# Patient Record
Sex: MALE | Race: WHITE | Employment: FULL TIME | URBAN - METROPOLITAN AREA
[De-identification: names, ages, dates, MRNs, and addresses within clinical notes are randomized per-mention and may not be internally consistent; named-entity substitution may affect disease eponyms.]

---

## 2018-01-20 PROBLEM — E11.8 TYPE 2 DIABETES MELLITUS WITH COMPLICATION, WITH LONG-TERM CURRENT USE OF INSULIN (HCC): Status: ACTIVE | Noted: 2018-01-20

## 2018-01-20 PROBLEM — I10 ESSENTIAL HYPERTENSION: Status: ACTIVE | Noted: 2018-01-20

## 2018-01-20 PROBLEM — E78.5 HYPERLIPEMIA: Status: ACTIVE | Noted: 2018-01-20

## 2018-01-20 PROBLEM — Z79.4 TYPE 2 DIABETES MELLITUS WITH COMPLICATION, WITH LONG-TERM CURRENT USE OF INSULIN (HCC): Status: ACTIVE | Noted: 2018-01-20

## 2018-01-20 PROBLEM — R07.9 CHEST PAIN: Status: ACTIVE | Noted: 2018-01-20

## 2018-01-21 PROBLEM — R07.9 CHEST PAIN: Status: RESOLVED | Noted: 2018-01-20 | Resolved: 2018-01-21

## 2018-01-21 PROBLEM — I44.7 LBBB (LEFT BUNDLE BRANCH BLOCK): Status: ACTIVE | Noted: 2018-01-21

## 2018-01-21 PROBLEM — I42.9 CARDIOMYOPATHY (HCC): Status: ACTIVE | Noted: 2018-01-21

## 2023-04-24 RX ORDER — DESVENLAFAXINE SUCCINATE 50 MG/1
50 TABLET, EXTENDED RELEASE ORAL DAILY
COMMUNITY

## 2023-04-24 RX ORDER — CLONAZEPAM 1 MG/1
TABLET ORAL
COMMUNITY
Start: 2017-12-15

## 2023-04-24 RX ORDER — ROSUVASTATIN CALCIUM 10 MG/1
TABLET, COATED ORAL
COMMUNITY
Start: 2017-10-09

## 2023-04-24 RX ORDER — LORAZEPAM 1 MG/1
1 TABLET ORAL EVERY 6 HOURS PRN
COMMUNITY

## 2023-04-24 RX ORDER — TRAZODONE HYDROCHLORIDE 50 MG/1
100 TABLET ORAL
COMMUNITY
Start: 2017-09-13

## 2023-04-24 RX ORDER — PIOGLITAZONEHYDROCHLORIDE 45 MG/1
45 TABLET ORAL
COMMUNITY
Start: 2018-01-10

## 2023-04-27 ENCOUNTER — ANESTHESIA EVENT (OUTPATIENT)
Dept: SURGERY | Age: 68
End: 2023-04-27
Payer: MEDICARE

## 2023-04-30 ENCOUNTER — PREP FOR PROCEDURE (OUTPATIENT)
Dept: OPHTHALMOLOGY | Age: 68
End: 2023-04-30

## 2023-04-30 RX ORDER — KETOROLAC TROMETHAMINE 5 MG/ML
1 SOLUTION OPHTHALMIC SEE ADMIN INSTRUCTIONS
Status: CANCELLED | OUTPATIENT
Start: 2023-04-30

## 2023-04-30 RX ORDER — SODIUM CHLORIDE 0.9 % (FLUSH) 0.9 %
5-40 SYRINGE (ML) INJECTION EVERY 12 HOURS SCHEDULED
Status: CANCELLED | OUTPATIENT
Start: 2023-04-30

## 2023-04-30 RX ORDER — SODIUM CHLORIDE 0.9 % (FLUSH) 0.9 %
5-40 SYRINGE (ML) INJECTION PRN
Status: CANCELLED | OUTPATIENT
Start: 2023-04-30

## 2023-04-30 RX ORDER — CYCLOPENTOLATE HYDROCHLORIDE 10 MG/ML
1 SOLUTION/ DROPS OPHTHALMIC PRN
Status: CANCELLED | OUTPATIENT
Start: 2023-04-30

## 2023-04-30 RX ORDER — TETRACAINE HYDROCHLORIDE 5 MG/ML
1 SOLUTION OPHTHALMIC SEE ADMIN INSTRUCTIONS
Status: CANCELLED | OUTPATIENT
Start: 2023-04-30

## 2023-04-30 RX ORDER — CIPROFLOXACIN HYDROCHLORIDE 3.5 MG/ML
1 SOLUTION/ DROPS TOPICAL SEE ADMIN INSTRUCTIONS
Status: CANCELLED | OUTPATIENT
Start: 2023-04-30

## 2023-04-30 RX ORDER — PHENYLEPHRINE HCL 2.5 %
1 DROPS OPHTHALMIC (EYE) SEE ADMIN INSTRUCTIONS
Status: CANCELLED | OUTPATIENT
Start: 2023-04-30

## 2023-04-30 RX ORDER — TROPICAMIDE 10 MG/ML
1 SOLUTION/ DROPS OPHTHALMIC SEE ADMIN INSTRUCTIONS
Status: CANCELLED | OUTPATIENT
Start: 2023-04-30

## 2023-04-30 RX ORDER — SODIUM CHLORIDE 9 MG/ML
INJECTION, SOLUTION INTRAVENOUS PRN
Status: CANCELLED | OUTPATIENT
Start: 2023-04-30

## 2023-05-01 ENCOUNTER — ANESTHESIA (OUTPATIENT)
Dept: SURGERY | Age: 68
End: 2023-05-01
Payer: MEDICARE

## 2023-05-01 ENCOUNTER — HOSPITAL ENCOUNTER (OUTPATIENT)
Age: 68
Setting detail: OUTPATIENT SURGERY
Discharge: HOME | End: 2023-05-01
Attending: STUDENT IN AN ORGANIZED HEALTH CARE EDUCATION/TRAINING PROGRAM | Admitting: STUDENT IN AN ORGANIZED HEALTH CARE EDUCATION/TRAINING PROGRAM
Payer: MEDICARE

## 2023-05-01 VITALS
WEIGHT: 250 LBS | HEART RATE: 87 BPM | HEIGHT: 73 IN | SYSTOLIC BLOOD PRESSURE: 162 MMHG | OXYGEN SATURATION: 94 % | DIASTOLIC BLOOD PRESSURE: 83 MMHG | RESPIRATION RATE: 18 BRPM | TEMPERATURE: 97.7 F | BODY MASS INDEX: 33.13 KG/M2

## 2023-05-01 LAB
GLUCOSE BLD-MCNC: 296 MG/DL (ref 70–99)
GLUCOSE BLD-MCNC: 307 MG/DL (ref 70–99)
PERFORMED ON: ABNORMAL
PERFORMED ON: ABNORMAL

## 2023-05-01 PROCEDURE — 6360000002 HC RX W HCPCS: Performed by: NURSE ANESTHETIST, CERTIFIED REGISTERED

## 2023-05-01 PROCEDURE — 2709999900 HC NON-CHARGEABLE SUPPLY: Performed by: STUDENT IN AN ORGANIZED HEALTH CARE EDUCATION/TRAINING PROGRAM

## 2023-05-01 PROCEDURE — 3600000004 HC SURGERY LEVEL 4 BASE: Performed by: STUDENT IN AN ORGANIZED HEALTH CARE EDUCATION/TRAINING PROGRAM

## 2023-05-01 PROCEDURE — 2500000003 HC RX 250 WO HCPCS: Performed by: STUDENT IN AN ORGANIZED HEALTH CARE EDUCATION/TRAINING PROGRAM

## 2023-05-01 PROCEDURE — 3600000014 HC SURGERY LEVEL 4 ADDTL 15MIN: Performed by: STUDENT IN AN ORGANIZED HEALTH CARE EDUCATION/TRAINING PROGRAM

## 2023-05-01 PROCEDURE — 7100000010 HC PHASE II RECOVERY - FIRST 15 MIN: Performed by: STUDENT IN AN ORGANIZED HEALTH CARE EDUCATION/TRAINING PROGRAM

## 2023-05-01 PROCEDURE — 6370000000 HC RX 637 (ALT 250 FOR IP): Performed by: STUDENT IN AN ORGANIZED HEALTH CARE EDUCATION/TRAINING PROGRAM

## 2023-05-01 PROCEDURE — 3700000001 HC ADD 15 MINUTES (ANESTHESIA): Performed by: STUDENT IN AN ORGANIZED HEALTH CARE EDUCATION/TRAINING PROGRAM

## 2023-05-01 PROCEDURE — 2580000003 HC RX 258: Performed by: NURSE ANESTHETIST, CERTIFIED REGISTERED

## 2023-05-01 PROCEDURE — 2580000003 HC RX 258: Performed by: ANESTHESIOLOGY

## 2023-05-01 PROCEDURE — 3700000000 HC ANESTHESIA ATTENDED CARE: Performed by: STUDENT IN AN ORGANIZED HEALTH CARE EDUCATION/TRAINING PROGRAM

## 2023-05-01 PROCEDURE — V2788 PRESBYOPIA-CORRECT FUNCTION: HCPCS | Performed by: STUDENT IN AN ORGANIZED HEALTH CARE EDUCATION/TRAINING PROGRAM

## 2023-05-01 DEVICE — IMPLANTABLE DEVICE: Type: IMPLANTABLE DEVICE | Site: EYE | Status: FUNCTIONAL

## 2023-05-01 RX ORDER — SODIUM CHLORIDE 0.9 % (FLUSH) 0.9 %
5-40 SYRINGE (ML) INJECTION PRN
Status: DISCONTINUED | OUTPATIENT
Start: 2023-05-01 | End: 2023-05-01 | Stop reason: HOSPADM

## 2023-05-01 RX ORDER — KETOROLAC TROMETHAMINE 5 MG/ML
1 SOLUTION OPHTHALMIC SEE ADMIN INSTRUCTIONS
Status: DISCONTINUED | OUTPATIENT
Start: 2023-05-01 | End: 2023-05-01 | Stop reason: HOSPADM

## 2023-05-01 RX ORDER — BALANCED SALT SOLUTION 6.4; .75; .48; .3; 3.9; 1.7 MG/ML; MG/ML; MG/ML; MG/ML; MG/ML; MG/ML
SOLUTION OPHTHALMIC
Status: COMPLETED | OUTPATIENT
Start: 2023-05-01 | End: 2023-05-01

## 2023-05-01 RX ORDER — TETRACAINE HYDROCHLORIDE 5 MG/ML
1 SOLUTION OPHTHALMIC SEE ADMIN INSTRUCTIONS
Status: DISCONTINUED | OUTPATIENT
Start: 2023-05-01 | End: 2023-05-01 | Stop reason: HOSPADM

## 2023-05-01 RX ORDER — CIPROFLOXACIN HYDROCHLORIDE 3.5 MG/ML
1 SOLUTION/ DROPS TOPICAL SEE ADMIN INSTRUCTIONS
Status: COMPLETED | OUTPATIENT
Start: 2023-05-01 | End: 2023-05-01

## 2023-05-01 RX ORDER — PHENYLEPHRINE HCL 2.5 %
1 DROPS OPHTHALMIC (EYE) SEE ADMIN INSTRUCTIONS
Status: DISCONTINUED | OUTPATIENT
Start: 2023-05-01 | End: 2023-05-01 | Stop reason: HOSPADM

## 2023-05-01 RX ORDER — SODIUM CHLORIDE 0.9 % (FLUSH) 0.9 %
5-40 SYRINGE (ML) INJECTION EVERY 12 HOURS SCHEDULED
Status: DISCONTINUED | OUTPATIENT
Start: 2023-05-01 | End: 2023-05-01 | Stop reason: HOSPADM

## 2023-05-01 RX ORDER — TETRACAINE HYDROCHLORIDE 5 MG/ML
SOLUTION OPHTHALMIC
Status: COMPLETED | OUTPATIENT
Start: 2023-05-01 | End: 2023-05-01

## 2023-05-01 RX ORDER — DEXTROSE MONOHYDRATE 100 MG/ML
INJECTION, SOLUTION INTRAVENOUS CONTINUOUS PRN
Status: DISCONTINUED | OUTPATIENT
Start: 2023-05-01 | End: 2023-05-01 | Stop reason: HOSPADM

## 2023-05-01 RX ORDER — SODIUM CHLORIDE 9 MG/ML
INJECTION, SOLUTION INTRAVENOUS PRN
Status: DISCONTINUED | OUTPATIENT
Start: 2023-05-01 | End: 2023-05-01 | Stop reason: HOSPADM

## 2023-05-01 RX ORDER — SODIUM CHLORIDE 9 MG/ML
INJECTION, SOLUTION INTRAVENOUS CONTINUOUS PRN
Status: DISCONTINUED | OUTPATIENT
Start: 2023-05-01 | End: 2023-05-01 | Stop reason: SDUPTHER

## 2023-05-01 RX ORDER — FENTANYL CITRATE 50 UG/ML
INJECTION, SOLUTION INTRAMUSCULAR; INTRAVENOUS PRN
Status: DISCONTINUED | OUTPATIENT
Start: 2023-05-01 | End: 2023-05-01 | Stop reason: SDUPTHER

## 2023-05-01 RX ORDER — OFLOXACIN 3 MG/ML
SOLUTION/ DROPS OPHTHALMIC
Status: COMPLETED | OUTPATIENT
Start: 2023-05-01 | End: 2023-05-01

## 2023-05-01 RX ORDER — INSULIN LISPRO 100 [IU]/ML
7 INJECTION, SOLUTION INTRAVENOUS; SUBCUTANEOUS
Status: DISCONTINUED | OUTPATIENT
Start: 2023-05-01 | End: 2023-05-01

## 2023-05-01 RX ORDER — PANTOPRAZOLE SODIUM 40 MG/1
40 TABLET, DELAYED RELEASE ORAL DAILY
COMMUNITY

## 2023-05-01 RX ORDER — BRIMONIDINE TARTRATE 2 MG/ML
SOLUTION/ DROPS OPHTHALMIC
Status: COMPLETED | OUTPATIENT
Start: 2023-05-01 | End: 2023-05-01

## 2023-05-01 RX ORDER — MIDAZOLAM HYDROCHLORIDE 1 MG/ML
INJECTION INTRAMUSCULAR; INTRAVENOUS PRN
Status: DISCONTINUED | OUTPATIENT
Start: 2023-05-01 | End: 2023-05-01 | Stop reason: SDUPTHER

## 2023-05-01 RX ORDER — TROPICAMIDE 10 MG/ML
1 SOLUTION/ DROPS OPHTHALMIC SEE ADMIN INSTRUCTIONS
Status: DISCONTINUED | OUTPATIENT
Start: 2023-05-01 | End: 2023-05-01 | Stop reason: HOSPADM

## 2023-05-01 RX ORDER — CYCLOPENTOLATE HYDROCHLORIDE 10 MG/ML
1 SOLUTION/ DROPS OPHTHALMIC PRN
Status: DISCONTINUED | OUTPATIENT
Start: 2023-05-01 | End: 2023-05-01 | Stop reason: HOSPADM

## 2023-05-01 RX ADMIN — INSULIN HUMAN 7 UNITS: 100 INJECTION, SOLUTION PARENTERAL at 11:00

## 2023-05-01 RX ADMIN — FENTANYL CITRATE 50 MCG: 50 INJECTION INTRAMUSCULAR; INTRAVENOUS at 11:18

## 2023-05-01 RX ADMIN — PHENYLEPHRINE HYDROCHLORIDE 1 DROP: 25 SOLUTION/ DROPS OPHTHALMIC at 10:20

## 2023-05-01 RX ADMIN — TROPICAMIDE 1 DROP: 10 SOLUTION/ DROPS OPHTHALMIC at 10:25

## 2023-05-01 RX ADMIN — TETRACAINE HYDROCHLORIDE 1 DROP: 5 SOLUTION OPHTHALMIC at 10:20

## 2023-05-01 RX ADMIN — KETOROLAC TROMETHAMINE 1 DROP: 0.5 SOLUTION OPHTHALMIC at 10:20

## 2023-05-01 RX ADMIN — FENTANYL CITRATE 50 MCG: 50 INJECTION INTRAMUSCULAR; INTRAVENOUS at 11:04

## 2023-05-01 RX ADMIN — CIPROFLOXACIN 1 DROP: 3 SOLUTION OPHTHALMIC at 10:20

## 2023-05-01 RX ADMIN — CYCLOPENTOLATE HYDROCHLORIDE 1 DROP: 10 SOLUTION/ DROPS OPHTHALMIC at 10:28

## 2023-05-01 RX ADMIN — PHENYLEPHRINE HYDROCHLORIDE 1 DROP: 25 SOLUTION/ DROPS OPHTHALMIC at 10:28

## 2023-05-01 RX ADMIN — TROPICAMIDE 1 DROP: 10 SOLUTION/ DROPS OPHTHALMIC at 10:20

## 2023-05-01 RX ADMIN — PHENYLEPHRINE HYDROCHLORIDE 1 DROP: 25 SOLUTION/ DROPS OPHTHALMIC at 10:25

## 2023-05-01 RX ADMIN — SODIUM CHLORIDE: 9 INJECTION, SOLUTION INTRAVENOUS at 10:29

## 2023-05-01 RX ADMIN — CYCLOPENTOLATE HYDROCHLORIDE 1 DROP: 10 SOLUTION/ DROPS OPHTHALMIC at 10:20

## 2023-05-01 RX ADMIN — MIDAZOLAM 1 MG: 1 INJECTION INTRAMUSCULAR; INTRAVENOUS at 11:19

## 2023-05-01 RX ADMIN — TROPICAMIDE 1 DROP: 10 SOLUTION/ DROPS OPHTHALMIC at 10:28

## 2023-05-01 RX ADMIN — CYCLOPENTOLATE HYDROCHLORIDE 1 DROP: 10 SOLUTION/ DROPS OPHTHALMIC at 10:25

## 2023-05-01 RX ADMIN — MIDAZOLAM 1 MG: 1 INJECTION INTRAMUSCULAR; INTRAVENOUS at 11:04

## 2023-05-01 RX ADMIN — SODIUM CHLORIDE: 9 INJECTION, SOLUTION INTRAVENOUS at 11:04

## 2023-05-01 ASSESSMENT — LIFESTYLE VARIABLES: SMOKING_STATUS: 0

## 2023-05-01 ASSESSMENT — PAIN SCALES - GENERAL
PAINLEVEL_OUTOF10: 0

## 2023-05-01 NOTE — ANESTHESIA POSTPROCEDURE EVALUATION
Department of Anesthesiology  Postprocedure Note    Patient: Meg Morris  MRN: [id number]  YOB: 1955  Date of evaluation: 5/1/2023      Procedure Summary     Date: 05/01/23 Room / Location: [address]    Anesthesia Start: 1104 Anesthesia Stop: 1129    Procedure: PHACOEMULSIFICATION WITH CLAREON PANOPTIX TORIC INTRAOCULAR LENS IMPLANT - RIGHT EYE (Right: Eye) Diagnosis:       Nuclear sclerotic cataract of right eye      Presbyopia      Astigmatism of right eye, unspecified type      (Nuclear sclerotic cataract of right eye, Presbyopia, Astigmatism of right eye)    Surgeons: James Mack MD Responsible Provider: Antoni Ibarra MD    Anesthesia Type: MAC ASA Status: 3          Anesthesia Type: MAC    Stephen Phase I: Stephen Score: 10    Stephen Phase II: Stephen Score: 10      Anesthesia Post Evaluation    Patient location during evaluation: PACU  Patient participation: complete - patient participated  Level of consciousness: awake  Airway patency: patent  Nausea & Vomiting: no nausea and no vomiting  Complications: no  Cardiovascular status: hemodynamically stable and blood pressure returned to baseline  Respiratory status: spontaneous ventilation, nonlabored ventilation and room air  Hydration status: stable  Comments: Mr. Alphonso Morales was seen resting comfortably following his procedure. Appropriate for discharge home with . Improved compliance with diabetes regimen strongly encouraged. No acute concerns.

## 2023-05-01 NOTE — ANESTHESIA PRE PROCEDURE
Department of Anesthesiology  Preprocedure Note       Name:  Juliano Bullard   Age:  69 y.o.  :  1955                                          MRN:  [id number]         Date:  2023      Surgeon: Ari Herrera):  Haylee Mckinney MD    Procedure: Procedure(s):  PHACOEMULSIFICATION WITH CLAREON PANOPTIX TORIC INTRAOCULAR LENS IMPLANT - RIGHT EYE    Medications prior to admission:   Prior to Admission medications    Medication Sig Start Date End Date Taking? Authorizing Provider   pantoprazole (PROTONIX) 40 MG tablet Take 1 tablet by mouth daily   Yes Historical Provider, MD   insulin lispro (HUMALOG) 100 UNIT/ML injection vial Inject 7 Units into the skin once   Yes Historical Provider, MD   pioglitazone (ACTOS) 45 MG tablet Take 1 tablet by mouth 1/10/18  Yes Historical Provider, MD   rosuvastatin (CRESTOR) 10 MG tablet TAKE ONE TABLET BY MOUTH DAILY 10/9/17  Yes Historical Provider, MD   traZODone (DESYREL) 50 MG tablet Take 3 tablets by mouth 17  Yes Historical Provider, MD   clonazePAM (KLONOPIN) 1 MG tablet TAKE ONE TABLET BY MOUTH ONCE DAILY AS NEEDED 12/15/17  Yes Historical Provider, MD   amLODIPine Besylate (NORVASC PO) Take by mouth   Yes Historical Provider, MD   desvenlafaxine succinate (PRISTIQ) 50 MG TB24 extended release tablet Take 1 tablet by mouth daily   Yes Historical Provider, MD   Propranolol HCl (INDERAL PO) Take by mouth   Yes Historical Provider, MD   LORazepam (ATIVAN) 1 MG tablet Take 1 tablet by mouth every 6 hours as needed for Anxiety.  Max Daily Amount: 4 mg   Yes Historical Provider, MD   atorvastatin (LIPITOR) 40 MG tablet Take 1 tablet by mouth nightly 18   CLARENCE Peter - NP   lisinopril (PRINIVIL;ZESTRIL) 20 MG tablet Take 1 tablet by mouth daily  Patient taking differently: Take 2 tablets by mouth daily 18   CLARENCE Peter NP   carvedilol (COREG) 25 MG tablet Take 1 tablet by mouth 2 times daily (with meals) 18   CLARENCE Peter NP   metFORMIN

## 2023-05-01 NOTE — DISCHARGE INSTRUCTIONS
Luiz Romero MD, NAGI    POST OPERATIVE INSTRUCTIONS FOR CATARACT SURGERY    Left / Right   Eye       Starting the DAY of surgery use all drops as directed. Please locate the name of the individual medication you were prescribed below and follow the instructions for its use. ANTIBIOTIC: (SOSA Cap): Your drop will be either Vigamox (moxifloxacin), Besivance or Polytrim. Instill 4 drops a day for 1 week. NSAID (GRAY Cap): Your drop will be BROMSITE, PROLENSA or KETOROLAC. Instill 1 drop daily for 4 weeks. Patients who receive KETOROLAC: Instill 1 drop 2 times a day for 2 weeks. STEROID (PINK Cap): Your steroid drop will be PREDNISOLONE ACETATE, INVELTYS OR LOTEMAX SM. Instill 4 drops a day for 1 week,   3 drops a day for 1 week,   2 drops a day for 1 week,  1 drop a day for 1 week. Please bring ALL of your eye drops with you to surgery and all follow-up appointments. Wait at least 3-5 minutes between eye drops. It's normal for some drops to briefly sting. POST OPERATIVE CATARACT DROP SCHEDULE    Week 1 Day 1 Day 2 Day 3 Day 4 Day 5 Day 6 Day 7   Marvelyn Megan or POLYTRIM  (Tan) ? ?  ? ? ? ?  ? ? ? ?  ? ? ? ?  ? ? ? ?  ? ? ? ?  ? ?   ? ?  ? ?     [place] Bae or KETOROLAC  Cheli Rumpf) ? ? ? ? ? ? ? Fowler Cydney or LOTEMAX SM  (Pink) ? ?  ? ? ? ?  ? ? ? ?  ? ? ? ?  ? ? ? ?  ? ? ? ?  ? ? ? ?  ? ? Week 2 Day 8 Day 9 Day 10 Day 11 Day 12 Day 13 Day 15   BROMSITE, PROLENSA or  KETOROLAC  Cheli Rumpf) ? ? ? ? ? ? ? Fowler Cydney or LOTEMAX SM   (Pink) ? ? ? ? ? ? ? ? ? ? ? ? ? ? ? ? ? ? ? ? ? Week 3 Day 15 Day 16 Day 17 Day 18 Day 19 Day 20 Day 21   [place] Bae or  Cheli Rumpf) ? ? ? ? ? ? ? Fowler Cydney or LOTEMAX SM   (Pink) ? ? ? ? ? ? ? ? ? ? ? ? ? ? Week 4 Day 22 Day 23 Day 24 Day 25 Day 26 Day 2 Day 29   Radha Bae or  Cheli Hernández) ? ? ? ? ? ? ?    Reji Lamas or LEXY EVANS

## 2023-05-01 NOTE — OP NOTE
Thor Boyle    OPERATIVE NOTE    Preoperative Diagnosis: Visually significant cataract right eye, Regular Astigmatism, right eye    Postoperative Diagnosis: Visually significant cataract right eye, Regular Astigmatism, right eye    Procedure: Phacoemulsification with intraocular lens implantation, right eye    Surgeon: Shanelle Love. John Romero MD, NAGI    Anesthesia: MAC, topical.    Complications: none    Estimated blood loss: less than 1 ml. Specimens: none    Indications for procedure: The patient is a 69y.o. year old who has experienced painless, progressive deterioration in vision which corresponds with increasing lenticular opacification of the right eye. This is contributing to an overall decline in visual functioning and interfering with activities of daily living as described in the medical record. After discussion of relevant indications, risks, benefits, alternatives, and limitations, the patient consented to proceed with cataract phacoemulsification with lens implantation for visual rehabilitation. Details of the procedure: Following informed consent, the patient was taken to the operating room and placed in the supine position. Monitored anesthesia care was administered. The patient's name, eye, intraocular lens model and power were verified (time-out). The patient was positioned under the operative microscope and the operative eye was prepped and draped in the usual sterile fashion using aseptic technique for cataract surgery. A wire lid speculum was placed. A peripheral paracentesis was made using a 1.1 mm blade. 2% preservative free lidocaine with dilute 1:1000 preservative free epinephrine (Epi-Shugarcaine) was injected through the side port incision for topical anesthesia. The anterior chamber was deepened with viscoelastic. A tri-planar temporal clear cornea incision was made with a 2.4 mm keratome.  A continuous curvilinear capsulorrhexis was initiated with a cystotome and completed using

## 2023-05-02 RX ORDER — DILTIAZEM HYDROCHLORIDE 120 MG/1
120 CAPSULE, EXTENDED RELEASE ORAL DAILY
COMMUNITY
Start: 2023-02-15

## 2023-05-12 ENCOUNTER — ANESTHESIA EVENT (OUTPATIENT)
Dept: SURGERY | Age: 68
End: 2023-05-12
Payer: MEDICARE

## 2023-05-14 RX ORDER — TROPICAMIDE 10 MG/ML
1 SOLUTION/ DROPS OPHTHALMIC SEE ADMIN INSTRUCTIONS
Status: CANCELLED | OUTPATIENT
Start: 2023-05-14

## 2023-05-14 RX ORDER — SODIUM CHLORIDE 9 MG/ML
INJECTION, SOLUTION INTRAVENOUS PRN
Status: CANCELLED | OUTPATIENT
Start: 2023-05-14

## 2023-05-14 RX ORDER — CYCLOPENTOLATE HYDROCHLORIDE 10 MG/ML
1 SOLUTION/ DROPS OPHTHALMIC PRN
Status: CANCELLED | OUTPATIENT
Start: 2023-05-14

## 2023-05-14 RX ORDER — TETRACAINE HYDROCHLORIDE 5 MG/ML
1 SOLUTION OPHTHALMIC SEE ADMIN INSTRUCTIONS
Status: CANCELLED | OUTPATIENT
Start: 2023-05-14

## 2023-05-14 RX ORDER — SODIUM CHLORIDE 0.9 % (FLUSH) 0.9 %
5-40 SYRINGE (ML) INJECTION EVERY 12 HOURS SCHEDULED
Status: CANCELLED | OUTPATIENT
Start: 2023-05-14

## 2023-05-14 RX ORDER — KETOROLAC TROMETHAMINE 5 MG/ML
1 SOLUTION OPHTHALMIC SEE ADMIN INSTRUCTIONS
Status: CANCELLED | OUTPATIENT
Start: 2023-05-14

## 2023-05-14 RX ORDER — SODIUM CHLORIDE 0.9 % (FLUSH) 0.9 %
5-40 SYRINGE (ML) INJECTION PRN
Status: CANCELLED | OUTPATIENT
Start: 2023-05-14

## 2023-05-14 RX ORDER — CIPROFLOXACIN HYDROCHLORIDE 3.5 MG/ML
1 SOLUTION/ DROPS TOPICAL SEE ADMIN INSTRUCTIONS
Status: CANCELLED | OUTPATIENT
Start: 2023-05-14

## 2023-05-14 RX ORDER — PHENYLEPHRINE HCL 2.5 %
1 DROPS OPHTHALMIC (EYE) SEE ADMIN INSTRUCTIONS
Status: CANCELLED | OUTPATIENT
Start: 2023-05-14

## 2023-05-15 ENCOUNTER — ANESTHESIA (OUTPATIENT)
Dept: SURGERY | Age: 68
End: 2023-05-15
Payer: MEDICARE

## 2023-05-15 ENCOUNTER — HOSPITAL ENCOUNTER (OUTPATIENT)
Age: 68
Setting detail: OUTPATIENT SURGERY
Discharge: HOME | End: 2023-05-15
Attending: STUDENT IN AN ORGANIZED HEALTH CARE EDUCATION/TRAINING PROGRAM | Admitting: STUDENT IN AN ORGANIZED HEALTH CARE EDUCATION/TRAINING PROGRAM
Payer: MEDICARE

## 2023-05-15 VITALS
WEIGHT: 250 LBS | SYSTOLIC BLOOD PRESSURE: 153 MMHG | BODY MASS INDEX: 33.13 KG/M2 | HEART RATE: 88 BPM | RESPIRATION RATE: 18 BRPM | OXYGEN SATURATION: 95 % | DIASTOLIC BLOOD PRESSURE: 80 MMHG | TEMPERATURE: 97.7 F | HEIGHT: 73 IN

## 2023-05-15 LAB
GLUCOSE BLD-MCNC: 212 MG/DL (ref 70–99)
GLUCOSE BLD-MCNC: 228 MG/DL (ref 70–99)
PERFORMED ON: ABNORMAL
PERFORMED ON: ABNORMAL

## 2023-05-15 PROCEDURE — 6360000002 HC RX W HCPCS: Performed by: NURSE ANESTHETIST, CERTIFIED REGISTERED

## 2023-05-15 PROCEDURE — 2500000003 HC RX 250 WO HCPCS: Performed by: STUDENT IN AN ORGANIZED HEALTH CARE EDUCATION/TRAINING PROGRAM

## 2023-05-15 PROCEDURE — 3600000004 HC SURGERY LEVEL 4 BASE: Performed by: STUDENT IN AN ORGANIZED HEALTH CARE EDUCATION/TRAINING PROGRAM

## 2023-05-15 PROCEDURE — 3700000000 HC ANESTHESIA ATTENDED CARE: Performed by: STUDENT IN AN ORGANIZED HEALTH CARE EDUCATION/TRAINING PROGRAM

## 2023-05-15 PROCEDURE — 2709999900 HC NON-CHARGEABLE SUPPLY: Performed by: STUDENT IN AN ORGANIZED HEALTH CARE EDUCATION/TRAINING PROGRAM

## 2023-05-15 PROCEDURE — V2788 PRESBYOPIA-CORRECT FUNCTION: HCPCS | Performed by: STUDENT IN AN ORGANIZED HEALTH CARE EDUCATION/TRAINING PROGRAM

## 2023-05-15 PROCEDURE — 2580000003 HC RX 258: Performed by: NURSE ANESTHETIST, CERTIFIED REGISTERED

## 2023-05-15 PROCEDURE — 3700000001 HC ADD 15 MINUTES (ANESTHESIA): Performed by: STUDENT IN AN ORGANIZED HEALTH CARE EDUCATION/TRAINING PROGRAM

## 2023-05-15 PROCEDURE — 2580000003 HC RX 258: Performed by: ANESTHESIOLOGY

## 2023-05-15 PROCEDURE — 6370000000 HC RX 637 (ALT 250 FOR IP): Performed by: STUDENT IN AN ORGANIZED HEALTH CARE EDUCATION/TRAINING PROGRAM

## 2023-05-15 PROCEDURE — 3600000014 HC SURGERY LEVEL 4 ADDTL 15MIN: Performed by: STUDENT IN AN ORGANIZED HEALTH CARE EDUCATION/TRAINING PROGRAM

## 2023-05-15 PROCEDURE — 7100000010 HC PHASE II RECOVERY - FIRST 15 MIN: Performed by: STUDENT IN AN ORGANIZED HEALTH CARE EDUCATION/TRAINING PROGRAM

## 2023-05-15 DEVICE — IMPLANTABLE DEVICE: Type: IMPLANTABLE DEVICE | Site: EYE | Status: FUNCTIONAL

## 2023-05-15 RX ORDER — SODIUM CHLORIDE 9 MG/ML
INJECTION, SOLUTION INTRAVENOUS PRN
Status: DISCONTINUED | OUTPATIENT
Start: 2023-05-15 | End: 2023-05-15 | Stop reason: SDUPTHER

## 2023-05-15 RX ORDER — CYCLOPENTOLATE HYDROCHLORIDE 10 MG/ML
1 SOLUTION/ DROPS OPHTHALMIC PRN
Status: DISCONTINUED | OUTPATIENT
Start: 2023-05-15 | End: 2023-05-15 | Stop reason: HOSPADM

## 2023-05-15 RX ORDER — BALANCED SALT SOLUTION 6.4; .75; .48; .3; 3.9; 1.7 MG/ML; MG/ML; MG/ML; MG/ML; MG/ML; MG/ML
SOLUTION OPHTHALMIC
Status: COMPLETED | OUTPATIENT
Start: 2023-05-15 | End: 2023-05-15

## 2023-05-15 RX ORDER — SODIUM CHLORIDE 9 MG/ML
INJECTION, SOLUTION INTRAVENOUS PRN
Status: DISCONTINUED | OUTPATIENT
Start: 2023-05-15 | End: 2023-05-15 | Stop reason: HOSPADM

## 2023-05-15 RX ORDER — SODIUM CHLORIDE 0.9 % (FLUSH) 0.9 %
5-40 SYRINGE (ML) INJECTION PRN
Status: DISCONTINUED | OUTPATIENT
Start: 2023-05-15 | End: 2023-05-15 | Stop reason: HOSPADM

## 2023-05-15 RX ORDER — TETRACAINE HYDROCHLORIDE 5 MG/ML
SOLUTION OPHTHALMIC
Status: COMPLETED | OUTPATIENT
Start: 2023-05-15 | End: 2023-05-15

## 2023-05-15 RX ORDER — BRIMONIDINE TARTRATE 2 MG/ML
SOLUTION/ DROPS OPHTHALMIC
Status: COMPLETED | OUTPATIENT
Start: 2023-05-15 | End: 2023-05-15

## 2023-05-15 RX ORDER — PREDNISOLONE ACETATE 10 MG/ML
SUSPENSION/ DROPS OPHTHALMIC
Status: COMPLETED | OUTPATIENT
Start: 2023-05-15 | End: 2023-05-15

## 2023-05-15 RX ORDER — FENTANYL CITRATE 50 UG/ML
INJECTION, SOLUTION INTRAMUSCULAR; INTRAVENOUS PRN
Status: DISCONTINUED | OUTPATIENT
Start: 2023-05-15 | End: 2023-05-15 | Stop reason: SDUPTHER

## 2023-05-15 RX ORDER — SODIUM CHLORIDE 0.9 % (FLUSH) 0.9 %
5-40 SYRINGE (ML) INJECTION EVERY 12 HOURS SCHEDULED
Status: DISCONTINUED | OUTPATIENT
Start: 2023-05-15 | End: 2023-05-15 | Stop reason: SDUPTHER

## 2023-05-15 RX ORDER — SODIUM CHLORIDE 0.9 % (FLUSH) 0.9 %
5-40 SYRINGE (ML) INJECTION PRN
Status: DISCONTINUED | OUTPATIENT
Start: 2023-05-15 | End: 2023-05-15 | Stop reason: SDUPTHER

## 2023-05-15 RX ORDER — SODIUM CHLORIDE 0.9 % (FLUSH) 0.9 %
5-40 SYRINGE (ML) INJECTION EVERY 12 HOURS SCHEDULED
Status: DISCONTINUED | OUTPATIENT
Start: 2023-05-15 | End: 2023-05-15 | Stop reason: HOSPADM

## 2023-05-15 RX ORDER — TETRACAINE HYDROCHLORIDE 5 MG/ML
1 SOLUTION OPHTHALMIC SEE ADMIN INSTRUCTIONS
Status: DISCONTINUED | OUTPATIENT
Start: 2023-05-15 | End: 2023-05-15 | Stop reason: HOSPADM

## 2023-05-15 RX ORDER — PHENYLEPHRINE HYDROCHLORIDE 100 MG/ML
1 SOLUTION/ DROPS OPHTHALMIC SEE ADMIN INSTRUCTIONS
Status: COMPLETED | OUTPATIENT
Start: 2023-05-15 | End: 2023-05-15

## 2023-05-15 RX ORDER — SODIUM CHLORIDE 9 MG/ML
INJECTION, SOLUTION INTRAVENOUS CONTINUOUS PRN
Status: DISCONTINUED | OUTPATIENT
Start: 2023-05-15 | End: 2023-05-15 | Stop reason: SDUPTHER

## 2023-05-15 RX ORDER — PHENYLEPHRINE HCL 2.5 %
1 DROPS OPHTHALMIC (EYE) SEE ADMIN INSTRUCTIONS
Status: DISCONTINUED | OUTPATIENT
Start: 2023-05-15 | End: 2023-05-15 | Stop reason: RX

## 2023-05-15 RX ORDER — KETOROLAC TROMETHAMINE 5 MG/ML
1 SOLUTION OPHTHALMIC SEE ADMIN INSTRUCTIONS
Status: DISCONTINUED | OUTPATIENT
Start: 2023-05-15 | End: 2023-05-15 | Stop reason: HOSPADM

## 2023-05-15 RX ORDER — MIDAZOLAM HYDROCHLORIDE 1 MG/ML
INJECTION INTRAMUSCULAR; INTRAVENOUS PRN
Status: DISCONTINUED | OUTPATIENT
Start: 2023-05-15 | End: 2023-05-15 | Stop reason: SDUPTHER

## 2023-05-15 RX ORDER — CIPROFLOXACIN HYDROCHLORIDE 3.5 MG/ML
1 SOLUTION/ DROPS TOPICAL SEE ADMIN INSTRUCTIONS
Status: COMPLETED | OUTPATIENT
Start: 2023-05-15 | End: 2023-05-15

## 2023-05-15 RX ORDER — OFLOXACIN 3 MG/ML
SOLUTION/ DROPS OPHTHALMIC
Status: COMPLETED | OUTPATIENT
Start: 2023-05-15 | End: 2023-05-15

## 2023-05-15 RX ORDER — TROPICAMIDE 10 MG/ML
1 SOLUTION/ DROPS OPHTHALMIC SEE ADMIN INSTRUCTIONS
Status: DISCONTINUED | OUTPATIENT
Start: 2023-05-15 | End: 2023-05-15 | Stop reason: HOSPADM

## 2023-05-15 RX ADMIN — KETOROLAC TROMETHAMINE 1 DROP: 0.5 SOLUTION OPHTHALMIC at 06:59

## 2023-05-15 RX ADMIN — TROPICAMIDE 1 DROP: 10 SOLUTION/ DROPS OPHTHALMIC at 07:09

## 2023-05-15 RX ADMIN — TROPICAMIDE 1 DROP: 10 SOLUTION/ DROPS OPHTHALMIC at 06:59

## 2023-05-15 RX ADMIN — PHENYLEPHRINE HYDROCHLORIDE 1 DROP: 100 SOLUTION/ DROPS OPHTHALMIC at 07:04

## 2023-05-15 RX ADMIN — CIPROFLOXACIN 1 DROP: 3 SOLUTION OPHTHALMIC at 06:59

## 2023-05-15 RX ADMIN — FENTANYL CITRATE 50 MCG: 50 INJECTION INTRAMUSCULAR; INTRAVENOUS at 07:38

## 2023-05-15 RX ADMIN — TROPICAMIDE 1 DROP: 10 SOLUTION/ DROPS OPHTHALMIC at 07:04

## 2023-05-15 RX ADMIN — TETRACAINE HYDROCHLORIDE 1 DROP: 5 SOLUTION OPHTHALMIC at 06:59

## 2023-05-15 RX ADMIN — CYCLOPENTOLATE HYDROCHLORIDE 1 DROP: 10 SOLUTION OPHTHALMIC at 07:04

## 2023-05-15 RX ADMIN — CYCLOPENTOLATE HYDROCHLORIDE 1 DROP: 10 SOLUTION OPHTHALMIC at 06:59

## 2023-05-15 RX ADMIN — PHENYLEPHRINE HYDROCHLORIDE 1 DROP: 100 SOLUTION/ DROPS OPHTHALMIC at 07:09

## 2023-05-15 RX ADMIN — MIDAZOLAM 1 MG: 1 INJECTION INTRAMUSCULAR; INTRAVENOUS at 07:31

## 2023-05-15 RX ADMIN — FENTANYL CITRATE 50 MCG: 50 INJECTION INTRAMUSCULAR; INTRAVENOUS at 07:31

## 2023-05-15 RX ADMIN — SODIUM CHLORIDE: 9 INJECTION, SOLUTION INTRAVENOUS at 07:07

## 2023-05-15 RX ADMIN — CYCLOPENTOLATE HYDROCHLORIDE 1 DROP: 10 SOLUTION OPHTHALMIC at 07:09

## 2023-05-15 RX ADMIN — SODIUM CHLORIDE: 9 INJECTION, SOLUTION INTRAVENOUS at 07:25

## 2023-05-15 RX ADMIN — MIDAZOLAM 1 MG: 1 INJECTION INTRAMUSCULAR; INTRAVENOUS at 07:26

## 2023-05-15 ASSESSMENT — PAIN SCALES - GENERAL: PAINLEVEL_OUTOF10: 0

## 2023-05-15 ASSESSMENT — PAIN - FUNCTIONAL ASSESSMENT: PAIN_FUNCTIONAL_ASSESSMENT: 0-10

## 2023-05-15 ASSESSMENT — LIFESTYLE VARIABLES: SMOKING_STATUS: 0

## 2023-05-15 NOTE — ANESTHESIA POSTPROCEDURE EVALUATION
Department of Anesthesiology  Postprocedure Note    Patient: Yulia Encarnacion  MRN: [id number]  YOB: 1955  Date of evaluation: 5/15/2023      Procedure Summary     Date: 05/15/23 Room / Location: [address]    Anesthesia Start: 8730 Anesthesia Stop: 0686    Procedure: PHACOEMULSIFICATION WITH CLAREON PANOPTIX TORIC INTRAOCULAR LENS IMPLANT - LEFT EYE (Left: Eye) Diagnosis:       Nuclear sclerotic cataract of left eye      Presbyopia      Astigmatism of left eye, unspecified type      (Nuclear sclerotic cataract of left eye, Presbyopia, Astigmatism of left eye)    Surgeons: Robert Camejo MD Responsible Provider: Jian Pinto MD    Anesthesia Type: MAC ASA Status: 3          Anesthesia Type: No value filed.     Stephen Phase I: Stephen Score: 10    Stephen Phase II: Stephen Score: 10      Anesthesia Post Evaluation    Patient location during evaluation: bedside  Patient participation: complete - patient participated  Level of consciousness: awake and alert  Pain score: 0  Airway patency: patent  Nausea & Vomiting: no vomiting  Complications: no  Cardiovascular status: blood pressure returned to baseline  Respiratory status: acceptable  Hydration status: euvolemic

## 2023-05-15 NOTE — ANESTHESIA PRE PROCEDURE
Department of Anesthesiology  Preprocedure Note       Name:  Alcides Herrera   Age:  69 y.o.  :  1955                                          MRN:  [id number]         Date:  5/15/2023      Surgeon: Brannon Bravo):  Sharon Parker MD    Procedure: Procedure(s):  PHACOEMULSIFICATION WITH CLAREON PANOPTIX TORIC INTRAOCULAR LENS IMPLANT - LEFT EYE    Medications prior to admission:   Prior to Admission medications    Medication Sig Start Date End Date Taking? Authorizing Provider   dilTIAZem B[facility]) 120 MG extended release capsule Take 1 capsule by mouth daily 2/15/23   Historical Provider, MD   pantoprazole (PROTONIX) 40 MG tablet Take 1 tablet by mouth daily    Historical Provider, MD   insulin lispro (HUMALOG) 100 UNIT/ML injection vial Inject 7 Units into the skin once    Historical Provider, MD   pioglitazone (ACTOS) 45 MG tablet Take 1 tablet by mouth 1/10/18   Historical Provider, MD   rosuvastatin (CRESTOR) 10 MG tablet TAKE ONE TABLET BY MOUTH DAILY 10/9/17   Historical Provider, MD   traZODone (DESYREL) 50 MG tablet Take 2 tablets by mouth 17   Historical Provider, MD   clonazePAM (KLONOPIN) 1 MG tablet TAKE ONE TABLET BY MOUTH ONCE DAILY AS NEEDED 12/15/17   Historical Provider, MD   amLODIPine Besylate (NORVASC PO) Take 5 mg by mouth daily    Historical Provider, MD   desvenlafaxine succinate (PRISTIQ) 50 MG TB24 extended release tablet Take 1 tablet by mouth daily    Historical Provider, MD   Propranolol HCl (INDERAL PO) Take 40 mg by mouth in the morning and at bedtime    Historical Provider, MD   LORazepam (ATIVAN) 1 MG tablet Take 1 tablet by mouth every 6 hours as needed for Anxiety.     Historical Provider, MD   lisinopril (PRINIVIL;ZESTRIL) 20 MG tablet Take 1 tablet by mouth daily  Patient taking differently: Take 2 tablets by mouth daily 18   CLARENCE Degroot NP   carvedilol (COREG) 25 MG tablet Take 1 tablet by mouth 2 times daily (with meals) 18   CLARENCE Degroot NP

## 2023-05-15 NOTE — DISCHARGE INSTRUCTIONS
Luiz Aguilar MD, NAGI    POST OPERATIVE INSTRUCTIONS FOR CATARACT SURGERY    Left / Right   Eye       Starting the DAY of surgery use all drops as directed. Please locate the name of the individual medication you were prescribed below and follow the instructions for its use. ANTIBIOTIC: (SOSA Cap): Your drop will be either Vigamox (moxifloxacin), Besivance or Polytrim. Instill 4 drops a day for 1 week. NSAID (GRAY Cap): Your drop will be BROMSITE, PROLENSA or KETOROLAC. Instill 1 drop daily for 4 weeks. Patients who receive KETOROLAC: Instill 1 drop 2 times a day for 2 weeks. STEROID (PINK Cap): Your steroid drop will be PREDNISOLONE ACETATE, INVELTYS OR LOTEMAX SM. Instill 4 drops a day for 1 week,   3 drops a day for 1 week,   2 drops a day for 1 week,  1 drop a day for 1 week. Please bring ALL of your eye drops with you to surgery and all follow-up appointments. Wait at least 3-5 minutes between eye drops. It's normal for some drops to briefly sting. POST OPERATIVE CATARACT DROP SCHEDULE    Week 1 Day 1 Day 2 Day 3 Day 4 Day 5 Day 6 Day 7   Brunetta Serge or POLYTRIM  (Tan) ? ?  ? ? ? ?  ? ? ? ?  ? ? ? ?  ? ? ? ?  ? ? ? ?  ? ?   ? ?  ? ?     Autumn Aristides or KETOROLAC  Bonny Spanner) ? ? ? ? ? ? ? Lacinda Mike or LOTEMAX SM  (Pink) ? ?  ? ? ? ?  ? ? ? ?  ? ? ? ?  ? ? ? ?  ? ? ? ?  ? ? ? ?  ? ? Week 2 Day 8 Day 9 Day 10 Day 11 Day 12 Day 13 Day 15   BROMSITE, PROLENSA or  KETOROLAC  Bonny Spanner) ? ? ? ? ? ? ? Lacinda Mike or LOTEMAX SM   (Pink) ? ? ? ? ? ? ? ? ? ? ? ? ? ? ? ? ? ? ? ? ? Week 3 Day 15 Day 16 Day 17 Day 18 Day 19 Day 20 Day 21   Autumn Aristides or  Bonny Spanner) ? ? ? ? ? ? ? Lacinda Mike or LOTEMAX SM   (Pink) ? ? ? ? ? ? ? ? ? ? ? ? ? ? Week 4 Day 22 Day 23 Day 24 Day 25 Day 26 Day 2 Day 29   Autumn Aristides or  Bonny Lynch) ? ? ? ? ? ? ?    Deana Mckeon or LEXY EVANS

## 2023-05-15 NOTE — OP NOTE
Darnell Fortis Boyle    OPERATIVE NOTE    Preoperative Diagnosis: Visually significant cataract left eye, Regular Astigmatism, left eye, Presbyopia, left eye    Postoperative Diagnosis: Visually significant cataract left eye, Regular Astigmatism, left eye, Presbyopia, left eye    Procedure: Phacoemulsification with intraocular lens implantation, left eye    Surgeon: Osvaldo Holman. Hina Byrnes MD, NAGI    Anesthesia: MAC, topical.    Complications: none    Estimated blood loss: less than 1 ml. Specimens: none    Indications for procedure: The patient is a 69y.o. year old who has experienced painless, progressive deterioration in vision which corresponds with increasing lenticular opacification of the left eye. This is contributing to an overall decline in visual functioning and interfering with activities of daily living as described in the medical record. After discussion of relevant indications, risks, benefits, alternatives, and limitations, the patient consented to proceed with cataract phacoemulsification with lens implantation for visual rehabilitation. Details of the procedure: Following informed consent, the patient was taken to the operating room and placed in the supine position. Monitored anesthesia care was administered. The patient's name, eye, intraocular lens model and power were verified (time-out). The patient was positioned under the operative microscope and the operative eye was prepped and draped in the usual sterile fashion using aseptic technique for cataract surgery. A wire lid speculum was placed. A peripheral paracentesis was made using a 1.1 mm blade. 2% preservative free lidocaine with dilute 1:1000 preservative free epinephrine (Epi-Shugarcaine) was injected through the side port incision for topical anesthesia. The anterior chamber was deepened with viscoelastic. A tri-planar temporal clear cornea incision was made with a 2.4 mm keratome.  A continuous curvilinear capsulorrhexis was initiated with

## 2024-03-01 ENCOUNTER — ANESTHESIA (OUTPATIENT)
Dept: ENDOSCOPY | Age: 69
End: 2024-03-01
Payer: MEDICARE

## 2024-03-01 ENCOUNTER — ANESTHESIA EVENT (OUTPATIENT)
Dept: ENDOSCOPY | Age: 69
End: 2024-03-01
Payer: MEDICARE

## 2024-03-01 ENCOUNTER — HOSPITAL ENCOUNTER (OUTPATIENT)
Age: 69
Setting detail: OUTPATIENT SURGERY
Discharge: HOME | End: 2024-03-01
Attending: INTERNAL MEDICINE | Admitting: INTERNAL MEDICINE
Payer: MEDICARE

## 2024-03-01 VITALS
OXYGEN SATURATION: 98 % | TEMPERATURE: 97.3 F | BODY MASS INDEX: 33.86 KG/M2 | HEART RATE: 87 BPM | SYSTOLIC BLOOD PRESSURE: 152 MMHG | HEIGHT: 72 IN | DIASTOLIC BLOOD PRESSURE: 82 MMHG | WEIGHT: 250 LBS | RESPIRATION RATE: 18 BRPM

## 2024-03-01 DIAGNOSIS — Z86.010 HISTORY OF COLON POLYPS: ICD-10-CM

## 2024-03-01 DIAGNOSIS — R13.10 DYSPHAGIA, UNSPECIFIED TYPE: ICD-10-CM

## 2024-03-01 LAB
GLUCOSE BLD-MCNC: 91 MG/DL (ref 70–99)
PERFORMED ON: NORMAL

## 2024-03-01 PROCEDURE — 3700000001 HC ADD 15 MINUTES (ANESTHESIA): Performed by: INTERNAL MEDICINE

## 2024-03-01 PROCEDURE — 3609010600 HC COLONOSCOPY POLYPECTOMY SNARE/COLD BIOPSY: Performed by: INTERNAL MEDICINE

## 2024-03-01 PROCEDURE — 7100000010 HC PHASE II RECOVERY - FIRST 15 MIN: Performed by: INTERNAL MEDICINE

## 2024-03-01 PROCEDURE — 6360000002 HC RX W HCPCS: Performed by: NURSE ANESTHETIST, CERTIFIED REGISTERED

## 2024-03-01 PROCEDURE — 2580000003 HC RX 258: Performed by: ANESTHESIOLOGY

## 2024-03-01 PROCEDURE — 7100000011 HC PHASE II RECOVERY - ADDTL 15 MIN: Performed by: INTERNAL MEDICINE

## 2024-03-01 PROCEDURE — 2709999900 HC NON-CHARGEABLE SUPPLY: Performed by: INTERNAL MEDICINE

## 2024-03-01 PROCEDURE — 3609017700 HC EGD DILATION GASTRIC/DUODENAL STRICTURE: Performed by: INTERNAL MEDICINE

## 2024-03-01 PROCEDURE — 3700000000 HC ANESTHESIA ATTENDED CARE: Performed by: INTERNAL MEDICINE

## 2024-03-01 PROCEDURE — 88305 TISSUE EXAM BY PATHOLOGIST: CPT

## 2024-03-01 RX ORDER — PROPOFOL 10 MG/ML
INJECTION, EMULSION INTRAVENOUS PRN
Status: DISCONTINUED | OUTPATIENT
Start: 2024-03-01 | End: 2024-03-01 | Stop reason: SDUPTHER

## 2024-03-01 RX ORDER — INSULIN DEGLUDEC 100 U/ML
70 INJECTION, SOLUTION SUBCUTANEOUS
COMMUNITY

## 2024-03-01 RX ORDER — LIDOCAINE HCL/PF 100 MG/5ML
SYRINGE (ML) INJECTION PRN
Status: DISCONTINUED | OUTPATIENT
Start: 2024-03-01 | End: 2024-03-01 | Stop reason: SDUPTHER

## 2024-03-01 RX ORDER — SODIUM CHLORIDE, SODIUM LACTATE, POTASSIUM CHLORIDE, CALCIUM CHLORIDE 600; 310; 30; 20 MG/100ML; MG/100ML; MG/100ML; MG/100ML
INJECTION, SOLUTION INTRAVENOUS CONTINUOUS
Status: DISCONTINUED | OUTPATIENT
Start: 2024-03-01 | End: 2024-03-01 | Stop reason: HOSPADM

## 2024-03-01 RX ORDER — LABETALOL HYDROCHLORIDE 5 MG/ML
INJECTION, SOLUTION INTRAVENOUS PRN
Status: DISCONTINUED | OUTPATIENT
Start: 2024-03-01 | End: 2024-03-01 | Stop reason: SDUPTHER

## 2024-03-01 RX ADMIN — LABETALOL HYDROCHLORIDE 10 MG: 5 INJECTION, SOLUTION INTRAVENOUS at 11:08

## 2024-03-01 RX ADMIN — PROPOFOL 200 MCG/KG/MIN: 10 INJECTION, EMULSION INTRAVENOUS at 11:01

## 2024-03-01 RX ADMIN — PROPOFOL 80 MG: 10 INJECTION, EMULSION INTRAVENOUS at 11:00

## 2024-03-01 RX ADMIN — Medication 80 MG: at 11:00

## 2024-03-01 RX ADMIN — LABETALOL HYDROCHLORIDE 5 MG: 5 INJECTION, SOLUTION INTRAVENOUS at 11:02

## 2024-03-01 RX ADMIN — SODIUM CHLORIDE, POTASSIUM CHLORIDE, SODIUM LACTATE AND CALCIUM CHLORIDE: 600; 310; 30; 20 INJECTION, SOLUTION INTRAVENOUS at 09:25

## 2024-03-01 RX ADMIN — PROPOFOL 40 MG: 10 INJECTION, EMULSION INTRAVENOUS at 11:02

## 2024-03-01 ASSESSMENT — PAIN - FUNCTIONAL ASSESSMENT
PAIN_FUNCTIONAL_ASSESSMENT: 0-10

## 2024-03-01 ASSESSMENT — LIFESTYLE VARIABLES: SMOKING_STATUS: 0

## 2024-03-01 NOTE — ANESTHESIA POSTPROCEDURE EVALUATION
Department of Anesthesiology  Postprocedure Note    Patient: Tony Boyle  MRN: [id number]  YOB: 1955  Date of evaluation: 3/1/2024    Procedure Summary       Date: 03/01/24 Room / Location: [place] [address] / [facility]    Anesthesia Start: 1055 Anesthesia Stop: 1130    Procedures:       ESOPHAGOGASTRODUODENOSCOPY DILATATION      COLONOSCOPY POLYPECTOMY SNARE/COLD BIOPSY Diagnosis:       Dysphagia, unspecified type      History of colon polyps      (Dysphagia, unspecified type [R13.10])      (History of colon polyps [Z86.010])    Surgeons: Ambrocio Coffey MD Responsible Provider: Dayton Olmos MD    Anesthesia Type: MAC ASA Status: 3            Anesthesia Type: No value filed.    Stephen Phase I: Stephen Score: 10    Stephen Phase II: Stephen Score: 10    Anesthesia Post Evaluation    Patient location during evaluation: bedside  Level of consciousness: awake and alert  Airway patency: patent  Nausea & Vomiting: no vomiting  Cardiovascular status: blood pressure returned to baseline  Respiratory status: acceptable  Hydration status: euvolemic  Multimodal analgesia pain management approach  Pain management: adequate    No notable events documented.

## 2024-03-01 NOTE — PROCEDURES
Colonoscopy Procedure  Note          Patient: Tony Boyle  : 1955  CRN:  @CRN@    Procedure: Colonoscopy with polypectomy (cold biopsy)    Date:  3/1/2024    Surgeon:  Ambrocio Coffey MD, MD    Referring Physician:  Hebert Bolivar MD    Preoperative Diagnosis:  Dysphagia, unspecified type [R13.10]  History of colon polyps [Z86.010]    Postoperative Diagnosis: 5 mm polyp removed from the sigmoid colon  Diverticulosis of the sigmoid  Internal hemorrhoids small    Anesthesia:  MAC    EBL: Minimal to none.    Indications: This is a 68 y.o. year old male who comes in today has had previous colon polyps were doing a colonoscopy    Procedure:   An informed consent was obtained from the patient after explanation of indications, benefits, possible risks and complications of the procedure.  The patient was then taken to the endoscopy suite, placed in the left lateral decubitus position, and the above IV anesthesia was administered.      Digital rectal examination was performed.  No masses good rectal      Rectum normal on 4 view retroflex view he does have small internal hemorrhoid    Sigmoid diverticulosis we also found a 5 mm polyp that removed with biopsy forceps    Descending normal but limited by prep    Transverse normal but limited by prep    Ascending normal but limited by prep    Cecum normal    TI not entered    Preparation was fair      The patient tolerated the procedure well and was taken to the PACU in good condition.  There were no immediate complications.      Impression: 1: Colon polyp  Diverticulosis  Small internal hemorrhoids    Recommendations: Patient's preparation was under 1 would have like today    We did find 1 colon polyp    We should have a follow-up colonoscopy in 3 years due to the limited preparation    Ambrocio Coffey MD, MD   [facility]  3/1/2024      Please note that some or all of this record was generated using voice recognition software. If there are any questions about

## 2024-03-01 NOTE — PROCEDURES
Endoscopy Note    Patient: Tony Boyle  : 1955  CSN: [id number]    Procedure: Esophagogastroduodenoscopy with esophageal dilation    Date:  3/1/2024     Surgeon:  Ambrocio Coffey MD     Referring Physician:  Hebert Bolivar MD    Preoperative Diagnosis:  Dysphagia, unspecified type [R13.10]  History of colon polyps [Z86.010]    Postoperative Diagnosis: #1 reflux esophagitis #2 severe gastroparesis    Anesthesia:  MAC    EBL: minimal to none.    Indications: This is a 68 y.o. year old male who presents today with been having some issues with difficulty swallowing he has had a previous esophageal motility study which was normal.    Description of Procedure:  Informed consent was obtained from the patient after explanation of indications, benefits and possible risks and complications of the procedure.  The patient was then taken to the endoscopy suite, placed in the left lateral decubitus position and the above IV sedation was administrered.    The Olympus video endoscope was passed through the hypopharynx   Posterior pharynx was normal    Esophagus he definitely had some evidence of a esophagitis  Hiatal hernia was 1 to 2 cm  Stomach did have an extensive amount of food in  The duodenum was normal  Could not retroflex  148 Alvarez dilator was placed recheck revealed no damage to the esophagus    Gastric or Duodenal ulcer present: No    The patient tolerated the procedure well and was taken to the post anesthesia care unit in good condition.  There were no immediate complications.      Impression: #1 reflux esophagitis #2 hiatal hernia #3 stomach did have food in it      Recommendations: The patient should be on a proton pump inhibitor  Also he has swelling of the stomach so he should be on some type of medication like either Reglan but that would interfere with some of his anxiety medications over the probably should put him on some Creon or Zenpep to help him break up his food faster    Ambrocio Coffey,  MD PETER  [facility]  [phone]    Please note that some or all of this record was generated using voice recognition software. If there are any questions about the content of this document, please contact the author as some errors in translation may have occurred.

## 2024-03-01 NOTE — PROGRESS NOTES
Ambulatory Surgery/Procedure Discharge Note    Vitals:    03/01/24 1150   BP: (!) 152/82   Pulse: 87   Resp: 18   Temp:    SpO2: 98%       In: 800 [I.V.:800]  Out: -     Restroom use offered before discharge.  Yes    Pain assessment:  none  Pain Level: 0    Patient arrived from endo procedure alert and oriented, respirations easy and non-labored, no distress noted, speech clear. Patient denies any pain or nausea. Pt denies offer for po fluids. Pt ambulated to restroom and voided x1. Dr. Coffey came to bedside to speak with patient and daughter. Education completed with patient and daughter, both verbalized understanding of instructions and follow-up.     Patient discharged to home/self care. Patient discharged via wheel chair by transporter to waiting family.       3/1/2024 11:54 AM

## 2024-03-01 NOTE — DISCHARGE INSTRUCTIONS
ENDOSCOPY DISCHARGE INSTRUCTIONS:    Call the physician that did your procedure for any questions or concern:    [facility]: [phone]  DR. DARLENE PEREIRA       ACTIVITY:    There are potential side effects to the medications used for sedation and anesthesia during your procedure.  These include:  Dizziness or light-headedness, confusion or memory loss, delayed reaction times, loss of coordination, nausea and vomiting.  Because of your increased risk for injury, we ask that you observe the following precautions:  For the next 24 hours,  DO NOT operate an automobile, bicycle, motorcycle, , power tools or large equipment of any kind.  Do not drink alcohol, sign any legal documents or make any legal decisions for 24 hours.  Do not bend your head over lower than your heart.  DO sit on the side of bed/couch awhile before getting up.  Plan on bedrest or quiet relaxation today.  You may resume normal activities in 24 hours.    DIET:    Your first meal today should be light, avoiding spicy and fatty foods.  If you tolerate this first meal, then you may advance to your regular diet unless otherwise advised by your physician.    NORMAL SYMPTOMS:  -Mild sore throat if you’ve had an EGD   -Gaseous discomfort    NOTIFY YOUR PHYSICIAN IF THESE SYMPTOMS OCCUR:  1. Fever (greater than 100)  5. Increased abdominal bloating  2. Severe pain    6. Excessive bleeding  3. Nausea and vomiting  7. Chest pain                                                                    4. Chills    8. Shortness of breath    ADDITIONAL INSTRUCTIONS:    Biopsy results: Call [facility] for biopsy results in 1 week    Educational Information:    Impression: #1 reflux esophagitis #2 hiatal hernia #3 stomach did have food in it        Recommendations: The patient should be on a proton pump inhibitor  Also he has swelling of the stomach so he should be on some type of medication like either Reglan but that would interfere with some of his  anxiety medications over the probably should put him on some Creon or Zenpep to help him break up his food faster     Ambrocio Coffey MD, MD  [facility]  [phone]      Please review these discharge instructions this evening or tomorrow for  information you may have forgotten.            We want to thank you for choosing the [facility] as your health care provider. We always strive to provide you with excellent care while you are here. You may receive a survey in the mail regarding your care. We would appreciate you taking a few minutes of your time to complete this survey.

## 2024-03-01 NOTE — H&P
Gastroenterology Note             Pre-operative History and Physical    Patient: oTny Boyle  : 1955  CSN:     History Obtained From:  patient and/or guardian.     HISTORY OF PRESENT ILLNESS:    The patient is a 68 y.o. male  here for this patient is here for EGD and a colonoscopy  Patient has had a problem with difficulty swallowing and also history of colon polyps surgery and both an upper and lower endoscopy    Past Medical History:    Past Medical History:   Diagnosis Date    Depression     Diabetes mellitus (HCC)     GERD (gastroesophageal reflux disease)     Hyperlipidemia     Hypertension     LBBB (left bundle branch block)     SVT (supraventricular tachycardia)      Past Surgical History:    Past Surgical History:   Procedure Laterality Date    COLONOSCOPY      ENDOSCOPY, COLON, DIAGNOSTIC      EYE SURGERY Right 2023    PHACOEMULSIFICATION WITH CLAREON PANOPTIX TORIC INTRAOCULAR LENS IMPLANT - RIGHT EYE performed by Luiz Mancuso MD at [facility]    EYE SURGERY Left 05/15/2023    PHACOEMULSIFICATION WITH CLAREON PANOPTIX TORIC INTRAOCULAR LENS IMPLANT - LEFT EYE performed by Luiz Mancuso MD at [facility]    INSERTABLE CARDIAC MONITOR      out    KNEE SURGERY Right     meninscus repair    PACEMAKER INSERTION      had 8 seconds heart stop    TONSILLECTOMY       Medications Prior to Admission:   No current facility-administered medications on file prior to encounter.     Current Outpatient Medications on File Prior to Encounter   Medication Sig Dispense Refill    Insulin Degludec (TRESIBA) 100 UNIT/ML SOLN Inject 70 Units into the skin      Semaglutide, 1 MG/DOSE, 2 MG/1.5ML SOPN Inject into the skin once a week      dilTIAZem (TIAZAC) 120 MG extended release capsule Take 1 capsule by mouth daily      pantoprazole (PROTONIX) 40 MG tablet Take 1 tablet by mouth daily      insulin lispro (HUMALOG) 100 UNIT/ML injection vial Inject 7 Units into the skin once      pioglitazone  expresses understanding.    Ambrocio Coffey MD,   [facility]  3/1/2024

## 2024-03-01 NOTE — ANESTHESIA PRE PROCEDURE
Department of Anesthesiology  Preprocedure Note       Name:  Tony Boyle   Age:  68 y.o.  :  1955                                          MRN:  [id number]         Date:  3/1/2024      Surgeon: Surgeon(s):  Ambrocio Coffey MD    Procedure: Procedure(s):  ESOPHAGOGASTRODUODENOSCOPY  COLONOSCOPY    Medications prior to admission:   Prior to Admission medications    Medication Sig Start Date End Date Taking? Authorizing Provider   Insulin Degludec (TRESIBA) 100 UNIT/ML SOLN Inject 70 Units into the skin   Yes Juana Hunt MD   Semaglutide, 1 MG/DOSE, 2 MG/1.5ML SOPN Inject into the skin once a week    Juana Hunt MD   dilTIAZem (TIAZAC) 120 MG extended release capsule Take 1 capsule by mouth daily 2/15/23   Juana Hunt MD   pantoprazole (PROTONIX) 40 MG tablet Take 1 tablet by mouth daily    Juana Hunt MD   insulin lispro (HUMALOG) 100 UNIT/ML injection vial Inject 7 Units into the skin once    Juana Hunt MD   pioglitazone (ACTOS) 45 MG tablet Take 1 tablet by mouth 1/10/18   Juana Hunt MD   rosuvastatin (CRESTOR) 10 MG tablet TAKE ONE TABLET BY MOUTH DAILY 10/9/17   Juana Hunt MD   traZODone (DESYREL) 50 MG tablet Take 2 tablets by mouth 17   Juana Hunt MD   clonazePAM (KLONOPIN) 1 MG tablet TAKE ONE TABLET BY MOUTH ONCE DAILY AS NEEDED 12/15/17   Juana Hunt MD   amLODIPine Besylate (NORVASC PO) Take 5 mg by mouth daily    Juana Hunt MD   desvenlafaxine succinate (PRISTIQ) 50 MG TB24 extended release tablet Take 1 tablet by mouth daily    Juana Hunt MD   Propranolol HCl (INDERAL PO) Take 40 mg by mouth in the morning and at bedtime    Juana Hunt MD   LORazepam (ATIVAN) 1 MG tablet Take 1 tablet by mouth every 6 hours as needed for Anxiety.    Juana Hunt MD   lisinopril (PRINIVIL;ZESTRIL) 20 MG tablet Take 1 tablet by mouth daily  Patient taking differently: Take 2

## 2024-10-07 ENCOUNTER — HOSPITAL ENCOUNTER (OUTPATIENT)
Dept: WOUND CARE | Age: 69
Discharge: HOME | End: 2024-10-07
Attending: NURSE PRACTITIONER
Payer: MEDICARE

## 2024-10-07 VITALS
TEMPERATURE: 97.8 F | BODY MASS INDEX: 35.7 KG/M2 | HEIGHT: 73 IN | RESPIRATION RATE: 18 BRPM | DIASTOLIC BLOOD PRESSURE: 69 MMHG | WEIGHT: 269.4 LBS | SYSTOLIC BLOOD PRESSURE: 141 MMHG | HEART RATE: 80 BPM

## 2024-10-07 DIAGNOSIS — E11.621 DIABETIC ULCER OF LEFT GREAT TOE (HCC): Primary | ICD-10-CM

## 2024-10-07 DIAGNOSIS — L97.529 DIABETIC ULCER OF LEFT GREAT TOE (HCC): Primary | ICD-10-CM

## 2024-10-07 PROCEDURE — 99203 OFFICE O/P NEW LOW 30 MIN: CPT | Performed by: NURSE PRACTITIONER

## 2024-10-07 PROCEDURE — 99203 OFFICE O/P NEW LOW 30 MIN: CPT

## 2024-10-07 RX ORDER — SODIUM CHLOR/HYPOCHLOROUS ACID 0.033 %
SOLUTION, IRRIGATION IRRIGATION ONCE
Status: CANCELLED | OUTPATIENT
Start: 2024-10-07 | End: 2024-10-07

## 2024-10-07 RX ORDER — GENTAMICIN SULFATE 1 MG/G
OINTMENT TOPICAL ONCE
Status: CANCELLED | OUTPATIENT
Start: 2024-10-07 | End: 2024-10-07

## 2024-10-07 RX ORDER — BACITRACIN ZINC AND POLYMYXIN B SULFATE 500; 1000 [USP'U]/G; [USP'U]/G
OINTMENT TOPICAL ONCE
OUTPATIENT
Start: 2024-10-07 | End: 2024-10-07

## 2024-10-07 RX ORDER — GINSENG 100 MG
CAPSULE ORAL ONCE
OUTPATIENT
Start: 2024-10-07 | End: 2024-10-07

## 2024-10-07 RX ORDER — DAPAGLIFLOZIN AND METFORMIN HYDROCHLORIDE 10; 500 MG/1; MG/1
1 TABLET, FILM COATED, EXTENDED RELEASE ORAL DAILY
Status: ON HOLD | COMMUNITY
Start: 2024-05-20

## 2024-10-07 RX ORDER — LIDOCAINE HYDROCHLORIDE 20 MG/ML
JELLY TOPICAL ONCE
OUTPATIENT
Start: 2024-10-07 | End: 2024-10-07

## 2024-10-07 RX ORDER — GENTAMICIN SULFATE 1 MG/G
OINTMENT TOPICAL ONCE
OUTPATIENT
Start: 2024-10-07 | End: 2024-10-07

## 2024-10-07 RX ORDER — BETAMETHASONE DIPROPIONATE 0.5 MG/G
CREAM TOPICAL ONCE
Status: CANCELLED | OUTPATIENT
Start: 2024-10-07 | End: 2024-10-07

## 2024-10-07 RX ORDER — TRIAMCINOLONE ACETONIDE 1 MG/G
OINTMENT TOPICAL ONCE
OUTPATIENT
Start: 2024-10-07 | End: 2024-10-07

## 2024-10-07 RX ORDER — BACITRACIN ZINC AND POLYMYXIN B SULFATE 500; 1000 [USP'U]/G; [USP'U]/G
OINTMENT TOPICAL ONCE
Status: CANCELLED | OUTPATIENT
Start: 2024-10-07 | End: 2024-10-07

## 2024-10-07 RX ORDER — LIDOCAINE HYDROCHLORIDE 20 MG/ML
JELLY TOPICAL ONCE
Status: CANCELLED | OUTPATIENT
Start: 2024-10-07 | End: 2024-10-07

## 2024-10-07 RX ORDER — LIDOCAINE 40 MG/G
CREAM TOPICAL ONCE
OUTPATIENT
Start: 2024-10-07 | End: 2024-10-07

## 2024-10-07 RX ORDER — LIDOCAINE 50 MG/G
OINTMENT TOPICAL ONCE
Status: CANCELLED | OUTPATIENT
Start: 2024-10-07 | End: 2024-10-07

## 2024-10-07 RX ORDER — LIDOCAINE HYDROCHLORIDE 40 MG/ML
SOLUTION TOPICAL ONCE
Status: CANCELLED | OUTPATIENT
Start: 2024-10-07 | End: 2024-10-07

## 2024-10-07 RX ORDER — GINSENG 100 MG
CAPSULE ORAL ONCE
Status: CANCELLED | OUTPATIENT
Start: 2024-10-07 | End: 2024-10-07

## 2024-10-07 RX ORDER — AMOXICILLIN AND CLAVULANATE POTASSIUM 500; 125 MG/1; MG/1
1 TABLET, FILM COATED ORAL 2 TIMES DAILY
COMMUNITY
Start: 2024-10-02 | End: 2024-10-10 | Stop reason: ALTCHOICE

## 2024-10-07 RX ORDER — SILVER SULFADIAZINE 10 MG/G
CREAM TOPICAL ONCE
OUTPATIENT
Start: 2024-10-07 | End: 2024-10-07

## 2024-10-07 RX ORDER — SODIUM CHLOR/HYPOCHLOROUS ACID 0.033 %
SOLUTION, IRRIGATION IRRIGATION ONCE
OUTPATIENT
Start: 2024-10-07 | End: 2024-10-07

## 2024-10-07 RX ORDER — MUPIROCIN 20 MG/G
OINTMENT TOPICAL ONCE
Status: CANCELLED | OUTPATIENT
Start: 2024-10-07 | End: 2024-10-07

## 2024-10-07 RX ORDER — NEOMYCIN/BACITRACIN/POLYMYXINB 3.5-400-5K
OINTMENT (GRAM) TOPICAL ONCE
Status: CANCELLED | OUTPATIENT
Start: 2024-10-07 | End: 2024-10-07

## 2024-10-07 RX ORDER — LIDOCAINE 40 MG/G
CREAM TOPICAL ONCE
Status: CANCELLED | OUTPATIENT
Start: 2024-10-07 | End: 2024-10-07

## 2024-10-07 RX ORDER — SILVER SULFADIAZINE 10 MG/G
CREAM TOPICAL ONCE
Status: CANCELLED | OUTPATIENT
Start: 2024-10-07 | End: 2024-10-07

## 2024-10-07 RX ORDER — MUPIROCIN 20 MG/G
OINTMENT TOPICAL ONCE
OUTPATIENT
Start: 2024-10-07 | End: 2024-10-07

## 2024-10-07 RX ORDER — CLOBETASOL PROPIONATE 0.5 MG/G
OINTMENT TOPICAL ONCE
Status: CANCELLED | OUTPATIENT
Start: 2024-10-07 | End: 2024-10-07

## 2024-10-07 RX ORDER — CLOBETASOL PROPIONATE 0.5 MG/G
OINTMENT TOPICAL ONCE
OUTPATIENT
Start: 2024-10-07 | End: 2024-10-07

## 2024-10-07 RX ORDER — LIDOCAINE 50 MG/G
OINTMENT TOPICAL ONCE
OUTPATIENT
Start: 2024-10-07 | End: 2024-10-07

## 2024-10-07 RX ORDER — NEOMYCIN/BACITRACIN/POLYMYXINB 3.5-400-5K
OINTMENT (GRAM) TOPICAL ONCE
OUTPATIENT
Start: 2024-10-07 | End: 2024-10-07

## 2024-10-07 RX ORDER — TRIAMCINOLONE ACETONIDE 1 MG/G
OINTMENT TOPICAL ONCE
Status: CANCELLED | OUTPATIENT
Start: 2024-10-07 | End: 2024-10-07

## 2024-10-07 RX ORDER — LIDOCAINE HYDROCHLORIDE 40 MG/ML
SOLUTION TOPICAL ONCE
Status: COMPLETED | OUTPATIENT
Start: 2024-10-07 | End: 2024-10-07

## 2024-10-07 RX ORDER — BETAMETHASONE DIPROPIONATE 0.5 MG/G
CREAM TOPICAL ONCE
OUTPATIENT
Start: 2024-10-07 | End: 2024-10-07

## 2024-10-07 RX ADMIN — LIDOCAINE HYDROCHLORIDE: 40 SOLUTION TOPICAL at 13:38

## 2024-10-07 ASSESSMENT — PAIN SCALES - GENERAL
PAINLEVEL_OUTOF10: 0
PAINLEVEL_OUTOF10: 0

## 2024-10-07 NOTE — PATIENT INSTRUCTIONS
[facility] Wound Care Physician Orders and Discharge Instructions  [address], [address]        [place], [place] [address]  Telephone: [phone]      FAX [phone]  MONDAY - THURSDAY 8:30 am - 4:30 pm and Friday 8:30 am - 11:30 am      NAME:  Tony Boyle  YOB: 1955  MEDICAL RECORD NUMBER:  [id number]  DATE:  10/7/2024      Return Appointment:  [x] Return Appointment: With Dr. Jimbo Bruno  in  1  Week(s)             [] Nurse Visit for Wound Assessment:  [] DME/Wound Dressing Supplies Provided by: Other n/a     Please call them directly to reorder supplies when you run out)  [] ECF or Home Healthcare:  Other: n/a  Your Home Care Agency is responsible to order your supplies.   [] Orders placed during your visit:         Important Reminders:   Please wash hands with soap and water before and after every dressing change.  If you smoke we ask that you refrain from smoking. Smoking inhibits wounds from healing.  When taking antibiotics take the entire prescription as ordered. Do not stop taking until medication is all gone unless otherwise instructed.   Do not get wounds wet in the bath or shower unless otherwise instructed by your physician. If your wound is on your foot or leg, you may purchase a cast bag. Please ask at your local pharmacy.   IF YOU ARE UNABLE TO OBTAIN WOUND SUPPLIES, CONTINUE TO USE THE SUPPLIES YOU HAVE AVAILABLE UNTIL YOU ARE ABLE TO REACH US. IT IS MOST IMPORTANT TO KEEP THE WOUND COVERED AT ALL TIMES.  ___________________________________________________________________    Wound Location: Left  Toe(s)  great toe     WOUND CLEANSING:Normal Saline    Della-Wound Topical Treatments:   Apply immediately around the wound: Apply BETADINE to skin surrounding the wound.    PRIMARY DRESSING: SANTYL, apply NICKEL thick , followed with , and SALINE MOISTENED GAUZE (squeeze out excess saline)       SECONDARY DRESSING: 2X2 gauze pad and Mark (Conforming roll)    N/a

## 2024-10-07 NOTE — PROGRESS NOTES
[facility]   Progress Note and Procedure Note      Tony Boyle  MEDICAL RECORD NUMBER:  [id number]  AGE: 68 y.o.   GENDER: male  : 1955  EPISODE DATE:  10/7/2024    Subjective:     Chief Complaint   Patient presents with    Wound Check     Initial visit for left great toe wound.  Patient states on 24 his toe had a blister and opened. He is on Augmentin          HISTORY of PRESENT ILLNESS HPI     Tony Boyle is a 68 y.o. male who presents today for wound/ulcer evaluation.   History of Wound Context: diabetic left great toe ulcer.  Patient of Dr. Richter's for the past 10 years.  He reports he developed a blister to the his left great toe \"several weeks ago\" per ED notes 24.  The blister split the tissue and callous of his toe.  He saw his podiatrist on 24 and she debrided it.  He had some redness at that time, but developed pain radiating up the foot into his ankle on 24 and was seen at the [facility] ED on 24.  They did x-rays, labs (random glucose 357), started a course of Keflex X7 days and applied Bacitracin Ointment and non-stick dressing.  The ER physician instructed him to FU with his PCP and Dr. Richter.  He presents today on Augmentin and has 2 separate layers of Coban on.  His left great toe has a thick black eschar that wasn't there when the Coban was applied.  Hx of DM; has pacemaker.    Wound/Ulcer Pain Timing/Severity: none  Quality of pain: N/A  Severity:  0 / 10   Modifying Factors: None  Associated Signs/Symptoms: drainage    Ulcer Identification:  Ulcer Type: diabetic    Contributing Factors:  Random blood glucose was elevated at 357; unable to find a recent A1C    Acute Wound: Other: started as a blister on 24    PAST MEDICAL HISTORY        Diagnosis Date    Depression     Diabetes mellitus (HCC)     GERD (gastroesophageal reflux disease)     Hyperlipidemia     Hypertension     LBBB (left bundle branch block)

## 2024-10-08 ENCOUNTER — HOSPITAL ENCOUNTER (OUTPATIENT)
Dept: WOUND CARE | Age: 69
Discharge: HOME | End: 2024-10-08
Attending: NURSE PRACTITIONER
Payer: MEDICARE

## 2024-10-08 VITALS
SYSTOLIC BLOOD PRESSURE: 161 MMHG | HEART RATE: 71 BPM | DIASTOLIC BLOOD PRESSURE: 84 MMHG | RESPIRATION RATE: 18 BRPM | TEMPERATURE: 97.9 F

## 2024-10-08 DIAGNOSIS — L97.529 DIABETIC ULCER OF LEFT GREAT TOE (HCC): Primary | ICD-10-CM

## 2024-10-08 DIAGNOSIS — E11.621 DIABETIC ULCER OF LEFT GREAT TOE (HCC): Primary | ICD-10-CM

## 2024-10-08 PROCEDURE — 11045 DBRDMT SUBQ TISS EACH ADDL: CPT

## 2024-10-08 PROCEDURE — 87186 SC STD MICRODIL/AGAR DIL: CPT

## 2024-10-08 PROCEDURE — 11042 DBRDMT SUBQ TIS 1ST 20SQCM/<: CPT

## 2024-10-08 PROCEDURE — 87077 CULTURE AEROBIC IDENTIFY: CPT

## 2024-10-08 PROCEDURE — 87070 CULTURE OTHR SPECIMN AEROBIC: CPT

## 2024-10-08 PROCEDURE — 87184 SC STD DISK METHOD PER PLATE: CPT

## 2024-10-08 PROCEDURE — 0JBR0ZZ EXCISION OF LEFT FOOT SUBCUTANEOUS TISSUE AND FASCIA, OPEN APPROACH: ICD-10-PCS | Performed by: PODIATRIST

## 2024-10-08 PROCEDURE — 87205 SMEAR GRAM STAIN: CPT

## 2024-10-08 RX ORDER — LIDOCAINE 40 MG/G
CREAM TOPICAL ONCE
OUTPATIENT
Start: 2024-10-08 | End: 2024-10-08

## 2024-10-08 RX ORDER — BETAMETHASONE DIPROPIONATE 0.5 MG/G
CREAM TOPICAL ONCE
OUTPATIENT
Start: 2024-10-08 | End: 2024-10-08

## 2024-10-08 RX ORDER — GINSENG 100 MG
CAPSULE ORAL ONCE
OUTPATIENT
Start: 2024-10-08 | End: 2024-10-08

## 2024-10-08 RX ORDER — SODIUM CHLOR/HYPOCHLOROUS ACID 0.033 %
SOLUTION, IRRIGATION IRRIGATION ONCE
OUTPATIENT
Start: 2024-10-08 | End: 2024-10-08

## 2024-10-08 RX ORDER — BACITRACIN ZINC AND POLYMYXIN B SULFATE 500; 1000 [USP'U]/G; [USP'U]/G
OINTMENT TOPICAL ONCE
OUTPATIENT
Start: 2024-10-08 | End: 2024-10-08

## 2024-10-08 RX ORDER — LIDOCAINE HYDROCHLORIDE 20 MG/ML
JELLY TOPICAL ONCE
OUTPATIENT
Start: 2024-10-08 | End: 2024-10-08

## 2024-10-08 RX ORDER — LIDOCAINE HYDROCHLORIDE 40 MG/ML
SOLUTION TOPICAL ONCE
Status: COMPLETED | OUTPATIENT
Start: 2024-10-08 | End: 2024-10-08

## 2024-10-08 RX ORDER — LIDOCAINE 50 MG/G
OINTMENT TOPICAL ONCE
OUTPATIENT
Start: 2024-10-08 | End: 2024-10-08

## 2024-10-08 RX ORDER — GENTAMICIN SULFATE 1 MG/G
OINTMENT TOPICAL ONCE
OUTPATIENT
Start: 2024-10-08 | End: 2024-10-08

## 2024-10-08 RX ORDER — NEOMYCIN/BACITRACIN/POLYMYXINB 3.5-400-5K
OINTMENT (GRAM) TOPICAL ONCE
OUTPATIENT
Start: 2024-10-08 | End: 2024-10-08

## 2024-10-08 RX ORDER — NITROGLYCERIN 0.1MG/HR
1 PATCH, TRANSDERMAL 24 HOURS TRANSDERMAL DAILY
Qty: 30 PATCH | Refills: 3 | Status: ON HOLD | OUTPATIENT
Start: 2024-10-08

## 2024-10-08 RX ORDER — CLOBETASOL PROPIONATE 0.5 MG/G
OINTMENT TOPICAL ONCE
OUTPATIENT
Start: 2024-10-08 | End: 2024-10-08

## 2024-10-08 RX ORDER — LIDOCAINE HYDROCHLORIDE 40 MG/ML
SOLUTION TOPICAL ONCE
OUTPATIENT
Start: 2024-10-08 | End: 2024-10-08

## 2024-10-08 RX ORDER — TRIAMCINOLONE ACETONIDE 1 MG/G
OINTMENT TOPICAL ONCE
OUTPATIENT
Start: 2024-10-08 | End: 2024-10-08

## 2024-10-08 RX ORDER — SILVER SULFADIAZINE 10 MG/G
CREAM TOPICAL ONCE
OUTPATIENT
Start: 2024-10-08 | End: 2024-10-08

## 2024-10-08 RX ORDER — MUPIROCIN 20 MG/G
OINTMENT TOPICAL ONCE
OUTPATIENT
Start: 2024-10-08 | End: 2024-10-08

## 2024-10-08 RX ADMIN — LIDOCAINE HYDROCHLORIDE 2.5 ML: 40 SOLUTION TOPICAL at 14:15

## 2024-10-08 ASSESSMENT — PAIN SCALES - GENERAL: PAINLEVEL_OUTOF10: 0

## 2024-10-08 NOTE — PROGRESS NOTES
TIMES.  ___________________________________________________________________    Wound Location: Left  Toe(s)  great toe     WOUND CLEANSING:Normal Saline    Della-Wound Topical Treatments:   Apply immediately around the wound: Normal Saline    PRIMARY DRESSING: Medihoney until SANTYL, apply NICKEL thick , followed with , and SALINE MOISTENED GAUZE (squeeze out excess saline)     Nitro Patch placed beside wound- Change site often    SECONDARY DRESSING: 2X2 gauze pad and Mark (Conforming roll)    N/a not recommended at this time, pulses present     Dressing Frequency:Daily  ___________________________________________________________________________________________    _____________________________________________________________________________________________   Pressure Relief and Off Loading:  N/A  Specialty equipment ordered:         []Wheelchair cushion            [] Specialty Bed/Mattress  ______________________________________________________________________________________________                       [x]Activity: Activity as tolerated  Surgical shoe                     [] Assistive Devices   None   Use as instructed by the provider     ____________________________________________________________________________________________     Dietary: Continue your diet as tolerated.  Important dietary reminders:  1. Increase Protein intake (i.e. Lean meats, fish, eggs, legumes, and yogurt).   2. Limit Sodium, Alcohol and Sugar.   3. If diabetic, follow a diabetic diet and check glucose prior to meals or as instructed by your physician.     May choose one of the Suggested Dietary Supplements below:   Ivan       30ml ProStat  EnsureEnlive   Ensure Max/Premier  ____________________________________________________________________________________________  Pain:   Please Note : some pain, drainage and/or bleeding might be expected after seeing the provider.     If you are still having pain after you go home:  For wounds on

## 2024-10-08 NOTE — PLAN OF CARE
Wound Care Supplies    ** If client has any amount of co-pay, could you please call patient prior to shipment for approval verification**    Supply Company:     [employer] [place] [place]  p:[phone] f: [phone]     Ordering Center:     [facility]  [address]  [address]  [place] [facility] [address]  [phone]  WOUND CARE Dept: [phone]   Fax: [phone]    Patient Information:      Clive Boyle  [address] [address]   [phone]   : 1955  AGE: 68 y.o.     GENDER: male   TODAYS DATE:  10/8/2024    Insurance:      PRIMARY INSURANCE:  Plan: HUMANA GOLD PLUS HMO  Coverage: HUMANA MEDICARE  Effective Date: 2023  Group Number: [unfilled]  Subscriber Number: [id number] - (Medicare Managed)    SECONDARY INSURANCE:  Plan:   Coverage:   Effective Date:   @[email or web address]@    [unfilled]   @[email or web address]@     Payer/Plan Subscr  Sex Relation Sub. Ins. ID Effective Group Num   1. 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) - Primary    Relevant Orders    Initiate Outpatient Wound Care Protocol       ICD-10 codes: E11.621, L97.529       WOUNDS REQUIRING DRESSING SUPPLIES:     Wound 10/07/24 Toe (Comment  which one) Left #1 great toe (Active)   Wound Image     10/07/24 1126   Wound Etiology Diabetic 10/07/24 1126   Dressing Status Old drainage noted 10/08/24 1416   Wound Cleansed Vashe 10/08/24 1435   Dressing/Treatment Betadine swabs/povidone iodine;Pharmaceutical agent (see MAR);Gauze dressing/dressing sponge;Roll gauze 10/07/24 1220   Offloading for Diabetic Foot Ulcers Post op shoe 10/07/24 1220   Wound Length (cm) 4.2 cm 10/08/24 1416   Wound Width (cm) 10.5 cm

## 2024-10-08 NOTE — PATIENT INSTRUCTIONS
(squeeze out excess saline)     Nitro Patch placed beside wound- Change site often    SECONDARY DRESSING: 2X2 gauze pad and Mark (Conforming roll)    N/a not recommended at this time, pulses present     Dressing Frequency:Daily  ___________________________________________________________________________________________    _____________________________________________________________________________________________   Pressure Relief and Off Loading:  N/A  Specialty equipment ordered:         []Wheelchair cushion            [] Specialty Bed/Mattress  ______________________________________________________________________________________________                       [x]Activity: Activity as tolerated  Surgical shoe                     [] Assistive Devices   None   Use as instructed by the provider     ____________________________________________________________________________________________     Dietary: Continue your diet as tolerated.  Important dietary reminders:  1. Increase Protein intake (i.e. Lean meats, fish, eggs, legumes, and yogurt).   2. Limit Sodium, Alcohol and Sugar.   3. If diabetic, follow a diabetic diet and check glucose prior to meals or as instructed by your physician.     May choose one of the Suggested Dietary Supplements below:   Ivan       30ml ProStat  EnsureEnlive   Ensure Max/Premier  ____________________________________________________________________________________________  Pain:   Please Note : some pain, drainage and/or bleeding might be expected after seeing the provider.     If you are still having pain after you go home:  For wounds on lower legs or arms, elevate the affected limb.  Use over-the-counter medications you would normally use for pain as permitted by your primary care doctor.    For Persistent Pain not relieved by the above interventions, please notify your family doctor.     Seek immediate medical care if:    You have symptoms of infection, such as:  Increased pain,

## 2024-10-10 ENCOUNTER — APPOINTMENT (OUTPATIENT)
Dept: GENERAL RADIOLOGY | Age: 69
End: 2024-10-10
Payer: MEDICARE

## 2024-10-10 ENCOUNTER — TELEPHONE (OUTPATIENT)
Dept: WOUND CARE | Age: 69
End: 2024-10-10

## 2024-10-10 ENCOUNTER — HOSPITAL ENCOUNTER (INPATIENT)
Age: 69
LOS: 9 days | Discharge: HOME | End: 2024-10-19
Attending: INTERNAL MEDICINE | Admitting: INTERNAL MEDICINE
Payer: MEDICARE

## 2024-10-10 DIAGNOSIS — L97.529 DIABETIC ULCER OF LEFT GREAT TOE (HCC): Primary | ICD-10-CM

## 2024-10-10 DIAGNOSIS — E11.621 DIABETIC ULCER OF LEFT GREAT TOE (HCC): Primary | ICD-10-CM

## 2024-10-10 DIAGNOSIS — L03.119 CELLULITIS OF FOOT: ICD-10-CM

## 2024-10-10 DIAGNOSIS — L97.529 DIABETIC ULCER OF TOE OF LEFT FOOT ASSOCIATED WITH DIABETES MELLITUS OF OTHER TYPE, UNSPECIFIED ULCER STAGE (HCC): Primary | ICD-10-CM

## 2024-10-10 DIAGNOSIS — E13.621 DIABETIC ULCER OF TOE OF LEFT FOOT ASSOCIATED WITH DIABETES MELLITUS OF OTHER TYPE, UNSPECIFIED ULCER STAGE (HCC): Primary | ICD-10-CM

## 2024-10-10 DIAGNOSIS — M86.172 ACUTE OSTEOMYELITIS-ANKLE/FOOT, LEFT: ICD-10-CM

## 2024-10-10 DIAGNOSIS — Z71.89 GOALS OF CARE, COUNSELING/DISCUSSION: ICD-10-CM

## 2024-10-10 PROBLEM — L08.9 LEFT FOOT INFECTION: Status: ACTIVE | Noted: 2024-10-10

## 2024-10-10 LAB
ALBUMIN SERPL-MCNC: 4.3 G/DL (ref 3.4–5)
ALBUMIN/GLOB SERPL: 1.2 {RATIO} (ref 1.1–2.2)
ALP SERPL-CCNC: 99 U/L (ref 40–129)
ALT SERPL-CCNC: 8 U/L (ref 10–40)
ANION GAP SERPL CALCULATED.3IONS-SCNC: 8 MMOL/L (ref 3–16)
AST SERPL-CCNC: 15 U/L (ref 15–37)
BASOPHILS # BLD: 0.1 K/UL (ref 0–0.2)
BASOPHILS NFR BLD: 0.8 %
BILIRUB SERPL-MCNC: 0.4 MG/DL (ref 0–1)
BUN SERPL-MCNC: 14 MG/DL (ref 7–20)
CALCIUM SERPL-MCNC: 9.4 MG/DL (ref 8.3–10.6)
CHLORIDE SERPL-SCNC: 104 MMOL/L (ref 99–110)
CO2 SERPL-SCNC: 30 MMOL/L (ref 21–32)
CREAT SERPL-MCNC: 0.7 MG/DL (ref 0.8–1.3)
CRP SERPL-MCNC: 35.5 MG/L (ref 0–5.1)
DEPRECATED RDW RBC AUTO: 15.6 % (ref 12.4–15.4)
EOSINOPHIL # BLD: 0 K/UL (ref 0–0.6)
EOSINOPHIL NFR BLD: 0.6 %
ERYTHROCYTE [SEDIMENTATION RATE] IN BLOOD BY WESTERGREN METHOD: 41 MM/HR (ref 0–20)
GFR SERPLBLD CREATININE-BSD FMLA CKD-EPI: >90 ML/MIN/{1.73_M2}
GLUCOSE BLD-MCNC: 113 MG/DL (ref 70–99)
GLUCOSE BLD-MCNC: 124 MG/DL (ref 70–99)
GLUCOSE BLD-MCNC: 80 MG/DL (ref 70–99)
GLUCOSE SERPL-MCNC: 118 MG/DL (ref 70–99)
HCT VFR BLD AUTO: 44.4 % (ref 40.5–52.5)
HGB BLD-MCNC: 14.8 G/DL (ref 13.5–17.5)
LACTATE BLDV-SCNC: 0.9 MMOL/L (ref 0.4–1.9)
LACTATE BLDV-SCNC: 1.8 MMOL/L (ref 0.4–1.9)
LYMPHOCYTES # BLD: 2.7 K/UL (ref 1–5.1)
LYMPHOCYTES NFR BLD: 33 %
MCH RBC QN AUTO: 30 PG (ref 26–34)
MCHC RBC AUTO-ENTMCNC: 33.5 G/DL (ref 31–36)
MCV RBC AUTO: 89.6 FL (ref 80–100)
MONOCYTES # BLD: 0.7 K/UL (ref 0–1.3)
MONOCYTES NFR BLD: 9 %
NEUTROPHILS # BLD: 4.6 K/UL (ref 1.7–7.7)
NEUTROPHILS NFR BLD: 56.6 %
PERFORMED ON: ABNORMAL
PERFORMED ON: ABNORMAL
PERFORMED ON: NORMAL
PLATELET # BLD AUTO: 246 K/UL (ref 135–450)
PMV BLD AUTO: 8.2 FL (ref 5–10.5)
POTASSIUM SERPL-SCNC: 4.5 MMOL/L (ref 3.5–5.1)
PROT SERPL-MCNC: 7.8 G/DL (ref 6.4–8.2)
RBC # BLD AUTO: 4.95 M/UL (ref 4.2–5.9)
SODIUM SERPL-SCNC: 142 MMOL/L (ref 136–145)
WBC # BLD AUTO: 8.2 K/UL (ref 4–11)

## 2024-10-10 PROCEDURE — 6360000002 HC RX W HCPCS: Performed by: GENERAL ACUTE CARE HOSPITAL

## 2024-10-10 PROCEDURE — 71045 X-RAY EXAM CHEST 1 VIEW: CPT

## 2024-10-10 PROCEDURE — 96361 HYDRATE IV INFUSION ADD-ON: CPT

## 2024-10-10 PROCEDURE — 6370000000 HC RX 637 (ALT 250 FOR IP): Performed by: INTERNAL MEDICINE

## 2024-10-10 PROCEDURE — 87070 CULTURE OTHR SPECIMN AEROBIC: CPT

## 2024-10-10 PROCEDURE — 2580000003 HC RX 258: Performed by: GENERAL ACUTE CARE HOSPITAL

## 2024-10-10 PROCEDURE — 80053 COMPREHEN METABOLIC PANEL: CPT

## 2024-10-10 PROCEDURE — 96375 TX/PRO/DX INJ NEW DRUG ADDON: CPT

## 2024-10-10 PROCEDURE — 99285 EMERGENCY DEPT VISIT HI MDM: CPT

## 2024-10-10 PROCEDURE — 1200000000 HC SEMI PRIVATE

## 2024-10-10 PROCEDURE — 87040 BLOOD CULTURE FOR BACTERIA: CPT

## 2024-10-10 PROCEDURE — 85025 COMPLETE CBC W/AUTO DIFF WBC: CPT

## 2024-10-10 PROCEDURE — 87205 SMEAR GRAM STAIN: CPT

## 2024-10-10 PROCEDURE — 6360000002 HC RX W HCPCS: Performed by: INTERNAL MEDICINE

## 2024-10-10 PROCEDURE — 87077 CULTURE AEROBIC IDENTIFY: CPT

## 2024-10-10 PROCEDURE — 96365 THER/PROPH/DIAG IV INF INIT: CPT

## 2024-10-10 PROCEDURE — 96372 THER/PROPH/DIAG INJ SC/IM: CPT

## 2024-10-10 PROCEDURE — 86140 C-REACTIVE PROTEIN: CPT

## 2024-10-10 PROCEDURE — 73630 X-RAY EXAM OF FOOT: CPT

## 2024-10-10 PROCEDURE — 2580000003 HC RX 258: Performed by: INTERNAL MEDICINE

## 2024-10-10 PROCEDURE — 83605 ASSAY OF LACTIC ACID: CPT

## 2024-10-10 PROCEDURE — 94761 N-INVAS EAR/PLS OXIMETRY MLT: CPT

## 2024-10-10 PROCEDURE — 36415 COLL VENOUS BLD VENIPUNCTURE: CPT

## 2024-10-10 PROCEDURE — 85652 RBC SED RATE AUTOMATED: CPT

## 2024-10-10 RX ORDER — TADALAFIL 5 MG/1
5 TABLET ORAL DAILY
Status: ON HOLD | COMMUNITY

## 2024-10-10 RX ORDER — PANTOPRAZOLE SODIUM 40 MG/1
40 TABLET, DELAYED RELEASE ORAL
Status: DISCONTINUED | OUTPATIENT
Start: 2024-10-10 | End: 2024-10-19 | Stop reason: HOSPADM

## 2024-10-10 RX ORDER — ACETAMINOPHEN 325 MG/1
650 TABLET ORAL EVERY 6 HOURS PRN
Status: DISCONTINUED | OUTPATIENT
Start: 2024-10-10 | End: 2024-10-19 | Stop reason: HOSPADM

## 2024-10-10 RX ORDER — LISINOPRIL 20 MG/1
20 TABLET ORAL DAILY
Status: ON HOLD | COMMUNITY

## 2024-10-10 RX ORDER — 0.9 % SODIUM CHLORIDE 0.9 %
1000 INTRAVENOUS SOLUTION INTRAVENOUS ONCE
Status: COMPLETED | OUTPATIENT
Start: 2024-10-10 | End: 2024-10-10

## 2024-10-10 RX ORDER — ONDANSETRON 2 MG/ML
4 INJECTION INTRAMUSCULAR; INTRAVENOUS ONCE
Status: COMPLETED | OUTPATIENT
Start: 2024-10-10 | End: 2024-10-10

## 2024-10-10 RX ORDER — ONDANSETRON 2 MG/ML
4 INJECTION INTRAMUSCULAR; INTRAVENOUS EVERY 6 HOURS PRN
Status: DISCONTINUED | OUTPATIENT
Start: 2024-10-10 | End: 2024-10-19 | Stop reason: HOSPADM

## 2024-10-10 RX ORDER — CLONAZEPAM 1 MG/1
1 TABLET ORAL NIGHTLY
Status: DISCONTINUED | OUTPATIENT
Start: 2024-10-10 | End: 2024-10-19 | Stop reason: HOSPADM

## 2024-10-10 RX ORDER — ROSUVASTATIN CALCIUM 10 MG/1
10 TABLET, COATED ORAL NIGHTLY
Status: DISCONTINUED | OUTPATIENT
Start: 2024-10-10 | End: 2024-10-19 | Stop reason: HOSPADM

## 2024-10-10 RX ORDER — POTASSIUM CHLORIDE 1500 MG/1
40 TABLET, EXTENDED RELEASE ORAL PRN
Status: DISCONTINUED | OUTPATIENT
Start: 2024-10-10 | End: 2024-10-19 | Stop reason: HOSPADM

## 2024-10-10 RX ORDER — ONDANSETRON 4 MG/1
4 TABLET, ORALLY DISINTEGRATING ORAL EVERY 8 HOURS PRN
Status: DISCONTINUED | OUTPATIENT
Start: 2024-10-10 | End: 2024-10-19 | Stop reason: HOSPADM

## 2024-10-10 RX ORDER — AMLODIPINE BESYLATE 10 MG/1
10 TABLET ORAL DAILY
Status: DISCONTINUED | OUTPATIENT
Start: 2024-10-10 | End: 2024-10-11

## 2024-10-10 RX ORDER — MAGNESIUM SULFATE IN WATER 40 MG/ML
2000 INJECTION, SOLUTION INTRAVENOUS PRN
Status: DISCONTINUED | OUTPATIENT
Start: 2024-10-10 | End: 2024-10-19 | Stop reason: HOSPADM

## 2024-10-10 RX ORDER — SODIUM CHLORIDE 9 MG/ML
INJECTION, SOLUTION INTRAVENOUS PRN
Status: DISCONTINUED | OUTPATIENT
Start: 2024-10-10 | End: 2024-10-19 | Stop reason: HOSPADM

## 2024-10-10 RX ORDER — PROPRANOLOL HYDROCHLORIDE 40 MG/1
40 TABLET ORAL 2 TIMES DAILY
Status: ON HOLD | COMMUNITY
Start: 2024-09-09

## 2024-10-10 RX ORDER — LISINOPRIL 10 MG/1
20 TABLET ORAL DAILY
Status: DISCONTINUED | OUTPATIENT
Start: 2024-10-10 | End: 2024-10-11

## 2024-10-10 RX ORDER — SODIUM CHLORIDE 0.9 % (FLUSH) 0.9 %
5-40 SYRINGE (ML) INJECTION PRN
Status: DISCONTINUED | OUTPATIENT
Start: 2024-10-10 | End: 2024-10-19 | Stop reason: HOSPADM

## 2024-10-10 RX ORDER — POTASSIUM CHLORIDE 7.45 MG/ML
10 INJECTION INTRAVENOUS PRN
Status: DISCONTINUED | OUTPATIENT
Start: 2024-10-10 | End: 2024-10-19 | Stop reason: HOSPADM

## 2024-10-10 RX ORDER — DEXTROSE MONOHYDRATE 100 MG/ML
INJECTION, SOLUTION INTRAVENOUS CONTINUOUS PRN
Status: DISCONTINUED | OUTPATIENT
Start: 2024-10-10 | End: 2024-10-19 | Stop reason: HOSPADM

## 2024-10-10 RX ORDER — TRAZODONE HYDROCHLORIDE 100 MG/1
200 TABLET ORAL NIGHTLY
Status: DISCONTINUED | OUTPATIENT
Start: 2024-10-10 | End: 2024-10-19 | Stop reason: HOSPADM

## 2024-10-10 RX ORDER — VANCOMYCIN 2 G/400ML
2000 INJECTION, SOLUTION INTRAVENOUS ONCE
Status: COMPLETED | OUTPATIENT
Start: 2024-10-10 | End: 2024-10-10

## 2024-10-10 RX ORDER — SODIUM CHLORIDE 0.9 % (FLUSH) 0.9 %
5-40 SYRINGE (ML) INJECTION EVERY 12 HOURS SCHEDULED
Status: DISCONTINUED | OUTPATIENT
Start: 2024-10-10 | End: 2024-10-19 | Stop reason: HOSPADM

## 2024-10-10 RX ORDER — INSULIN LISPRO 100 [IU]/ML
0-16 INJECTION, SOLUTION INTRAVENOUS; SUBCUTANEOUS
Status: DISCONTINUED | OUTPATIENT
Start: 2024-10-10 | End: 2024-10-19 | Stop reason: HOSPADM

## 2024-10-10 RX ORDER — ENOXAPARIN SODIUM 100 MG/ML
30 INJECTION SUBCUTANEOUS 2 TIMES DAILY
Status: DISCONTINUED | OUTPATIENT
Start: 2024-10-10 | End: 2024-10-16

## 2024-10-10 RX ORDER — PROPRANOLOL HYDROCHLORIDE 40 MG/1
40 TABLET ORAL 2 TIMES DAILY
Status: DISCONTINUED | OUTPATIENT
Start: 2024-10-10 | End: 2024-10-19 | Stop reason: HOSPADM

## 2024-10-10 RX ORDER — TRAZODONE HYDROCHLORIDE 100 MG/1
200 TABLET ORAL NIGHTLY
Status: ON HOLD | COMMUNITY
Start: 2024-09-09

## 2024-10-10 RX ORDER — MORPHINE SULFATE 4 MG/ML
4 INJECTION, SOLUTION INTRAMUSCULAR; INTRAVENOUS ONCE
Status: COMPLETED | OUTPATIENT
Start: 2024-10-10 | End: 2024-10-10

## 2024-10-10 RX ORDER — GLUCAGON 1 MG/ML
1 KIT INJECTION PRN
Status: DISCONTINUED | OUTPATIENT
Start: 2024-10-10 | End: 2024-10-19 | Stop reason: HOSPADM

## 2024-10-10 RX ORDER — ACETAMINOPHEN 650 MG/1
650 SUPPOSITORY RECTAL EVERY 6 HOURS PRN
Status: DISCONTINUED | OUTPATIENT
Start: 2024-10-10 | End: 2024-10-19 | Stop reason: HOSPADM

## 2024-10-10 RX ORDER — ACETAMINOPHEN 160 MG
2000 TABLET,DISINTEGRATING ORAL DAILY
Status: ON HOLD | COMMUNITY

## 2024-10-10 RX ORDER — HYDRALAZINE HYDROCHLORIDE 20 MG/ML
5 INJECTION INTRAMUSCULAR; INTRAVENOUS ONCE
Status: COMPLETED | OUTPATIENT
Start: 2024-10-10 | End: 2024-10-10

## 2024-10-10 RX ORDER — POLYETHYLENE GLYCOL 3350 17 G/17G
17 POWDER, FOR SOLUTION ORAL DAILY PRN
Status: DISCONTINUED | OUTPATIENT
Start: 2024-10-10 | End: 2024-10-19 | Stop reason: HOSPADM

## 2024-10-10 RX ADMIN — PROPRANOLOL HYDROCHLORIDE 40 MG: 40 TABLET ORAL at 21:18

## 2024-10-10 RX ADMIN — AMLODIPINE BESYLATE 10 MG: 10 TABLET ORAL at 18:23

## 2024-10-10 RX ADMIN — MORPHINE SULFATE 4 MG: 4 INJECTION, SOLUTION INTRAMUSCULAR; INTRAVENOUS at 11:32

## 2024-10-10 RX ADMIN — SODIUM CHLORIDE 1000 ML: 9 INJECTION, SOLUTION INTRAVENOUS at 11:29

## 2024-10-10 RX ADMIN — CLONAZEPAM 1 MG: 0.5 TABLET ORAL at 21:18

## 2024-10-10 RX ADMIN — SODIUM CHLORIDE, PRESERVATIVE FREE 10 ML: 5 INJECTION INTRAVENOUS at 21:19

## 2024-10-10 RX ADMIN — HYDRALAZINE HYDROCHLORIDE 5 MG: 20 INJECTION INTRAMUSCULAR; INTRAVENOUS at 14:24

## 2024-10-10 RX ADMIN — CEFEPIME 2000 MG: 2 INJECTION, POWDER, FOR SOLUTION INTRAVENOUS at 11:32

## 2024-10-10 RX ADMIN — LISINOPRIL 20 MG: 10 TABLET ORAL at 16:00

## 2024-10-10 RX ADMIN — TRAZODONE HYDROCHLORIDE 200 MG: 50 TABLET ORAL at 21:18

## 2024-10-10 RX ADMIN — ONDANSETRON 4 MG: 2 INJECTION INTRAMUSCULAR; INTRAVENOUS at 11:32

## 2024-10-10 RX ADMIN — VANCOMYCIN 2000 MG: 2 INJECTION, SOLUTION INTRAVENOUS at 13:16

## 2024-10-10 RX ADMIN — ROSUVASTATIN CALCIUM 10 MG: 10 TABLET, FILM COATED ORAL at 21:18

## 2024-10-10 RX ADMIN — CEFEPIME 2000 MG: 2 INJECTION, POWDER, FOR SOLUTION INTRAVENOUS at 23:20

## 2024-10-10 RX ADMIN — PANTOPRAZOLE SODIUM 40 MG: 40 TABLET, DELAYED RELEASE ORAL at 16:00

## 2024-10-10 RX ADMIN — ENOXAPARIN SODIUM 30 MG: 100 INJECTION SUBCUTANEOUS at 21:20

## 2024-10-10 ASSESSMENT — PAIN DESCRIPTION - ONSET: ONSET: ON-GOING

## 2024-10-10 ASSESSMENT — PAIN DESCRIPTION - DESCRIPTORS
DESCRIPTORS: THROBBING
DESCRIPTORS: ACHING

## 2024-10-10 ASSESSMENT — PAIN DESCRIPTION - ORIENTATION
ORIENTATION: LEFT

## 2024-10-10 ASSESSMENT — PAIN SCALES - GENERAL
PAINLEVEL_OUTOF10: 8
PAINLEVEL_OUTOF10: 7
PAINLEVEL_OUTOF10: 4

## 2024-10-10 ASSESSMENT — PAIN DESCRIPTION - LOCATION
LOCATION: TOE (COMMENT WHICH ONE)

## 2024-10-10 ASSESSMENT — PAIN - FUNCTIONAL ASSESSMENT
PAIN_FUNCTIONAL_ASSESSMENT: ACTIVITIES ARE NOT PREVENTED
PAIN_FUNCTIONAL_ASSESSMENT: PREVENTS OR INTERFERES SOME ACTIVE ACTIVITIES AND ADLS
PAIN_FUNCTIONAL_ASSESSMENT: 0-10

## 2024-10-10 ASSESSMENT — PAIN DESCRIPTION - FREQUENCY: FREQUENCY: CONTINUOUS

## 2024-10-10 NOTE — ED PROVIDER NOTES
**ADVANCED PRACTICE PROVIDER, I HAVE EVALUATED THIS PATIENT**        [facility] EMERGENCY DEPARTMENT  EMERGENCY DEPARTMENT ENCOUNTER      Pt Name: Tony Boyle  MRN:[id number]  Birthdate 1955  Date of evaluation: 10/10/2024  Provider: CLARENCE Anthony CNP  Note Started: 10:58 AM EDT 10/10/24        Chief Complaint:    Chief Complaint   Patient presents with    Toe Pain     Pt has wound to great left toe.  Pt sees wound care for same.  Pt here for concern of cellulitis.  Denies fevers.  Presently on antibiotics for same         Nursing Notes, Past Medical Hx, Past Surgical Hx, Social Hx, Allergies, and Family Hx were all reviewed and agreed with or any disagreements were addressed in the HPI.    HPI: (Location, Duration, Timing, Severity, Quality, Assoc Sx, Context, Modifying factors)    History From: Patient  Limitations to history : None    Social Determinants Significantly Affecting Health : None    Chief Complaint of toe pain    This is a  68 y.o. male with a past medical history of diabetes who presents increasing toe pain and erythema. On 9/29 the patient presented to [facility] ED for toe pain following his podiatrist removing a blister two days prior. ED did x-rays he was diagnosed with blister and cellulitis of left great toe.  Patient was discharged on Keflex 500 for 7 days and bacitracin ointment. Pt podiatrist switched patient to Augmentin which he completed the full course of. On 10/7/24 the patient saw wound care who noted drainage and removed some black eschar. He returned for follow up with wound care on 10/8/24 who noted they saw a decrease in erythema since starting Augmentin.Today he has increased pain and red streaking up his leg to his ankle. He called wound care about his new symptoms, and he was counseled to come to the ER.  He reports pain level of 7 out of 10, describes his pain as constant, dull, and throbbing.  Pain radiates into his lower leg.  Pain is worse with  tablet by mouth nightly    SEMAGLUTIDE, 1 MG/DOSE, 2 MG/1.5ML SOPN    Inject 1 mg into the skin once a week    TADALAFIL (CIALIS) 5 MG TABLET    Take 1 tablet by mouth daily    TRAZODONE (DESYREL) 100 MG TABLET    Take 2 tablets by mouth nightly    VITAMIN D (VITAMIN D3) 50 MCG (2000 UT) CAPS CAPSULE    Take 1 capsule by mouth daily       Review of Systems:  (1 systems needed)  Review of Systems   Constitutional:  Positive for appetite change, chills and fatigue. Negative for fever.   HENT:  Negative for congestion, sore throat and voice change.    Eyes:  Negative for visual disturbance.   Respiratory:  Negative for cough, chest tightness, shortness of breath and wheezing.    Cardiovascular:  Negative for chest pain and palpitations.   Gastrointestinal:  Negative for abdominal pain, nausea and vomiting.   Endocrine: Negative for polydipsia and polyuria.   Genitourinary:  Negative for difficulty urinating, dysuria and flank pain.   Musculoskeletal:  Positive for gait problem and joint swelling. Negative for back pain, neck pain and neck stiffness.   Skin:  Positive for color change and wound. Negative for rash.   Allergic/Immunologic: Negative for immunocompromised state.   Neurological:  Negative for dizziness, weakness, light-headedness and headaches.   Hematological:  Does not bruise/bleed easily.   Psychiatric/Behavioral:  Negative for sleep disturbance and suicidal ideas.        \"Positives and Pertinent negatives as per HPI\"    Physical Exam:  Physical Exam  Vitals and nursing note reviewed.   Constitutional:       Comments: Appears chronically ill   HENT:      Head: Normocephalic and atraumatic.      Right Ear: External ear normal.      Left Ear: External ear normal.      Nose: Nose normal.      Mouth/Throat:      Pharynx: Oropharynx is clear.   Eyes:      General:         Right eye: No discharge.         Left eye: No discharge.      Extraocular Movements: Extraocular movements intact.   Cardiovascular:

## 2024-10-10 NOTE — TELEPHONE ENCOUNTER
Patient called with c/o increase pain and red streaking up his leg. Spoke with Dr. Bruno and he would like patient to go to the hospital for further evaluation. Called patient back and he will head to the ER.

## 2024-10-10 NOTE — ED NOTES
ED SBAR report provider to TELLO Lainez. Patient to be transported to Room 4131 via stretcher by transport tech.Patient transported with bedside cardiac monitor and with IV medications infusing. IV site clean, dry, and intact. MEWS score and pain assessed as 4/10 and documented. Updated patient on plan of care.

## 2024-10-10 NOTE — PROGRESS NOTES
Pt admitted into room 4131. Pt is axox4 and denies any discomfort. Dressing placed on left foot (see MAR). Pt educated to room and call light system. Call light and belongings left within reach.

## 2024-10-10 NOTE — PROGRESS NOTES
4 Eyes Skin Assessment     NAME:  Tony Boyle  YOB: 1955  MEDICAL RECORD NUMBER:  [id number]    The patient is being assessed for  Admission    I agree that at least one RN has performed a thorough Head to Toe Skin Assessment on the patient. ALL assessment sites listed below have been assessed.      Areas assessed by both nurses:    Head, Face, Ears, Shoulders, Back, Chest, Arms, Elbows, Hands, Sacrum. Buttock, Coccyx, Ischium, Legs. Feet and Heels, and Under Medical Devices         Does the Patient have a Wound? Yes wound(s) were present on assessment. LDA wound assessment was Initiated and completed by RN       Juaquin Prevention initiated by RN: Yes  Wound Care Orders initiated by RN: No     Pressure Injury (Stage 3,4, Unstageable, DTI, NWPT, and Complex wounds) if present, place Wound referral order by RN under : No    New Ostomies, if present place, Ostomy referral order under : No     Nurse 1 eSignature: Electronically signed by Fatou Mayfield RN on 10/10/24 at 6:08 PM EDT    **SHARE this note so that the co-signing nurse can place an eSignature**    Nurse 2 eSignature: {Esignature:[id number]}

## 2024-10-10 NOTE — H&P
V2.0  History and Physical      Name:  Tony Boyle /Age/Sex: 1955  (68 y.o. male)   MRN & CSN:  [id number] & [id number] Encounter Date/Time: 10/10/2024 2:52 PM EDT   Location:  [address] [place] [address] PCP: Hebert Bolivar MD       Hospital Day: 1    Assessment and Plan:   Tony Boyle is a 68 y.o. male with a pmh of 2 diabetes, hypertension, CAD, peripheral neuropathy presenting with left foot great toe wound and infection.  Developed a blister on his left great toe which popped and then became an open wound, developed redness, swelling.  His redness worsen up to the ankle, swelling increased.  Patient tried outpatient antibiotics with no significant improvement.  Presented to emergency room for further evaluation.  Patient denies fevers.  Denies cough pain.      Hospital Problems             Last Modified POA    * (Principal) Left foot infection 10/10/2024 Yes       Plan:  Left foot diabetic infection, MRI for further evaluation of the extent of the infection  -Blood cultures ordered, consult podiatry, vancomycin and cefepime empirically for now  -Outpatient wound cultures from 10/8 positive for enterobacteria and staph epi, will follow-up with the susceptibilities    Hypertension, resume Coreg, lisinopril, amlodipine, Cardizem    Type 2 diabetes, resume home meds    Anxiety continue home medications    Patient requires close monitoring of for toxicity while on vancomycin and cefepime    Disposition:   Current Living situation: From home  Expected Disposition: To home  Estimated D/C: To be determined    Diet ADULT DIET; Regular; 4 carb choices (60 gm/meal)   DVT Prophylaxis [x] Lovenox, []  Heparin, [] SCDs, [] Ambulation,  [] Eliquis, [] Xarelto, [] Coumadin   Code Status Full Code   Surrogate Decision Maker/ POA      Personally reviewed Lab Studies and Imaging       History from:     patient    History of Present Illness:     Chief Complaint: Left foot infection       Review of Systems:        Pertinent    Physical Exam     General: NAD  Obese  Eyes: EOMI  ENT: neck supple  Cardiovascular: Regular rate.  Respiratory: Clear to auscultation  Gastrointestinal: Soft, non tender  Genitourinary: no suprapubic tenderness  Musculoskeletal: Left foot great toe swollen, with open wound, has erythema, erythema spreading up to ankle  Skin: As above  Neuro: Alert.  Psych: Mood appropriate.       Past Medical History:   PMHx   Past Medical History:   Diagnosis Date    Depression     Diabetes mellitus (HCC)     GERD (gastroesophageal reflux disease)     Hyperlipidemia     Hypertension     LBBB (left bundle branch block)     SVT (supraventricular tachycardia) ([facility])      PSHX:  has a past surgical history that includes Tonsillectomy; knee surgery (Right); Pacemaker insertion; Eye surgery (Right, 05/01/2023); Insertable Cardiac Monitor; Eye surgery (Left, 05/15/2023); Colonoscopy; Endoscopy, colon, diagnostic; Upper gastrointestinal endoscopy (N/A, 3/1/2024); and Colonoscopy (N/A, 3/1/2024).  Allergies: No Known Allergies  Fam HX:  family history includes Diabetes in his father; Heart Disease in his mother; Other in his mother.  Soc HX:   Social History     Socioeconomic History    Marital status: Single   Tobacco Use    Smoking status: Former     Types: Cigarettes    Smokeless tobacco: Never    Tobacco comments:     Quit age 16 smoked for 2 years    Vaping Use    Vaping status: Never Used   Substance and Sexual Activity    Alcohol use: Yes     Comment: social    Drug use: No     Social Determinants of Health      [facility]    Food Insecurities    Received from [facility], [facility]    Transportation    Received from [facility], [facility]    Interpersonal Safety    Received from [facility],

## 2024-10-10 NOTE — ED NOTES
Pharmacy Medication Reconciliation Note     List of medications patient is currently taking is complete.    Source of information:   EMR  patient    Notes regarding home medications:   Reports only taking his insulin this morning  Finished Augmentin. Was also on a course of cephalexin recently       Anastacio Livingston PharmD  10/10/2024  1:25 PM

## 2024-10-10 NOTE — CONSULTS
Clinical Pharmacy Note  Vancomycin Consult    Pharmacy consult received for one-time dose of vancomycin in the Emergency Department.    Ht Readings from Last 1 Encounters:   10/07/24 1.854 m (6' 1\")        Wt Readings from Last 1 Encounters:   10/10/24 122.2 kg (269 lb 6.4 oz)         Assessment/Plan:  Vancomycin 2000 mg x 1 in ED.  If vancomycin is to continue on admission and pharmacy is to manage dosing, please re-consult with admission orders.

## 2024-10-10 NOTE — ED NOTES
Per MRI staff patient is requiring a chest xray before completion of foot MRI. Perfect serv placed, N.O for xray at this time. Pacemaker make and model given to MRI.

## 2024-10-11 LAB
ANION GAP SERPL CALCULATED.3IONS-SCNC: 8 MMOL/L (ref 3–16)
BASOPHILS # BLD: 0 K/UL (ref 0–0.2)
BASOPHILS NFR BLD: 0.3 %
BUN SERPL-MCNC: 12 MG/DL (ref 7–20)
CALCIUM SERPL-MCNC: 8.7 MG/DL (ref 8.3–10.6)
CHLORIDE SERPL-SCNC: 106 MMOL/L (ref 99–110)
CO2 SERPL-SCNC: 25 MMOL/L (ref 21–32)
CREAT SERPL-MCNC: 0.7 MG/DL (ref 0.8–1.3)
DEPRECATED RDW RBC AUTO: 15.4 % (ref 12.4–15.4)
EOSINOPHIL # BLD: 0.1 K/UL (ref 0–0.6)
EOSINOPHIL NFR BLD: 0.7 %
GFR SERPLBLD CREATININE-BSD FMLA CKD-EPI: >90 ML/MIN/{1.73_M2}
GLUCOSE BLD-MCNC: 146 MG/DL (ref 70–99)
GLUCOSE BLD-MCNC: 161 MG/DL (ref 70–99)
GLUCOSE BLD-MCNC: 195 MG/DL (ref 70–99)
GLUCOSE BLD-MCNC: 65 MG/DL (ref 70–99)
GLUCOSE BLD-MCNC: 89 MG/DL (ref 70–99)
GLUCOSE SERPL-MCNC: 80 MG/DL (ref 70–99)
HCT VFR BLD AUTO: 38.6 % (ref 40.5–52.5)
HGB BLD-MCNC: 13.3 G/DL (ref 13.5–17.5)
LYMPHOCYTES # BLD: 3 K/UL (ref 1–5.1)
LYMPHOCYTES NFR BLD: 39.2 %
MCH RBC QN AUTO: 30.5 PG (ref 26–34)
MCHC RBC AUTO-ENTMCNC: 34.5 G/DL (ref 31–36)
MCV RBC AUTO: 88.5 FL (ref 80–100)
MONOCYTES # BLD: 0.7 K/UL (ref 0–1.3)
MONOCYTES NFR BLD: 9.4 %
NEUTROPHILS # BLD: 3.8 K/UL (ref 1.7–7.7)
NEUTROPHILS NFR BLD: 50.4 %
PERFORMED ON: ABNORMAL
PERFORMED ON: NORMAL
PLATELET # BLD AUTO: 212 K/UL (ref 135–450)
PMV BLD AUTO: 8.3 FL (ref 5–10.5)
POTASSIUM SERPL-SCNC: 3.7 MMOL/L (ref 3.5–5.1)
RBC # BLD AUTO: 4.37 M/UL (ref 4.2–5.9)
SODIUM SERPL-SCNC: 139 MMOL/L (ref 136–145)
WBC # BLD AUTO: 7.6 K/UL (ref 4–11)

## 2024-10-11 PROCEDURE — 6370000000 HC RX 637 (ALT 250 FOR IP): Performed by: PODIATRIST

## 2024-10-11 PROCEDURE — 2580000003 HC RX 258: Performed by: GENERAL ACUTE CARE HOSPITAL

## 2024-10-11 PROCEDURE — 80048 BASIC METABOLIC PNL TOTAL CA: CPT

## 2024-10-11 PROCEDURE — 2580000003 HC RX 258: Performed by: INTERNAL MEDICINE

## 2024-10-11 PROCEDURE — 85025 COMPLETE CBC W/AUTO DIFF WBC: CPT

## 2024-10-11 PROCEDURE — 6370000000 HC RX 637 (ALT 250 FOR IP): Performed by: INTERNAL MEDICINE

## 2024-10-11 PROCEDURE — 6360000002 HC RX W HCPCS: Performed by: INTERNAL MEDICINE

## 2024-10-11 PROCEDURE — 36415 COLL VENOUS BLD VENIPUNCTURE: CPT

## 2024-10-11 PROCEDURE — 1200000000 HC SEMI PRIVATE

## 2024-10-11 PROCEDURE — 94760 N-INVAS EAR/PLS OXIMETRY 1: CPT

## 2024-10-11 PROCEDURE — 6360000002 HC RX W HCPCS: Performed by: GENERAL ACUTE CARE HOSPITAL

## 2024-10-11 RX ORDER — LISINOPRIL 40 MG/1
40 TABLET ORAL DAILY
Status: DISCONTINUED | OUTPATIENT
Start: 2024-10-12 | End: 2024-10-11

## 2024-10-11 RX ORDER — VANCOMYCIN 1.75 G/350ML
1250 INJECTION, SOLUTION INTRAVENOUS EVERY 12 HOURS
Status: DISCONTINUED | OUTPATIENT
Start: 2024-10-11 | End: 2024-10-12 | Stop reason: DRUGHIGH

## 2024-10-11 RX ORDER — LISINOPRIL 20 MG/1
20 TABLET ORAL DAILY
Status: DISCONTINUED | OUTPATIENT
Start: 2024-10-12 | End: 2024-10-14

## 2024-10-11 RX ORDER — LISINOPRIL 20 MG/1
20 TABLET ORAL ONCE
Status: COMPLETED | OUTPATIENT
Start: 2024-10-11 | End: 2024-10-11

## 2024-10-11 RX ADMIN — AMLODIPINE BESYLATE 10 MG: 10 TABLET ORAL at 08:47

## 2024-10-11 RX ADMIN — VANCOMYCIN 1250 MG: 1.75 INJECTION, SOLUTION INTRAVENOUS at 21:27

## 2024-10-11 RX ADMIN — INSULIN LISPRO 4 UNITS: 100 INJECTION, SOLUTION INTRAVENOUS; SUBCUTANEOUS at 21:28

## 2024-10-11 RX ADMIN — TRAZODONE HYDROCHLORIDE 200 MG: 50 TABLET ORAL at 20:57

## 2024-10-11 RX ADMIN — PROPRANOLOL HYDROCHLORIDE 40 MG: 40 TABLET ORAL at 20:56

## 2024-10-11 RX ADMIN — PANTOPRAZOLE SODIUM 40 MG: 40 TABLET, DELAYED RELEASE ORAL at 05:30

## 2024-10-11 RX ADMIN — CLONAZEPAM 1 MG: 0.5 TABLET ORAL at 20:58

## 2024-10-11 RX ADMIN — VANCOMYCIN 1250 MG: 1.75 INJECTION, SOLUTION INTRAVENOUS at 11:24

## 2024-10-11 RX ADMIN — ENOXAPARIN SODIUM 30 MG: 100 INJECTION SUBCUTANEOUS at 08:48

## 2024-10-11 RX ADMIN — LISINOPRIL 20 MG: 10 TABLET ORAL at 08:47

## 2024-10-11 RX ADMIN — CEFEPIME 2000 MG: 2 INJECTION, POWDER, FOR SOLUTION INTRAVENOUS at 13:50

## 2024-10-11 RX ADMIN — SODIUM CHLORIDE, PRESERVATIVE FREE 10 ML: 5 INJECTION INTRAVENOUS at 08:48

## 2024-10-11 RX ADMIN — CEFEPIME 2000 MG: 2 INJECTION, POWDER, FOR SOLUTION INTRAVENOUS at 23:05

## 2024-10-11 RX ADMIN — Medication: at 20:58

## 2024-10-11 RX ADMIN — LISINOPRIL 20 MG: 20 TABLET ORAL at 13:50

## 2024-10-11 RX ADMIN — ROSUVASTATIN CALCIUM 10 MG: 10 TABLET, FILM COATED ORAL at 20:56

## 2024-10-11 RX ADMIN — SODIUM CHLORIDE, PRESERVATIVE FREE 10 ML: 5 INJECTION INTRAVENOUS at 20:57

## 2024-10-11 RX ADMIN — ENOXAPARIN SODIUM 30 MG: 100 INJECTION SUBCUTANEOUS at 20:59

## 2024-10-11 RX ADMIN — PROPRANOLOL HYDROCHLORIDE 40 MG: 40 TABLET ORAL at 08:47

## 2024-10-11 NOTE — CARE COORDINATION
Case Management Assessment  Initial Evaluation    Date/Time of Evaluation: 10/11/2024 3:36 PM  Assessment Completed by: SIRENA Barahona    If patient is discharged prior to next notation, then this note serves as note for discharge by case management.    Patient Name: Tony Boyle                   YOB: 1955  Diagnosis: Cellulitis of foot [L03.119]  Goals of care, counseling/discussion [Z71.89]  Left foot infection [L08.9]  Diabetic ulcer of toe of left foot associated with diabetes mellitus of other type, unspecified ulcer stage (HCC) [E13.621, L97.529]                   Date / Time: 10/10/2024 10:36 AM    Patient Admission Status: Inpatient   Readmission Risk (Low < 19, Mod (19-27), High > 27): Readmission Risk Score: 7.8    Current PCP: Hebert Bolivar MD  PCP verified by CM? Yes    Chart Reviewed: Yes      History Provided by: Patient  Patient Orientation: Alert and Oriented    Patient Cognition: Alert    Hospitalization in the last 30 days (Readmission):  No    If yes, Readmission Assessment in CM Navigator will be completed.    Advance Directives:      Code Status: Full Code   Patient's Primary Decision Maker is: Legal Next of Kin      Discharge Planning:    Patient lives with: Children (Lives with daughter Sylvie) Type of Home: House  Primary Care Giver: Self  Patient Support Systems include: Children   Current Financial resources: Medicare  Current community resources:    Current services prior to admission: Durable Medical Equipment            Current DME: Walker (scooter and stairlift)            Type of Home Care services:  None    ADLS  Prior functional level: Independent in ADLs/IADLs  Current functional level: Independent in ADLs/IADLs    PT AM-PAC:   /24  OT AM-PAC:   /24    Family can provide assistance at DC: Yes  Would you like Case Management to discuss the discharge plan with any other family members/significant others, and if so, who? No  Plans to Return to Present

## 2024-10-11 NOTE — CONSULTS
Department of Podiatry Consult Note  Jimbo Bruno DPM Attending       Reason for Consult:  LEFT medial hallux wound with extending cellulitis  Requesting Physician:  Magaly Schafer MD    CHIEF COMPLAINT:  Wound of LEFT Great Toe over last week, with complicating cellulitis, failed oral antibiotics    HISTORY OF PRESENT ILLNESS:                The patient is a 68 y.o. male with significant past medical history of HTN with CAD, Diabetes with peripheral neuropathy who is consulted for a week's history of wound with worsening erythema. Patient relates he was seen by Urgent Care and given oral antibiotics that initially improved his wound color as compared to previous demarcation to midfoot. When seen by Wound Care Center on Tuesday the erythema had receded to base of Great Toe from the marked midfoot, but by Wednesday the erythema returned and edema to his LEFT ankle had become of greater concern. Initially had Keflex, then given Augmentin, but had Coban wrap applied tightly to his wound. Patient presented to Emergency Dept for IV antibiotics and anticipated imaging    Past Medical History:        Diagnosis Date    Depression     Diabetes mellitus (HCC)     GERD (gastroesophageal reflux disease)     Hyperlipidemia     Hypertension     LBBB (left bundle branch block)     SVT (supraventricular tachycardia) (HCC)      Past Surgical History:        Procedure Laterality Date    COLONOSCOPY      COLONOSCOPY N/A 3/1/2024    COLONOSCOPY POLYPECTOMY SNARE/COLD BIOPSY performed by Ambrocio Coffey MD at [facility] ENDOSCOPY    ENDOSCOPY, COLON, DIAGNOSTIC      EYE SURGERY Right 05/01/2023    PHACOEMULSIFICATION WITH CLAREON PANOPTIX TORIC INTRAOCULAR LENS IMPLANT - RIGHT EYE performed by Luiz Mancuso MD at [facility]    EYE SURGERY Left 05/15/2023    PHACOEMULSIFICATION WITH CLAREON PANOPTIX TORIC INTRAOCULAR LENS IMPLANT - LEFT EYE performed by Luiz Mancuso MD at [facility]    INSERTABLE CARDIAC MONITOR      out    KNEE  signs of infection    NEUROLOGIC:    Pain sensation isdecreased Bilateral.  Light touch is decreasedBilateral. positive history of paresthesia Bilateral. negativehistory of burning Bilateral. Deep tendon reflexes are 2 to include patellar and achilles Bilateral. Braham--Brianne 5.07 monofilament sensitivity is abnormal 2 to 5 spots tested Bilateral.    VASCULAR:    left Dorsalis pedis is 2. left Posterior tibial pulse is 2. 1+ edema left. mild Varicosities bilaterally.      MUSCULOSKELETAL:  Amo is normal. Muscle strength is 5/5 to all groups tested to include dorsiflexion, plantarflexion, inversion, eversion, and digital Bilateral . The ranges of motion of all joints tested from ankle distal is decreased there is no crepitus noted Left.    Radiographs LEFT foot:  No evidence of osteomyelitis    MRI PENDING      IMPRESSION/RECOMMENDATIONS    1. Cellulitis of LEFT foot secondary to Diabetic Neuropathic wound  +Afebrile and absence of Leukocytosis  +Cefepime / Vanc    2. MUGS 2 ulcer of LEFT Great Toe with previous debridement performed, and will continue with Santyl daily dressing changes as further promotion of granulation    3. Diabetes with peripheral neuropathy    Continued wound care to include Santyl, and will await MRI for further advanced care planning    Thank you for the opportunity to take part in the patient's care.    Jimbo Bruno [facility]  [facility]  [phone]

## 2024-10-11 NOTE — PROGRESS NOTES
V2.0    [facility] Progress Note      Name:  Tony Boyle /Age/Sex: 1955  (68 y.o. male)   MRN & CSN:  [id number] & [id number] Encounter Date/Time: 10/11/2024 1:14 PM EDT   Location:  [id number] PCP: Hebert Bolivar MD     Attending:Magaly Schafer MD       Hospital Day: 2    Assessment and Recommendations   Tony Boyle is a 68 y.o. male with pmh of diabetes, hypertension, CAD, peripheral neuropathy presented to hospital with left foot great toe infection.      Plan:   Diabetic left foot ulcer with infection  -Left great toe wound, with significant signs of infection  -Continue empiric antibiotics while waiting for cultures.  -Outpatient wound cultures from 10/8 polymicrobial with Enterobacter cloacae, Streptococcus epidermidis and E. Coli  -Foot MRI ordered  -Podiatry, wound care on consult      Hypertension, multiple blood pressure medications listed in home medications.  I clarified with patient he only takes lisinopril 20 mg and propranolol for blood pressure.  He is not taking Cardizem, Coreg and amlodipine anymore.     Type 2 diabetes, continue sliding scale     Anxiety, continue home medications         Diet ADULT DIET; Regular; 4 carb choices (60 gm/meal)   DVT Prophylaxis [x] Lovenox, []  Heparin, [] SCDs, [] Ambulation,  [] Eliquis, [] Xarelto  [] Coumadin   Code Status Full Code   Disposition From: From home  Expected Disposition: To home  Estimated Date of Discharge: To be determined  Patient requires continued admission due to ongoing workup and treatment   Surrogate Decision Maker/ POA       Personally reviewed Lab Studies and Imaging     Subjective:     Chief Complaint: Left foot infection    Is afebrile, denies any chest pain or shortness of breath        Review of Systems:      Pertinent positives and negatives discussed in HPI    Objective:     Intake/Output Summary (Last 24 hours) at 10/11/2024 1314  Last data filed at 10/11/2024 0853  Gross per 24 hour   Intake 1440.15 ml

## 2024-10-11 NOTE — CONSULTS
Clinical Pharmacy Note  Vancomycin Consult    Tony Boyle is a 68 y.o. male ordered vancomycin for SSTI; consult received from Dr. Menchaca to manage therapy. Also receiving cefepime.    Allergies:  Patient has no known allergies.     Temp max:  Temp (24hrs), Av.3 °F (36.8 °C), Min:97.9 °F (36.6 °C), Max:98.8 °F (37.1 °C)      Recent Labs     10/10/24  1118 10/11/24  0501   WBC 8.2 7.6       Recent Labs     10/10/24  1118 10/11/24  0501   BUN 14 12   CREATININE 0.7* 0.7*         Intake/Output Summary (Last 24 hours) at 10/11/2024 0912  Last data filed at 10/11/2024 0853  Gross per 24 hour   Intake 1440.15 ml   Output --   Net 1440.15 ml       Culture Results:  Pending    Ht Readings from Last 1 Encounters:   10/10/24 1.854 m (6' 1\")        Wt Readings from Last 1 Encounters:   10/11/24 123.3 kg (271 lb 14.4 oz)         Estimated Creatinine Clearance: 135 mL/min (A) (based on SCr of 0.7 mg/dL (L)).    Assessment/Plan:  Day # 2 of vancomycin.  Vancomycin 1250 mg IV every 12 hours.    Goal -600  Predicted       Thank you for the consult.   Ana Moe [facility], PharmD, 10/11/2024 9:12 AM

## 2024-10-11 NOTE — PLAN OF CARE
Problem: Chronic Conditions and Co-morbidities  Goal: Patient's chronic conditions and co-morbidity symptoms are monitored and maintained or improved  10/10/2024 2248 by Adriane Campbell RN  Outcome: Progressing  10/10/2024 1830 by Fatou Cunningham RN  Outcome: Progressing     Problem: Discharge Planning  Goal: Discharge to home or other facility with appropriate resources  10/10/2024 2248 by Adriane Campbell RN  Outcome: Progressing  10/10/2024 1830 by Fatou Cunningham RN  Outcome: Progressing     Problem: Pain  Goal: Verbalizes/displays adequate comfort level or baseline comfort level  10/10/2024 2248 by Adriane Campbell RN  Outcome: Progressing  10/10/2024 1830 by Fatou Cunningham RN  Outcome: Progressing     Problem: Safety - Adult  Goal: Free from fall injury  10/10/2024 2248 by Adriane Campbell RN  Outcome: Progressing  10/10/2024 1830 by Fatou Cunningham RN  Outcome: Progressing     Problem: ABCDS Injury Assessment  Goal: Absence of physical injury  10/10/2024 2248 by Adriane Campbell RN  Outcome: Progressing  10/10/2024 1830 by Fatou Cunningham RN  Outcome: Progressing

## 2024-10-11 NOTE — CARE COORDINATION
Advance Care Planning   Healthcare Decision Maker:    Primary Decision Maker: JhonSylvie - Child - [phone]    Secondary Decision Maker: [place] - Child - [phone]    Today we documented Decision Maker(s) consistent with Legal Next of Kin hierarchy.       Electronically signed by SIRENA Barahona on 10/11/2024 at 4:05 PM

## 2024-10-11 NOTE — PROGRESS NOTES
Spiritual Health History and Assessment/Progress Note  [facility]    Spiritual/Emotional Needs,  , Adjustment to illness,      Name: Tony Boyle MRN: [id number]    Age: 68 y.o.     Sex: male   Language: English   [employer]yb[employer]ian: [employer]ism   Left foot infection     Date: 10/11/2024            Total Time Calculated: 30 min              Spiritual Assessment began in [facility]        Referral/Consult From: Nurse   Encounter Overview/Reason: Spiritual/Emotional Needs  Service Provided For: Patient and family together    Jessy, Belief, Meaning:   Patient is connected with a jessy tradition or spiritual practice and has beliefs or practices that help with coping during difficult times  Family/Friends are connected with a jessy tradition or spiritual practice and have beliefs or practices that help with coping during difficult times      Importance and Influence:  Patient has no beliefs influential to healthcare decision-making identified during this visit  Family/Friends have no beliefs influential to healthcare decision-making identified during this visit    Community:  Patient feels well-supported. Support system includes: Children  Family/Friends feel well-supported. Support system includes: Parent/s and Extended family    Assessment and Plan of Care:     Patient Interventions include: Facilitated expression of thoughts and feelings, Explored spiritual coping/struggle/distress, Affirmed coping skills/support systems, and Facilitated life review and/ or legacy  Family/Friends Interventions include: Facilitated expression of thoughts and feelings and Explored spiritual coping/struggle/distress    Patient Plan of Care: Spiritual Care available upon further referral  Family/Friends Plan of Care: Spiritual Care available upon further referral    Electronically signed by Chaplain ALFONSO on 10/11/2024 at 2:21 PM

## 2024-10-11 NOTE — CARE COORDINATION
Wound Referral Progress Note       NAME:  Tony Boyle  MEDICAL RECORD NUMBER:  [id number]  AGE: 68 y.o.   GENDER: male  : 1955  TODAY'S DATE:  10/11/2024    Subjective   Reason for WOCN Evaluation and Assessment: left toe wound      Tony Boyle is a 68 y.o. male referred by:   Provider and Nursing    Wound Identification:  Wound Type: diabetic  Contributing Factors: diabetes    Wound History: pt tells me toe wound started as a blood blister  Current Wound Care Treatment:  moist to moist    Patient Care Goal:  Wound Healing        PAST MEDICAL HISTORY        Diagnosis Date    Depression     Diabetes mellitus (HCC)     GERD (gastroesophageal reflux disease)     Hyperlipidemia     Hypertension     LBBB (left bundle branch block)     SVT (supraventricular tachycardia) (HCC)        PAST SURGICAL HISTORY    Past Surgical History:   Procedure Laterality Date    COLONOSCOPY      COLONOSCOPY N/A 3/1/2024    COLONOSCOPY POLYPECTOMY SNARE/COLD BIOPSY performed by Ambrocio Coffey MD at [facility] ENDOSCOPY    ENDOSCOPY, COLON, DIAGNOSTIC      EYE SURGERY Right 2023    PHACOEMULSIFICATION WITH CLAREON PANOPTIX TORIC INTRAOCULAR LENS IMPLANT - RIGHT EYE performed by Luiz Mancuso MD at [facility]    EYE SURGERY Left 05/15/2023    PHACOEMULSIFICATION WITH CLAREON PANOPTIX TORIC INTRAOCULAR LENS IMPLANT - LEFT EYE performed by Luiz Mancuso MD at [facility]    INSERTABLE CARDIAC MONITOR      out    KNEE SURGERY Right     meninscus repair    PACEMAKER INSERTION      had 8 seconds heart stop    TONSILLECTOMY      UPPER GASTROINTESTINAL ENDOSCOPY N/A 3/1/2024    ESOPHAGOGASTRODUODENOSCOPY DILATATION performed by Ambrocio Coffey MD at [facility] ENDOSCOPY       FAMILY HISTORY    Family History   Problem Relation Age of Onset    Heart Disease Mother     Other Mother         pulmonary emboli    Diabetes Father        SOCIAL HISTORY    Social History     Tobacco Use    Smoking status: Former     Types: Cigarettes     Follows at wound clinic with podiatry. Podiatry consult is pending. Pedal pulses palpable. Right foot and ankle edematous, with erythema. Great toe red with drainage. RN instructed to apply mosit to moist dressing until seen by podiatry. Left 2nd toe with dry callus and medial foot dry callused. Can apply skin prep to these areas. Pt think that is from stubbing his toe.     Response to treatment:  Well tolerated by patient.     Pain Assessment:  Severity:  0 / 10    Plan   Plan of Care:   -moist to moist  Specialty Bed Required :  No special surface required    Current Diet: ADULT DIET; Regular; 4 carb choices (60 gm/meal)  Dietician consult:  Yes    Discharge Plan:  Placement for patient upon discharge: Home with Support   Patient appropriate for Outpatient Wound Care Center: Yes    Referrals:      Patient/Caregiver Teaching:  Level of patient/caregiver understanding able to:  Verbal understanding      Electronically signed by RUBIN Schaeffer on 10/11/2024 at 1:07 PM

## 2024-10-12 PROBLEM — Z79.4 TYPE 2 DIABETES MELLITUS WITH DIABETIC NEUROPATHY, WITH LONG-TERM CURRENT USE OF INSULIN (HCC): Status: ACTIVE | Noted: 2024-10-12

## 2024-10-12 PROBLEM — E66.01 MORBID OBESITY DUE TO EXCESS CALORIES: Status: ACTIVE | Noted: 2024-10-12

## 2024-10-12 PROBLEM — L97.509 ULCER OF TOE DUE TO DIABETES MELLITUS (HCC): Status: ACTIVE | Noted: 2024-10-12

## 2024-10-12 PROBLEM — A49.8 E COLI INFECTION: Status: ACTIVE | Noted: 2024-10-12

## 2024-10-12 PROBLEM — E11.40 TYPE 2 DIABETES MELLITUS WITH DIABETIC NEUROPATHY, WITH LONG-TERM CURRENT USE OF INSULIN (HCC): Status: ACTIVE | Noted: 2024-10-12

## 2024-10-12 PROBLEM — Z95.0 PACEMAKER: Status: ACTIVE | Noted: 2024-10-12

## 2024-10-12 PROBLEM — L03.119 CELLULITIS OF FOOT: Status: ACTIVE | Noted: 2024-10-12

## 2024-10-12 PROBLEM — E11.621 ULCER OF TOE DUE TO DIABETES MELLITUS (HCC): Status: ACTIVE | Noted: 2024-10-12

## 2024-10-12 PROBLEM — Z16.24 MULTIPLE DRUG RESISTANT ORGANISM (MDRO) CULTURE POSITIVE: Status: ACTIVE | Noted: 2024-10-12

## 2024-10-12 LAB
BACTERIA SPEC AEROBE CULT: ABNORMAL
EST. AVERAGE GLUCOSE BLD GHB EST-MCNC: 237.4 MG/DL
GLUCOSE BLD-MCNC: 153 MG/DL (ref 70–99)
GLUCOSE BLD-MCNC: 156 MG/DL (ref 70–99)
GLUCOSE BLD-MCNC: 200 MG/DL (ref 70–99)
GLUCOSE BLD-MCNC: 81 MG/DL (ref 70–99)
GRAM STN SPEC: ABNORMAL
GRAM STN SPEC: ABNORMAL
HBA1C MFR BLD: 9.9 %
MAGNESIUM SERPL-MCNC: 1.79 MG/DL (ref 1.8–2.4)
ORGANISM: ABNORMAL
PERFORMED ON: ABNORMAL
PERFORMED ON: NORMAL
VANCOMYCIN SERPL-MCNC: 11.1 UG/ML

## 2024-10-12 PROCEDURE — 1200000000 HC SEMI PRIVATE

## 2024-10-12 PROCEDURE — 36415 COLL VENOUS BLD VENIPUNCTURE: CPT

## 2024-10-12 PROCEDURE — 80202 ASSAY OF VANCOMYCIN: CPT

## 2024-10-12 PROCEDURE — 83735 ASSAY OF MAGNESIUM: CPT

## 2024-10-12 PROCEDURE — 2580000003 HC RX 258: Performed by: INTERNAL MEDICINE

## 2024-10-12 PROCEDURE — 6360000002 HC RX W HCPCS: Performed by: INTERNAL MEDICINE

## 2024-10-12 PROCEDURE — 6370000000 HC RX 637 (ALT 250 FOR IP): Performed by: PODIATRIST

## 2024-10-12 PROCEDURE — 83036 HEMOGLOBIN GLYCOSYLATED A1C: CPT

## 2024-10-12 PROCEDURE — 6370000000 HC RX 637 (ALT 250 FOR IP): Performed by: INTERNAL MEDICINE

## 2024-10-12 PROCEDURE — 94760 N-INVAS EAR/PLS OXIMETRY 1: CPT

## 2024-10-12 PROCEDURE — 99223 1ST HOSP IP/OBS HIGH 75: CPT | Performed by: INTERNAL MEDICINE

## 2024-10-12 RX ORDER — NITROGLYCERIN 0.1MG/HR
1 PATCH, TRANSDERMAL 24 HOURS TRANSDERMAL DAILY
Status: DISCONTINUED | OUTPATIENT
Start: 2024-10-12 | End: 2024-10-17

## 2024-10-12 RX ADMIN — MEROPENEM 1000 MG: 1 INJECTION INTRAVENOUS at 10:58

## 2024-10-12 RX ADMIN — INSULIN LISPRO 4 UNITS: 100 INJECTION, SOLUTION INTRAVENOUS; SUBCUTANEOUS at 18:30

## 2024-10-12 RX ADMIN — ENOXAPARIN SODIUM 30 MG: 100 INJECTION SUBCUTANEOUS at 08:32

## 2024-10-12 RX ADMIN — ENOXAPARIN SODIUM 30 MG: 100 INJECTION SUBCUTANEOUS at 21:03

## 2024-10-12 RX ADMIN — VANCOMYCIN HYDROCHLORIDE 1500 MG: 1.5 INJECTION, POWDER, LYOPHILIZED, FOR SOLUTION INTRAVENOUS at 12:21

## 2024-10-12 RX ADMIN — VANCOMYCIN HYDROCHLORIDE 1500 MG: 1.5 INJECTION, POWDER, LYOPHILIZED, FOR SOLUTION INTRAVENOUS at 23:15

## 2024-10-12 RX ADMIN — CLONAZEPAM 1 MG: 0.5 TABLET ORAL at 21:02

## 2024-10-12 RX ADMIN — SODIUM CHLORIDE, PRESERVATIVE FREE 10 ML: 5 INJECTION INTRAVENOUS at 08:33

## 2024-10-12 RX ADMIN — ROSUVASTATIN CALCIUM 10 MG: 10 TABLET, FILM COATED ORAL at 21:02

## 2024-10-12 RX ADMIN — MEROPENEM 1000 MG: 1 INJECTION INTRAVENOUS at 18:35

## 2024-10-12 RX ADMIN — LISINOPRIL 20 MG: 20 TABLET ORAL at 08:32

## 2024-10-12 RX ADMIN — PROPRANOLOL HYDROCHLORIDE 40 MG: 40 TABLET ORAL at 08:32

## 2024-10-12 RX ADMIN — PANTOPRAZOLE SODIUM 40 MG: 40 TABLET, DELAYED RELEASE ORAL at 05:53

## 2024-10-12 RX ADMIN — TRAZODONE HYDROCHLORIDE 200 MG: 50 TABLET ORAL at 21:02

## 2024-10-12 RX ADMIN — Medication: at 08:33

## 2024-10-12 RX ADMIN — PROPRANOLOL HYDROCHLORIDE 40 MG: 40 TABLET ORAL at 21:02

## 2024-10-12 NOTE — PROGRESS NOTES
Department of Podiatry Progress Note  Jimbo Bruno DPM Attending       Reason for Consult:  LEFT medial hallux wound with extending cellulitis  Requesting Physician:  Magaly Schafer MD    CHIEF COMPLAINT:  Wound of LEFT Great Toe over last week, with complicating cellulitis, failed oral antibiotics    HISTORY OF PRESENT ILLNESS:                The patient is a 68 y.o. male with significant past medical history of HTN with CAD, Diabetes with peripheral neuropathy who is consulted for a week's history of wound with worsening erythema. Patient relates he was seen by Urgent Care and given oral antibiotics that initially improved his wound color as compared to previous demarcation to midfoot. When seen by Wound Care Center on Tuesday the erythema had receded to base of Great Toe from the marked midfoot, but by Wednesday the erythema returned and edema to his LEFT ankle had become of greater concern. Initially had Keflex, then given Augmentin, but had Coban wrap applied tightly to his wound. Patient presented to Emergency Dept for IV antibiotics and anticipated imaging    Nicer appearance of wound today, no systemic symptoms, patient resting comfortably    Past Medical History:        Diagnosis Date    Depression     Diabetes mellitus (HCC)     GERD (gastroesophageal reflux disease)     Hyperlipidemia     Hypertension     LBBB (left bundle branch block)     SVT (supraventricular tachycardia) (HCC)      Past Surgical History:        Procedure Laterality Date    COLONOSCOPY      COLONOSCOPY N/A 3/1/2024    COLONOSCOPY POLYPECTOMY SNARE/COLD BIOPSY performed by Ambrocio Coffey MD at [facility] ENDOSCOPY    ENDOSCOPY, COLON, DIAGNOSTIC      EYE SURGERY Right 05/01/2023    PHACOEMULSIFICATION WITH CLAREON PANOPTIX TORIC INTRAOCULAR LENS IMPLANT - RIGHT EYE performed by Luiz Mancuso MD at [facility]    EYE SURGERY Left 05/15/2023    PHACOEMULSIFICATION WITH CLAREON PANOPTIX TORIC INTRAOCULAR LENS IMPLANT - LEFT EYE performed by  Oral, Nightly  traZODone (DESYREL) tablet 200 mg, 200 mg, Oral, Nightly  insulin lispro (HUMALOG,ADMELOG) injection vial 0-16 Units, 0-16 Units, SubCUTAneous, 4x Daily AC & HS  glucose chewable tablet 16 g, 4 tablet, Oral, PRN  dextrose bolus 10% 125 mL, 125 mL, IntraVENous, PRN **OR** dextrose bolus 10% 250 mL, 250 mL, IntraVENous, PRN  glucagon injection 1 mg, 1 mg, SubCUTAneous, PRN  dextrose 10 % infusion, , IntraVENous, Continuous PRN    Allergies:   Patient has no known allergies.    Social History:    TOBACCO:  Former smoker.  Type of tobacco used:  Cigarettes.  Quantity per day:  .5 pack(s).  Duration of tobacco use:  2 years.  Approximate Quit Date:  quit when he was 16.  ETOH:  only social ETOH    Family History:       Problem Relation Age of Onset    Heart Disease Mother     Other Mother         pulmonary emboli    Diabetes Father      REVIEW OF SYSTEMS:    CONSTITUTIONAL:  negative  INTEGUMENT/BREAST:  positive for skin lesion(s) and dryness  ENDOCRINE:  positive for diabetic symptoms including neither foot ulcerations nor neuropathy  MUSCULOSKELETAL:  positive for  decreased range of motion  NEUROLOGICAL:  positive for numbness  PHYSICAL EXAM:      Vitals:    /81   Pulse 66   Temp 98 °F (36.7 °C) (Oral)   Resp 18   Ht 1.854 m (6' 1\") Comment: As per pt.  Wt 121.6 kg (268 lb 1.3 oz)   SpO2 96%   BMI 35.37 kg/m²     LABS:   Recent Labs     10/10/24  1118 10/11/24  0501   WBC 8.2 7.6   HGB 14.8 13.3*   HCT 44.4 38.6*    212     Recent Labs     10/11/24  0501      K 3.7      CO2 25   BUN 12   CREATININE 0.7*     No results for input(s): \"INR\", \"APTT\" in the last 72 hours.    Invalid input(s): \"PROT\"    LOWER EXTREMITY EXAMINATION   SKIN:    LEFT hallux wound MUGS 2 of  8 cm x 4.5 cm with depth of 0.2 cm with mixed fibrotic and sinewy base granulation with extension of erythema greater than 2 cm onto dorsum of LEFT foot    RIGHT hallux old heme beneath hyperkeratotic callus of

## 2024-10-12 NOTE — PROGRESS NOTES
Hospitalist Progress Note  10/12/2024 9:33 AM  Subjective:   Admit Date: 10/10/2024  PCP: Hebert Bolivar MD Status: Inpatient   Interval History: Hospital Day: 3, admitted with diabetic left foot / great toe ulcer with infection, refractory to outpatient antibiotics (Keflex / Augmentin).  Outpatient wound cultures from 10/8 polymicrobial with Enterobacter cloacae, Streptococcus epidermidis and E. Coli.  Seen by Wound Care Center on Tuesday the erythema had receded to base of Great Toe from the marked midfoot, but by Wednesday the erythema returned and edema to his LEFT ankle had become of greater concern.     Diet: controlled carbohydrate (60 gm/meal)  Right cephalic peripheral IV (10/10, day #3)    Medications:     nitroglycerin  1 patch TransDERmal Daily   vancomycin  1,250 mg IntraVENous Q12H (10/10, day #3)   lisinopril  20 mg Oral Daily   meropenem   1,000 mg  Intravenous Q8H (10/12, day #1)   enoxaparin  30 mg SubCUTAneous BID   clonazepam  1 mg Oral Nightly   pantoprazole  40 mg Oral QAM AC   propranolol  40 mg Oral BID   rosuvastatin  10 mg Oral Nightly   trazodone  200 mg Oral Nightly   insulin lispro SSI  0-16 Units SubCUTAneous 4x Daily AC & HS       Labs:     Recent Labs     10/10/24  1118 10/11/24  0501   WBC 8.2 7.6   HGB 14.8 13.3*    212   MCV 89.6 88.5         139   K 4.5 3.7    106   CO2 30 25   BUN 14 12   CREATININE 0.7* 0.7*   GLUCOSE 118* 80        CALCIUM 9.4 8.7   ALBUMIN 4.3  --    AST 15  --    ALT 8*  --    ALKPHOS 99  --      Lactate (10/10) 0.9 / 1.8 mmol/L  CRP (10/10) 35.5 mg/L  ESR (10/10) 41 mm/hr    Wound culture (10/10) Escherichia coli, Staphylococcus epidermidis, Enterobacter cloacae complex (light growth)  Blood culture x 2 (10/10) no growth  Wound culture (10/8) Enterobacter cloacae complex and Escherichia coli (moderate growth)    POC Glucose:   Recent Labs     10/11/24  1153 10/11/24  1623 10/11/24  1918 10/12/24  0748   POCGLU 146* 161* 195* 81  Received refill request for furosemide from [facility] pharmacy. Last ov: 11/13/2020 NPRG, 2/12/2021 KAREN    Last labs: renal 6/15/2021    Last Refill:11/16/2020 #90 with 1 refill    Next appointment: no future appointments scheduled.

## 2024-10-12 NOTE — PLAN OF CARE
Problem: Chronic Conditions and Co-morbidities  Goal: Patient's chronic conditions and co-morbidity symptoms are monitored and maintained or improved  10/12/2024 1120 by Heriberto Mart RN  Outcome: Progressing  10/12/2024 0113 by Adriane Campbell RN  Outcome: Progressing     Problem: Discharge Planning  Goal: Discharge to home or other facility with appropriate resources  10/12/2024 1120 by Heriberto Mart RN  Outcome: Progressing  10/12/2024 0113 by Adriane Campbell RN  Outcome: Progressing     Problem: Pain  Goal: Verbalizes/displays adequate comfort level or baseline comfort level  10/12/2024 1120 by Heriberto Mart RN  Outcome: Progressing  10/12/2024 0113 by Adriane Campbell RN  Outcome: Progressing     Problem: Safety - Adult  Goal: Free from fall injury  10/12/2024 1120 by Heriberto Mart RN  Outcome: Progressing  10/12/2024 0113 by Adriane Campbell RN  Outcome: Progressing     Problem: ABCDS Injury Assessment  Goal: Absence of physical injury  10/12/2024 1120 by Heriberto Mart RN  Outcome: Progressing  10/12/2024 0113 by Adriane Campbell RN  Outcome: Progressing     Problem: Cardiovascular - Adult  Goal: Maintains optimal cardiac output and hemodynamic stability  10/12/2024 1120 by Heriberto Mart RN  Outcome: Progressing  10/12/2024 0113 by Adriane Campbell RN  Outcome: Progressing  Goal: Absence of cardiac dysrhythmias or at baseline  10/12/2024 1120 by Heriberto Mart RN  Outcome: Progressing  10/12/2024 0113 by Adriane Campbell RN  Outcome: Progressing     Problem: Skin/Tissue Integrity - Adult  Goal: Skin integrity remains intact  10/12/2024 1120 by Heriberto Mart RN  Outcome: Progressing  10/12/2024 0113 by Adriane Campbell RN  Outcome: Progressing  Flowsheets (Taken 10/12/2024 0113)  Skin Integrity Remains Intact:   Monitor for areas of redness and/or skin breakdown   Assess vascular access sites hourly  Goal: Incisions, wounds, or drain sites healing without S/S of infection  10/12/2024 1120 by  Mart, Heriberto, RN  Outcome: Progressing  10/12/2024 0113 by Adriane Campbell RN  Outcome: Progressing  Goal: Oral mucous membranes remain intact  10/12/2024 1120 by Heriberto Mart RN  Outcome: Progressing  10/12/2024 0113 by Adriane Campbell RN  Outcome: Progressing     Problem: Metabolic/Fluid and Electrolytes - Adult  Goal: Electrolytes maintained within normal limits  10/12/2024 1120 by Heriberto Mart RN  Outcome: Progressing  10/12/2024 0113 by Adriane Campbell RN  Outcome: Progressing  Goal: Hemodynamic stability and optimal renal function maintained  10/12/2024 1120 by Heriberto Mart RN  Outcome: Progressing  10/12/2024 0113 by Adriane Campbell RN  Outcome: Progressing  Goal: Glucose maintained within prescribed range  10/12/2024 1120 by Heriberto Mart RN  Outcome: Progressing  10/12/2024 0113 by Adriane Campbell RN  Outcome: Progressing

## 2024-10-12 NOTE — PLAN OF CARE
Problem: Chronic Conditions and Co-morbidities  Goal: Patient's chronic conditions and co-morbidity symptoms are monitored and maintained or improved  Outcome: Progressing     Problem: Discharge Planning  Goal: Discharge to home or other facility with appropriate resources  Outcome: Progressing     Problem: Pain  Goal: Verbalizes/displays adequate comfort level or baseline comfort level  Outcome: Progressing     Problem: Safety - Adult  Goal: Free from fall injury  Outcome: Progressing     Problem: ABCDS Injury Assessment  Goal: Absence of physical injury  Outcome: Progressing     Problem: Cardiovascular - Adult  Goal: Maintains optimal cardiac output and hemodynamic stability  Outcome: Progressing  Goal: Absence of cardiac dysrhythmias or at baseline  Outcome: Progressing     Problem: Skin/Tissue Integrity - Adult  Goal: Skin integrity remains intact  Outcome: Progressing  Flowsheets (Taken 10/12/2024 0113)  Skin Integrity Remains Intact:   Monitor for areas of redness and/or skin breakdown   Assess vascular access sites hourly  Goal: Incisions, wounds, or drain sites healing without S/S of infection  Outcome: Progressing  Goal: Oral mucous membranes remain intact  Outcome: Progressing     Problem: Metabolic/Fluid and Electrolytes - Adult  Goal: Electrolytes maintained within normal limits  Outcome: Progressing  Goal: Hemodynamic stability and optimal renal function maintained  Outcome: Progressing  Goal: Glucose maintained within prescribed range  Outcome: Progressing

## 2024-10-12 NOTE — PROGRESS NOTES
Clinical Pharmacy Note  Vancomycin Consult    Tony Boyle is a 68 y.o. male ordered vancomycin for SSTI; consult received from Dr. Schafer to manage therapy. Also receiving meropenem.    Allergies:  Patient has no known allergies.     Temp max:  Temp (24hrs), Av.5 °F (36.9 °C), Min:98 °F (36.7 °C), Max:99.4 °F (37.4 °C)      Recent Labs     10/10/24  1118 10/11/24  0501   WBC 8.2 7.6       Recent Labs     10/10/24  1118 10/11/24  0501   BUN 14 12   CREATININE 0.7* 0.7*         Intake/Output Summary (Last 24 hours) at 10/12/2024 1010  Last data filed at 10/12/2024 0553  Gross per 24 hour   Intake 1770.35 ml   Output --   Net 1770.35 ml       Culture Results:  10/10/24 wound culture from toe: positive for MDRO E coli, Staph epi, and Enterobacter cloacae    Ht Readings from Last 1 Encounters:   10/10/24 1.854 m (6' 1\")        Wt Readings from Last 1 Encounters:   10/12/24 121.6 kg (268 lb 1.3 oz)         Estimated Creatinine Clearance: 134 mL/min (A) (based on SCr of 0.7 mg/dL (L)).    Assessment:  Day # 3/7 of vancomycin.  Current regimen: 1250 mg every 12 hours  Vancomycin level: 11.1 mg/L (~8 hour level)  Predicted AUC (next 24-48 h): 379, 405 @ steady state    Plan:  Change regimen to 1500 mg every 12 hours.  New predicted AUC (steady state): 482  Check next vancomycin random level on 10/14/24 with AM labs. Daily SCr ordered.    Thank you for the consult.      Maryann LindoD  10/12/2024 10:18 AM

## 2024-10-12 NOTE — PROGRESS NOTES
10/12/24 1636   Encounter Summary   Encounter Overview/Reason Spiritual/Emotional Needs   Encounter Code  Counseling, Individual, by  services   Service Provided For Patient and family together   Referral/Consult From Rounding   Last Encounter  10/12/24  (f/up visit; unresolved grief; concerned abt suffering; support & HC. PN)   Complexity of Encounter Moderate   Begin Time 1540   End Time  1613   Total Time Calculated 33 min   Spiritual/Emotional needs   Type Spiritual Support   Rituals, Rites and Sacraments   Type [employer]ist Communion   Grief, Loss, and Adjustments   Type Adjustment to illness   Assessment/Intervention/Outcome   Assessment Concerns with suffering;Hopeful;Coping;Sad   Intervention Explored/Affirmed feelings, thoughts, concerns;Explored Coping Skills/Resources;Nurtured Hope;Prayer (assurance of)/[place];Active listening   Outcome Acceptance;Encouraged;Expressed feelings, needs, and concerns;Engaged in conversation;Expressed Gratitude;Grieving

## 2024-10-13 LAB
ALBUMIN SERPL-MCNC: 3.7 G/DL (ref 3.4–5)
ANION GAP SERPL CALCULATED.3IONS-SCNC: 10 MMOL/L (ref 3–16)
BASOPHILS # BLD: 0 K/UL (ref 0–0.2)
BASOPHILS NFR BLD: 0.3 %
BUN SERPL-MCNC: 13 MG/DL (ref 7–20)
CALCIUM SERPL-MCNC: 9.3 MG/DL (ref 8.3–10.6)
CHLORIDE SERPL-SCNC: 106 MMOL/L (ref 99–110)
CO2 SERPL-SCNC: 26 MMOL/L (ref 21–32)
CREAT SERPL-MCNC: 0.6 MG/DL (ref 0.8–1.3)
DEPRECATED RDW RBC AUTO: 15.5 % (ref 12.4–15.4)
EOSINOPHIL # BLD: 0.1 K/UL (ref 0–0.6)
EOSINOPHIL NFR BLD: 1.3 %
GFR SERPLBLD CREATININE-BSD FMLA CKD-EPI: >90 ML/MIN/{1.73_M2}
GLUCOSE BLD-MCNC: 121 MG/DL (ref 70–99)
GLUCOSE BLD-MCNC: 176 MG/DL (ref 70–99)
GLUCOSE BLD-MCNC: 187 MG/DL (ref 70–99)
GLUCOSE BLD-MCNC: 192 MG/DL (ref 70–99)
GLUCOSE SERPL-MCNC: 133 MG/DL (ref 70–99)
HCT VFR BLD AUTO: 40.6 % (ref 40.5–52.5)
HGB BLD-MCNC: 13.5 G/DL (ref 13.5–17.5)
LYMPHOCYTES # BLD: 2.9 K/UL (ref 1–5.1)
LYMPHOCYTES NFR BLD: 40.5 %
MAGNESIUM SERPL-MCNC: 2.2 MG/DL (ref 1.8–2.4)
MCH RBC QN AUTO: 29.5 PG (ref 26–34)
MCHC RBC AUTO-ENTMCNC: 33.2 G/DL (ref 31–36)
MCV RBC AUTO: 88.9 FL (ref 80–100)
MONOCYTES # BLD: 0.7 K/UL (ref 0–1.3)
MONOCYTES NFR BLD: 9.7 %
NEUTROPHILS # BLD: 3.4 K/UL (ref 1.7–7.7)
NEUTROPHILS NFR BLD: 48.2 %
PERFORMED ON: ABNORMAL
PHOSPHATE SERPL-MCNC: 2.4 MG/DL (ref 2.5–4.9)
PLATELET # BLD AUTO: 258 K/UL (ref 135–450)
PMV BLD AUTO: 8.1 FL (ref 5–10.5)
POTASSIUM SERPL-SCNC: 3.9 MMOL/L (ref 3.5–5.1)
RBC # BLD AUTO: 4.57 M/UL (ref 4.2–5.9)
SODIUM SERPL-SCNC: 142 MMOL/L (ref 136–145)
WBC # BLD AUTO: 7.1 K/UL (ref 4–11)

## 2024-10-13 PROCEDURE — 6370000000 HC RX 637 (ALT 250 FOR IP): Performed by: PODIATRIST

## 2024-10-13 PROCEDURE — 2580000003 HC RX 258: Performed by: INTERNAL MEDICINE

## 2024-10-13 PROCEDURE — 94760 N-INVAS EAR/PLS OXIMETRY 1: CPT

## 2024-10-13 PROCEDURE — 36415 COLL VENOUS BLD VENIPUNCTURE: CPT

## 2024-10-13 PROCEDURE — 6370000000 HC RX 637 (ALT 250 FOR IP): Performed by: INTERNAL MEDICINE

## 2024-10-13 PROCEDURE — 85025 COMPLETE CBC W/AUTO DIFF WBC: CPT

## 2024-10-13 PROCEDURE — 83735 ASSAY OF MAGNESIUM: CPT

## 2024-10-13 PROCEDURE — 6360000002 HC RX W HCPCS: Performed by: INTERNAL MEDICINE

## 2024-10-13 PROCEDURE — 99233 SBSQ HOSP IP/OBS HIGH 50: CPT | Performed by: INTERNAL MEDICINE

## 2024-10-13 PROCEDURE — 80069 RENAL FUNCTION PANEL: CPT

## 2024-10-13 PROCEDURE — 1200000000 HC SEMI PRIVATE

## 2024-10-13 RX ORDER — VENLAFAXINE HYDROCHLORIDE 75 MG/1
150 CAPSULE, EXTENDED RELEASE ORAL
Status: DISCONTINUED | OUTPATIENT
Start: 2024-10-14 | End: 2024-10-19 | Stop reason: HOSPADM

## 2024-10-13 RX ORDER — VANCOMYCIN 1.5 G/300ML
1500 INJECTION, SOLUTION INTRAVENOUS EVERY 12 HOURS
Status: DISCONTINUED | OUTPATIENT
Start: 2024-10-13 | End: 2024-10-14

## 2024-10-13 RX ADMIN — ENOXAPARIN SODIUM 30 MG: 100 INJECTION SUBCUTANEOUS at 21:30

## 2024-10-13 RX ADMIN — PROPRANOLOL HYDROCHLORIDE 40 MG: 40 TABLET ORAL at 21:29

## 2024-10-13 RX ADMIN — TRAZODONE HYDROCHLORIDE 200 MG: 50 TABLET ORAL at 21:29

## 2024-10-13 RX ADMIN — VANCOMYCIN 1500 MG: 1.5 INJECTION, SOLUTION INTRAVENOUS at 13:42

## 2024-10-13 RX ADMIN — Medication: at 09:36

## 2024-10-13 RX ADMIN — ENOXAPARIN SODIUM 30 MG: 100 INJECTION SUBCUTANEOUS at 09:32

## 2024-10-13 RX ADMIN — PANTOPRAZOLE SODIUM 40 MG: 40 TABLET, DELAYED RELEASE ORAL at 05:54

## 2024-10-13 RX ADMIN — MEROPENEM 1000 MG: 1 INJECTION INTRAVENOUS at 18:08

## 2024-10-13 RX ADMIN — MEROPENEM 1000 MG: 1 INJECTION INTRAVENOUS at 01:18

## 2024-10-13 RX ADMIN — ROSUVASTATIN CALCIUM 10 MG: 10 TABLET, FILM COATED ORAL at 21:29

## 2024-10-13 RX ADMIN — MEROPENEM 1000 MG: 1 INJECTION INTRAVENOUS at 09:44

## 2024-10-13 RX ADMIN — LISINOPRIL 20 MG: 20 TABLET ORAL at 09:32

## 2024-10-13 RX ADMIN — VANCOMYCIN 1500 MG: 1.5 INJECTION, SOLUTION INTRAVENOUS at 22:44

## 2024-10-13 RX ADMIN — INSULIN LISPRO 4 UNITS: 100 INJECTION, SOLUTION INTRAVENOUS; SUBCUTANEOUS at 21:29

## 2024-10-13 RX ADMIN — PROPRANOLOL HYDROCHLORIDE 40 MG: 40 TABLET ORAL at 09:32

## 2024-10-13 RX ADMIN — CLONAZEPAM 1 MG: 0.5 TABLET ORAL at 21:29

## 2024-10-13 NOTE — PLAN OF CARE
Problem: Chronic Conditions and Co-morbidities  Goal: Patient's chronic conditions and co-morbidity symptoms are monitored and maintained or improved  Outcome: Progressing     Problem: Discharge Planning  Goal: Discharge to home or other facility with appropriate resources  Outcome: Progressing     Problem: Pain  Goal: Verbalizes/displays adequate comfort level or baseline comfort level  Outcome: Progressing     Problem: Safety - Adult  Goal: Free from fall injury  Outcome: Progressing     Problem: ABCDS Injury Assessment  Goal: Absence of physical injury  Outcome: Progressing     Problem: Cardiovascular - Adult  Goal: Maintains optimal cardiac output and hemodynamic stability  Outcome: Progressing  Goal: Absence of cardiac dysrhythmias or at baseline  Outcome: Progressing     Problem: Skin/Tissue Integrity - Adult  Goal: Skin integrity remains intact  Outcome: Progressing  Flowsheets (Taken 10/13/2024 1146)  Skin Integrity Remains Intact: Monitor for areas of redness and/or skin breakdown  Goal: Incisions, wounds, or drain sites healing without S/S of infection  Outcome: Progressing  Goal: Oral mucous membranes remain intact  Outcome: Progressing     Problem: Metabolic/Fluid and Electrolytes - Adult  Goal: Electrolytes maintained within normal limits  Outcome: Progressing  Goal: Hemodynamic stability and optimal renal function maintained  Outcome: Progressing  Goal: Glucose maintained within prescribed range  Outcome: Progressing     Problem: Infection - Adult  Goal: Absence of infection at discharge  Outcome: Progressing  Goal: Absence of infection during hospitalization  Outcome: Progressing  Goal: Absence of fever/infection during anticipated neutropenic period  Outcome: Progressing

## 2024-10-13 NOTE — PROGRESS NOTES
Dressing change to LLE complete, pt tolerated well.     Electronically signed by Neelam Arriaga RN on 10/13/2024 at 6:42 PM

## 2024-10-13 NOTE — PROGRESS NOTES
PHACOEMULSIFICATION WITH CLAREON PANOPTIX TORIC INTRAOCULAR LENS IMPLANT - LEFT EYE performed by Luiz Mancuso MD at [facility]    INSERTABLE CARDIAC MONITOR      out    KNEE SURGERY Right     meninscus repair    PACEMAKER INSERTION      had 8 seconds heart stop    TONSILLECTOMY      UPPER GASTROINTESTINAL ENDOSCOPY N/A 3/1/2024    ESOPHAGOGASTRODUODENOSCOPY DILATATION performed by Ambrocio Coffey MD at [facility] ENDOSCOPY       Current Medications:    Outpatient Medications Marked as Taking for the 10/10/24 encounter (Hospital Encounter)   Medication Sig Dispense Refill    tadalafil (CIALIS) 5 MG tablet Take 1 tablet by mouth daily      propranolol (INDERAL) 40 MG tablet Take 1 tablet by mouth 2 times daily      traZODone (DESYREL) 100 MG tablet Take 2 tablets by mouth nightly      lisinopril (PRINIVIL;ZESTRIL) 20 MG tablet Take 1 tablet by mouth daily      vitamin D (VITAMIN D3) 50 MCG (2000 UT) CAPS capsule Take 1 capsule by mouth daily      nitroGLYCERIN (NITRO-DUR) 0.1 MG/HR Place 1 patch onto the skin daily 30 patch 3    Dapagliflozin Pro-metFORMIN ER (XIGDUO XR)  MG TB24 Take 1 tablet by mouth daily      Insulin Degludec 200 UNIT/ML SOPN Inject 70 Units into the skin daily      Semaglutide, 1 MG/DOSE, 2 MG/1.5ML SOPN Inject 1 mg into the skin once a week      pantoprazole (PROTONIX) 40 MG tablet Take 1 tablet by mouth daily      rosuvastatin (CRESTOR) 10 MG tablet Take 1 tablet by mouth nightly      clonazePAM (KLONOPIN) 1 MG tablet Take 1 tablet by mouth nightly.      desvenlafaxine succinate (PRISTIQ) 100 MG TB24 extended release tablet Take 1 tablet by mouth daily         Allergies:  Patient has no known allergies.    Immunizations :   Immunization History   Administered Date(s) Administered    COVID-19, PFIZER Bivalent, DO NOT Dilute, (age 12y+), IM, 30 mcg/0.3 mL 01/09/2023    COVID-19, PFIZER PURPLE top, DILUTE for use, (age 12 y+), 30mcg/0.3mL 02/12/2021, 03/05/2021, 09/24/2021    COVID-19, PFIZER,  mcg/mL Intermediate     ciprofloxacin <=0.25 mcg/mL Sensitive <=0.25 mcg/mL Sensitive     ertapenem <=0.5 mcg/mL Sensitive <=0.5 mcg/mL Sensitive     gentamicin <=2 mcg/mL Sensitive <=2 mcg/mL Sensitive     levofloxacin <=0.5 mcg/mL Sensitive <=0.5 mcg/mL Sensitive     meropenem <=1 mcg/mL Sensitive <=1 mcg/mL Sensitive     piperacillin-tazobactam 16 mcg/mL Intermediate <=8 mcg/mL Sensitive     trimethoprim-sulfamethoxazole <=0.5/9.5 m... Sensitive <=0.5/9.5 m... Sensitive                     Viral Culture:    No results found for: \"COVID19\"  Urine Culture: No results for input(s): \"LABURIN\" in the last 72 hours.    Scheduled Meds:   vancomycin  1,500 mg IntraVENous Q12H    nitroGLYCERIN  1 patch TransDERmal Daily    meropenem  1,000 mg IntraVENous Q8H    vancomycin (VANCOCIN) intermittent dosing (placeholder)   Other RX Placeholder    lisinopril  20 mg Oral Daily    collagenase   Topical Daily    sodium chloride flush  5-40 mL IntraVENous 2 times per day    enoxaparin  30 mg SubCUTAneous BID    clonazePAM  1 mg Oral Nightly    pantoprazole  40 mg Oral QAM AC    propranolol  40 mg Oral BID    rosuvastatin  10 mg Oral Nightly    traZODone  200 mg Oral Nightly    insulin lispro  0-16 Units SubCUTAneous 4x Daily AC & HS       Continuous Infusions:   sodium chloride      dextrose         PRN Meds:  sodium chloride flush, sodium chloride, potassium chloride **OR** potassium alternative oral replacement **OR** potassium chloride, magnesium sulfate, ondansetron **OR** ondansetron, polyethylene glycol, acetaminophen **OR** acetaminophen, glucose, dextrose bolus **OR** dextrose bolus, glucagon (rDNA), dextrose    Imaging:   XR CHEST PORTABLE   Final Result   Left subclavian dual chamber pacer.  No pneumothorax identified.         XR FOOT LEFT (MIN 3 VIEWS)   Final Result   1. No radiographic evidence for osteomyelitis.   2. Osteoarthritis of the ankle joint.         MRI FOOT LEFT W WO CONTRAST    (Results Pending)       All

## 2024-10-13 NOTE — PLAN OF CARE
Problem: Chronic Conditions and Co-morbidities  Goal: Patient's chronic conditions and co-morbidity symptoms are monitored and maintained or improved  10/12/2024 2036 by Adriane Campbell RN  Outcome: Progressing  10/12/2024 1120 by Heriberto Mart RN  Outcome: Progressing     Problem: Discharge Planning  Goal: Discharge to home or other facility with appropriate resources  10/12/2024 2036 by Adriane Campbell RN  Outcome: Progressing  10/12/2024 1120 by Heriberto Mart RN  Outcome: Progressing     Problem: Pain  Goal: Verbalizes/displays adequate comfort level or baseline comfort level  10/12/2024 2036 by Adriane Campbell RN  Outcome: Progressing  10/12/2024 1120 by Heriberto Mart RN  Outcome: Progressing     Problem: Safety - Adult  Goal: Free from fall injury  10/12/2024 2036 by Adriane Campbell RN  Outcome: Progressing  10/12/2024 1120 by Heriberto Mart RN  Outcome: Progressing     Problem: ABCDS Injury Assessment  Goal: Absence of physical injury  10/12/2024 2036 by Adriane Campbell RN  Outcome: Progressing  10/12/2024 1120 by Heriberto Mart RN  Outcome: Progressing     Problem: Cardiovascular - Adult  Goal: Maintains optimal cardiac output and hemodynamic stability  10/12/2024 2036 by Adriane Campbell RN  Outcome: Progressing  10/12/2024 1120 by Heriberto Mart RN  Outcome: Progressing  Goal: Absence of cardiac dysrhythmias or at baseline  10/12/2024 2036 by Adriane Campbell RN  Outcome: Progressing  10/12/2024 1120 by Heriberto Mart RN  Outcome: Progressing     Problem: Skin/Tissue Integrity - Adult  Goal: Skin integrity remains intact  10/12/2024 2036 by Adriane Campbell RN  Outcome: Progressing  Flowsheets (Taken 10/12/2024 2036)  Skin Integrity Remains Intact:   Assess vascular access sites hourly   Monitor for areas of redness and/or skin breakdown  10/12/2024 1120 by Heriberto Mart RN  Outcome: Progressing  Goal: Incisions, wounds, or drain sites healing without S/S of infection  10/12/2024 2036 by  Adrian, Adriane, RN  Outcome: Progressing  10/12/2024 1120 by Heriberto Mart RN  Outcome: Progressing  Goal: Oral mucous membranes remain intact  10/12/2024 2036 by Adriane Campbell RN  Outcome: Progressing  10/12/2024 1120 by Heriberto Mart RN  Outcome: Progressing     Problem: Metabolic/Fluid and Electrolytes - Adult  Goal: Electrolytes maintained within normal limits  10/12/2024 2036 by Adriane Campbell RN  Outcome: Progressing  10/12/2024 1120 by Heriberto Mart RN  Outcome: Progressing  Goal: Hemodynamic stability and optimal renal function maintained  10/12/2024 2036 by Adriane Campbell RN  Outcome: Progressing  10/12/2024 1120 by Heriberto Mart RN  Outcome: Progressing  Goal: Glucose maintained within prescribed range  10/12/2024 2036 by Adriane Campbell RN  Outcome: Progressing  10/12/2024 1120 by Heriberto Mart RN  Outcome: Progressing     Problem: Infection - Adult  Goal: Absence of infection at discharge  Outcome: Progressing  Goal: Absence of infection during hospitalization  Outcome: Progressing  Goal: Absence of fever/infection during anticipated neutropenic period  Outcome: Progressing

## 2024-10-13 NOTE — PROGRESS NOTES
Hospitalist Progress Note  10/13/2024 10:13 AM  Subjective:   Admit Date: 10/10/2024  PCP: Hebert Bolivar MD Status: Inpatient   Interval History: Hospital Day: 4, MRI foot pending clearance for pacemaker.  Likely on Monday.      History of present illness: admitted with diabetic left foot / great toe ulcer with infection, refractory to outpatient antibiotics (Keflex / Augmentin).  Outpatient wound cultures from 10/8 polymicrobial with Enterobacter cloacae, Streptococcus epidermidis and E. Coli.  Seen by [facility] on Tuesday the erythema had receded to base of Great Toe from the marked midfoot, but by Wednesday the erythema returned and edema to his LEFT ankle had become of greater concern.      Diet: controlled carbohydrate (60 gm/meal)  Right cephalic peripheral IV (10/10, day #4)    Medications:     vancomycin  1,500 mg IntraVENous Q12H (10/10, day #4)   nitroglycerin  1 patch TransDERmal Daily   meropenem  1,000 mg IntraVENous Q8H (10/12, day #2)   lisinopril  20 mg Oral Daily   enoxaparin  30 mg SubCUTAneous BID   clonazepam  1 mg Oral Nightly   pantoprazole  40 mg Oral QAM AC   propranolol  40 mg Oral BID   rosuvastatin  10 mg Oral Nightly   trazodone  200 mg Oral Nightly   insulin lispro SSI  0-16 Units SubCUTAneous 4x Daily AC & HS       Labs:       10/10/24  1118 10/11/24  0501 10/12/24  0506 10/13/24  0529   WBC 8.2 7.6  --  7.1   HGB 14.8 13.3*  --  13.5    212  --  258   MCV 89.6 88.5  --  88.9           139  --  142   K 4.5 3.7  --  3.9    106  --  106   CO2 30 25  --  26   BUN 14 12  --  13   CREATININE 0.7* 0.7*  --  0.6*   GLUCOSE 118* 80  --  133*          MG  --   --  1.79* 2.20   PHOS  --   --   --  2.4*   CALCIUM 9.4 8.7  --  9.3   ALBUMIN 4.3  --   --  3.7   AST 15  --   --   --    ALT 8*  --   --   --    ALKPHOS 99  --   --   --      Lactate (10/10) 0.9 / 1.8 mmol/L  CRP (10/10) 35.5 mg/L  ESR (10/10) 41 mm/hr     Wound culture (10/10) Escherichia coli,

## 2024-10-14 ENCOUNTER — APPOINTMENT (OUTPATIENT)
Dept: MRI IMAGING | Age: 69
End: 2024-10-14
Payer: MEDICARE

## 2024-10-14 LAB
ALBUMIN SERPL-MCNC: 3.6 G/DL (ref 3.4–5)
ANION GAP SERPL CALCULATED.3IONS-SCNC: 10 MMOL/L (ref 3–16)
BACTERIA BLD CULT ORG #2: NORMAL
BACTERIA BLD CULT: NORMAL
BUN SERPL-MCNC: 13 MG/DL (ref 7–20)
CALCIUM SERPL-MCNC: 9.4 MG/DL (ref 8.3–10.6)
CHLORIDE SERPL-SCNC: 107 MMOL/L (ref 99–110)
CO2 SERPL-SCNC: 25 MMOL/L (ref 21–32)
CREAT SERPL-MCNC: 0.6 MG/DL (ref 0.8–1.3)
GFR SERPLBLD CREATININE-BSD FMLA CKD-EPI: >90 ML/MIN/{1.73_M2}
GLUCOSE BLD-MCNC: 139 MG/DL (ref 70–99)
GLUCOSE BLD-MCNC: 148 MG/DL (ref 70–99)
GLUCOSE BLD-MCNC: 202 MG/DL (ref 70–99)
GLUCOSE BLD-MCNC: 224 MG/DL (ref 70–99)
GLUCOSE SERPL-MCNC: 120 MG/DL (ref 70–99)
MAGNESIUM SERPL-MCNC: 1.84 MG/DL (ref 1.8–2.4)
PERFORMED ON: ABNORMAL
PHOSPHATE SERPL-MCNC: 2.1 MG/DL (ref 2.5–4.9)
POTASSIUM SERPL-SCNC: 4 MMOL/L (ref 3.5–5.1)
SODIUM SERPL-SCNC: 142 MMOL/L (ref 136–145)
VANCOMYCIN SERPL-MCNC: 17.7 UG/ML

## 2024-10-14 PROCEDURE — 6370000000 HC RX 637 (ALT 250 FOR IP): Performed by: FAMILY MEDICINE

## 2024-10-14 PROCEDURE — 36569 INSJ PICC 5 YR+ W/O IMAGING: CPT

## 2024-10-14 PROCEDURE — 6370000000 HC RX 637 (ALT 250 FOR IP): Performed by: INTERNAL MEDICINE

## 2024-10-14 PROCEDURE — C1751 CATH, INF, PER/CENT/MIDLINE: HCPCS

## 2024-10-14 PROCEDURE — 80069 RENAL FUNCTION PANEL: CPT

## 2024-10-14 PROCEDURE — 36415 COLL VENOUS BLD VENIPUNCTURE: CPT

## 2024-10-14 PROCEDURE — 2580000003 HC RX 258: Performed by: INTERNAL MEDICINE

## 2024-10-14 PROCEDURE — A9579 GAD-BASE MR CONTRAST NOS,1ML: HCPCS | Performed by: INTERNAL MEDICINE

## 2024-10-14 PROCEDURE — 83735 ASSAY OF MAGNESIUM: CPT

## 2024-10-14 PROCEDURE — 80202 ASSAY OF VANCOMYCIN: CPT

## 2024-10-14 PROCEDURE — 6360000002 HC RX W HCPCS: Performed by: INTERNAL MEDICINE

## 2024-10-14 PROCEDURE — 1200000000 HC SEMI PRIVATE

## 2024-10-14 PROCEDURE — 99233 SBSQ HOSP IP/OBS HIGH 50: CPT | Performed by: INTERNAL MEDICINE

## 2024-10-14 PROCEDURE — 94760 N-INVAS EAR/PLS OXIMETRY 1: CPT

## 2024-10-14 PROCEDURE — 6360000004 HC RX CONTRAST MEDICATION: Performed by: INTERNAL MEDICINE

## 2024-10-14 PROCEDURE — 2500000003 HC RX 250 WO HCPCS: Performed by: INTERNAL MEDICINE

## 2024-10-14 PROCEDURE — 6370000000 HC RX 637 (ALT 250 FOR IP): Performed by: PODIATRIST

## 2024-10-14 PROCEDURE — 76937 US GUIDE VASCULAR ACCESS: CPT

## 2024-10-14 PROCEDURE — 02HV33Z INSERTION OF INFUSION DEVICE INTO SUPERIOR VENA CAVA, PERCUTANEOUS APPROACH: ICD-10-PCS | Performed by: FAMILY MEDICINE

## 2024-10-14 PROCEDURE — 73720 MRI LWR EXTREMITY W/O&W/DYE: CPT

## 2024-10-14 RX ORDER — LISINOPRIL 20 MG/1
20 TABLET ORAL ONCE
Status: COMPLETED | OUTPATIENT
Start: 2024-10-14 | End: 2024-10-14

## 2024-10-14 RX ORDER — LIDOCAINE HYDROCHLORIDE 10 MG/ML
50 INJECTION, SOLUTION EPIDURAL; INFILTRATION; INTRACAUDAL; PERINEURAL ONCE
Status: COMPLETED | OUTPATIENT
Start: 2024-10-14 | End: 2024-10-14

## 2024-10-14 RX ORDER — SODIUM CHLORIDE 9 MG/ML
INJECTION, SOLUTION INTRAVENOUS PRN
Status: DISCONTINUED | OUTPATIENT
Start: 2024-10-14 | End: 2024-10-19 | Stop reason: HOSPADM

## 2024-10-14 RX ORDER — SODIUM CHLORIDE 0.9 % (FLUSH) 0.9 %
5-40 SYRINGE (ML) INJECTION PRN
Status: DISCONTINUED | OUTPATIENT
Start: 2024-10-14 | End: 2024-10-19 | Stop reason: HOSPADM

## 2024-10-14 RX ORDER — SODIUM CHLORIDE 0.9 % (FLUSH) 0.9 %
5-40 SYRINGE (ML) INJECTION EVERY 12 HOURS SCHEDULED
Status: DISCONTINUED | OUTPATIENT
Start: 2024-10-14 | End: 2024-10-19 | Stop reason: HOSPADM

## 2024-10-14 RX ORDER — LISINOPRIL 40 MG/1
40 TABLET ORAL DAILY
Status: DISCONTINUED | OUTPATIENT
Start: 2024-10-15 | End: 2024-10-19 | Stop reason: HOSPADM

## 2024-10-14 RX ADMIN — PANTOPRAZOLE SODIUM 40 MG: 40 TABLET, DELAYED RELEASE ORAL at 05:50

## 2024-10-14 RX ADMIN — VENLAFAXINE HYDROCHLORIDE 150 MG: 75 CAPSULE, EXTENDED RELEASE ORAL at 10:00

## 2024-10-14 RX ADMIN — MEROPENEM 1000 MG: 1 INJECTION INTRAVENOUS at 17:39

## 2024-10-14 RX ADMIN — MEROPENEM 1000 MG: 1 INJECTION INTRAVENOUS at 10:01

## 2024-10-14 RX ADMIN — LISINOPRIL 20 MG: 20 TABLET ORAL at 18:14

## 2024-10-14 RX ADMIN — MEROPENEM 1000 MG: 1 INJECTION INTRAVENOUS at 02:44

## 2024-10-14 RX ADMIN — ENOXAPARIN SODIUM 30 MG: 100 INJECTION SUBCUTANEOUS at 21:03

## 2024-10-14 RX ADMIN — SODIUM CHLORIDE, PRESERVATIVE FREE 10 ML: 5 INJECTION INTRAVENOUS at 09:58

## 2024-10-14 RX ADMIN — TRAZODONE HYDROCHLORIDE 200 MG: 50 TABLET ORAL at 21:02

## 2024-10-14 RX ADMIN — LIDOCAINE HYDROCHLORIDE 50 MG: 10 INJECTION, SOLUTION EPIDURAL; INFILTRATION; INTRACAUDAL; PERINEURAL at 19:27

## 2024-10-14 RX ADMIN — ENOXAPARIN SODIUM 30 MG: 100 INJECTION SUBCUTANEOUS at 10:00

## 2024-10-14 RX ADMIN — PROPRANOLOL HYDROCHLORIDE 40 MG: 40 TABLET ORAL at 10:11

## 2024-10-14 RX ADMIN — LISINOPRIL 20 MG: 20 TABLET ORAL at 10:11

## 2024-10-14 RX ADMIN — Medication: at 07:00

## 2024-10-14 RX ADMIN — INSULIN LISPRO 4 UNITS: 100 INJECTION, SOLUTION INTRAVENOUS; SUBCUTANEOUS at 21:03

## 2024-10-14 RX ADMIN — SODIUM CHLORIDE, PRESERVATIVE FREE 10 ML: 5 INJECTION INTRAVENOUS at 21:02

## 2024-10-14 RX ADMIN — ROSUVASTATIN CALCIUM 10 MG: 10 TABLET, FILM COATED ORAL at 21:02

## 2024-10-14 RX ADMIN — GADOTERIDOL 20 ML: 279.3 INJECTION, SOLUTION INTRAVENOUS at 12:51

## 2024-10-14 RX ADMIN — CLONAZEPAM 1 MG: 0.5 TABLET ORAL at 21:02

## 2024-10-14 RX ADMIN — VANCOMYCIN 1500 MG: 1.5 INJECTION, SOLUTION INTRAVENOUS at 11:21

## 2024-10-14 RX ADMIN — SODIUM CHLORIDE, PRESERVATIVE FREE 10 ML: 5 INJECTION INTRAVENOUS at 21:03

## 2024-10-14 RX ADMIN — SODIUM CHLORIDE 5 ML/HR: 9 INJECTION, SOLUTION INTRAVENOUS at 10:00

## 2024-10-14 RX ADMIN — PROPRANOLOL HYDROCHLORIDE 40 MG: 40 TABLET ORAL at 21:02

## 2024-10-14 RX ADMIN — INSULIN LISPRO 4 UNITS: 100 INJECTION, SOLUTION INTRAVENOUS; SUBCUTANEOUS at 17:37

## 2024-10-14 NOTE — PROGRESS NOTES
Clinical Pharmacy Note  Vancomycin Consult    Tony Boyle is a 68 y.o. male ordered vancomycin for SSTI; consult received from Dr. Schafer to manage therapy. Also receiving meropenem.    Allergies:  Patient has no known allergies.     Temp max:  Temp (24hrs), Av.1 °F (36.7 °C), Min:97.6 °F (36.4 °C), Max:98.7 °F (37.1 °C)      Recent Labs     10/13/24  0529   WBC 7.1       Recent Labs     10/13/24  0529 10/14/24  0540   BUN 13 13   CREATININE 0.6* 0.6*         Intake/Output Summary (Last 24 hours) at 10/14/2024 0808  Last data filed at 10/14/2024 0549  Gross per 24 hour   Intake 1114.02 ml   Output --   Net 1114.02 ml       Culture Results:  10/10/24 wound culture from toe: positive for MDRO E coli, Staph epi, and Enterobacter cloacae    Ht Readings from Last 1 Encounters:   10/10/24 1.854 m (6' 1\")        Wt Readings from Last 1 Encounters:   10/14/24 122.2 kg (269 lb 6.4 oz)         Estimated Creatinine Clearance: 157 mL/min (A) (based on SCr of 0.6 mg/dL (L)).    Assessment:  Day # 5/7 of vancomycin.  Current regimen: 1500  mg every 12 hours  Vancomycin level: 17.7 mg/L (~7 hour level)  Predicted AUC (next 24-48 h): 455, 462 @ steady state    Plan:  Continue Vancomycin 1500mg q12h   Daily SCr ordered.    Thank you for the consult.    Asad Livingston [facility], 10/14/2024 8:10 AM

## 2024-10-14 NOTE — CARE COORDINATION
10/14 Plan: TBD. Await MRI for Podiatry Plan. ID-will need OPAT. [facility] and [facility] following. Electronically signed by Celina Aguilar RN on 10/14/2024 at 4:44 PM

## 2024-10-14 NOTE — PLAN OF CARE
Problem: Chronic Conditions and Co-morbidities  Goal: Patient's chronic conditions and co-morbidity symptoms are monitored and maintained or improved  10/13/2024 2233 by Adriane Campbell RN  Outcome: Progressing  10/13/2024 1147 by Neelam Arriaga RN  Outcome: Progressing     Problem: Discharge Planning  Goal: Discharge to home or other facility with appropriate resources  10/13/2024 2233 by Adriane Campbell RN  Outcome: Progressing  10/13/2024 1147 by Neelam Arriaga RN  Outcome: Progressing     Problem: Pain  Goal: Verbalizes/displays adequate comfort level or baseline comfort level  10/13/2024 2233 by Adriane Campbell RN  Outcome: Progressing  10/13/2024 1147 by Neelam Arriaga RN  Outcome: Progressing     Problem: Safety - Adult  Goal: Free from fall injury  10/13/2024 2233 by Adriane Campbell RN  Outcome: Progressing  10/13/2024 1147 by Neelam Arriaga RN  Outcome: Progressing     Problem: ABCDS Injury Assessment  Goal: Absence of physical injury  10/13/2024 2233 by Adriane Campbell RN  Outcome: Progressing  10/13/2024 1147 by Neelam Arriaga RN  Outcome: Progressing     Problem: Cardiovascular - Adult  Goal: Maintains optimal cardiac output and hemodynamic stability  10/13/2024 2233 by Adriane Campbell RN  Outcome: Progressing  10/13/2024 1147 by Neelam Arriaga RN  Outcome: Progressing  Goal: Absence of cardiac dysrhythmias or at baseline  10/13/2024 2233 by Adriane Campbell RN  Outcome: Progressing  10/13/2024 1147 by Neelam Arriaga RN  Outcome: Progressing     Problem: Skin/Tissue Integrity - Adult  Goal: Skin integrity remains intact  10/13/2024 2233 by Adriane Campbell RN  Outcome: Progressing  Flowsheets (Taken 10/13/2024 2232)  Skin Integrity Remains Intact:   Monitor for areas of redness and/or skin breakdown   Assess vascular access sites hourly  10/13/2024 1147 by Neelam Arriaga RN  Outcome: Progressing  Flowsheets (Taken 10/13/2024 1146)  Skin Integrity Remains Intact: Monitor for areas  Length To Time In Minutes Device Was In Place: 10

## 2024-10-14 NOTE — PROGRESS NOTES
Patient alert and oriented x4  VS stable on room air  Denies any pain or discomfort    Patient ambulating without difficulties     AM meds complete  Patient tolerated well    Patient left in bed   Call light within reach    MRI checklist completed

## 2024-10-14 NOTE — PROGRESS NOTES
Infectious Diseases Inpatient Progress Note      CHIEF COMPLAINT:     Left diabetic foot infection  Left great toe cellulitis  Left great toe osteomyelitis  MDRO infection  ESR elevation  CRP elevation    HISTORY OF PRESENT ILLNESS:  68 y.o. male with a significant history for diabetes, neuropathy, hypertension, hyperlipidemia, depression, history of pacemaker implantation admitted to hospital secondary to left great toe ulceration nonhealing wound associated cellulitis.  Patient noticed a blister on the medial aspect of the left great toe on 9/20/24, since then the blister progressed to open wound associated with cellulitis he was evaluated recently in the wound care center secondary to nonhealing left great toe ulceration.  Wound culture now positive for E. coli MDRO due to concern for worsening cellulitis now admitted to hospital was seen by  from podiatry.  Lactic 2 creatinine 0.7 CRP 35.5 LFT normal WBC normal ESR 41 wound culture now positive for E. coli, Enterobacter cloacae blood cultures negative MRI pending    Interval history: Left foot swelling and redness improving left great toe dressing intact seen by podiatry MRI with osteomyelitis and  wound cultures noted MDRO discussed the patient      Past Medical History:    Past Medical History:   Diagnosis Date    Depression     Diabetes mellitus (HCC)     GERD (gastroesophageal reflux disease)     Hyperlipidemia     Hypertension     LBBB (left bundle branch block)     SVT (supraventricular tachycardia) (HCC)        Past Surgical History:    Past Surgical History:   Procedure Laterality Date    COLONOSCOPY      COLONOSCOPY N/A 3/1/2024    COLONOSCOPY POLYPECTOMY SNARE/COLD BIOPSY performed by Ambrocio Coffey MD at [facility] ENDOSCOPY    ENDOSCOPY, COLON, DIAGNOSTIC      EYE SURGERY Right 05/01/2023    PHACOEMULSIFICATION WITH CLAREON PANOPTIX TORIC INTRAOCULAR LENS IMPLANT - RIGHT EYE performed by Luiz Mancuso MD at [facility]    EYE SURGERY Left

## 2024-10-14 NOTE — PLAN OF CARE
renal function maintained  Outcome: Progressing  Flowsheets (Taken 10/14/2024 0945)  Hemodynamic stability and optimal renal function maintained:   Monitor labs and assess for signs and symptoms of volume excess or deficit   Monitor intake, output and patient weight   Monitor response to interventions for patient's volume status, including labs, urine output, blood pressure (other measures as available)   Encourage oral intake as appropriate  Goal: Glucose maintained within prescribed range  Outcome: Progressing  Flowsheets (Taken 10/14/2024 0945)  Glucose maintained within prescribed range: Monitor blood glucose as ordered     Problem: Infection - Adult  Goal: Absence of infection at discharge  Outcome: Progressing  Flowsheets (Taken 10/14/2024 0945)  Absence of infection at discharge:   Assess and monitor for signs and symptoms of infection   Monitor lab/diagnostic results   Administer medications as ordered   Instruct and encourage patient and family to use good hand hygiene technique   Identify and instruct in appropriate isolation precautions for identified infection/condition  Goal: Absence of infection during hospitalization  Outcome: Progressing  Flowsheets (Taken 10/14/2024 0945)  Absence of infection during hospitalization:   Assess and monitor for signs and symptoms of infection   Monitor lab/diagnostic results   Administer medications as ordered   Instruct and encourage patient and family to use good hand hygiene technique   Identify and instruct in appropriate isolation precautions for identified infection/condition  Goal: Absence of fever/infection during anticipated neutropenic period  Outcome: Progressing  Flowsheets (Taken 10/14/2024 0945)  Absence of fever/infection during anticipated neutropenic period: Monitor white blood cell count     Problem: Skin/Tissue Integrity  Goal: Absence of new skin breakdown  Description: 1.  Monitor for areas of redness and/or skin breakdown  Outcome: Progressing

## 2024-10-14 NOTE — PROGRESS NOTES
Department of Podiatry Progress Note  Jimbo Bruno DPM Attending       Reason for Consult:  LEFT medial hallux wound with extending cellulitis  Requesting Physician:  Magaly Schafer MD    CHIEF COMPLAINT:  Wound of LEFT Great Toe over last week, with complicating cellulitis, failed oral antibiotics    HISTORY OF PRESENT ILLNESS:                The patient is a 68 y.o. male with significant past medical history of HTN with CAD, Diabetes with peripheral neuropathy who is consulted for a week's history of wound with worsening erythema. Patient relates he was seen by Urgent Care and given oral antibiotics that initially improved his wound color as compared to previous demarcation to midfoot. When seen by [facility] on Tuesday the erythema had receded to base of Great Toe from the marked midfoot, but by Wednesday the erythema returned and edema to his LEFT ankle had become of greater concern. Initially had Keflex, then given Augmentin, but had Coban wrap applied tightly to his wound. Patient presented to Emergency Dept for IV antibiotics and anticipated imaging    Patient away at [facility], chart review    Past Medical History:        Diagnosis Date    Depression     Diabetes mellitus (HCC)     GERD (gastroesophageal reflux disease)     Hyperlipidemia     Hypertension     LBBB (left bundle branch block)     SVT (supraventricular tachycardia) (HCC)      Past Surgical History:        Procedure Laterality Date    COLONOSCOPY      COLONOSCOPY N/A 3/1/2024    COLONOSCOPY POLYPECTOMY SNARE/COLD BIOPSY performed by Ambrocio Coffey MD at [facility] ENDOSCOPY    ENDOSCOPY, COLON, DIAGNOSTIC      EYE SURGERY Right 05/01/2023    PHACOEMULSIFICATION WITH CLAREON PANOPTIX TORIC INTRAOCULAR LENS IMPLANT - RIGHT EYE performed by Luiz Mancuso MD at [facility]    EYE SURGERY Left 05/15/2023    PHACOEMULSIFICATION WITH CLAREON PANOPTIX TORIC INTRAOCULAR LENS IMPLANT - LEFT EYE performed by Luiz Mancuso MD at [facility]    INSERTABLE  suppository 650 mg, 650 mg, Rectal, Q6H PRN  clonazePAM (KLONOPIN) tablet 1 mg, 1 mg, Oral, Nightly  pantoprazole (PROTONIX) tablet 40 mg, 40 mg, Oral, QAM AC  propranolol (INDERAL) tablet 40 mg, 40 mg, Oral, BID  rosuvastatin (CRESTOR) tablet 10 mg, 10 mg, Oral, Nightly  traZODone (DESYREL) tablet 200 mg, 200 mg, Oral, Nightly  insulin lispro (HUMALOG,ADMELOG) injection vial 0-16 Units, 0-16 Units, SubCUTAneous, 4x Daily AC & HS  glucose chewable tablet 16 g, 4 tablet, Oral, PRN  dextrose bolus 10% 125 mL, 125 mL, IntraVENous, PRN **OR** dextrose bolus 10% 250 mL, 250 mL, IntraVENous, PRN  glucagon injection 1 mg, 1 mg, SubCUTAneous, PRN  dextrose 10 % infusion, , IntraVENous, Continuous PRN    Allergies:   Patient has no known allergies.      REVIEW OF SYSTEMS:    CONSTITUTIONAL:  negative  INTEGUMENT/BREAST:  positive for skin lesion(s) and dryness  ENDOCRINE:  positive for diabetic symptoms including neither foot ulcerations nor neuropathy  MUSCULOSKELETAL:  positive for  decreased range of motion  NEUROLOGICAL:  positive for numbness  PHYSICAL EXAM:      Vitals:    BP (!) 156/73   Pulse 76   Temp 97.9 °F (36.6 °C) (Oral)   Resp 14   Ht 1.854 m (6' 1\") Comment: As per pt.  Wt 122.2 kg (269 lb 6.4 oz)   SpO2 97%   BMI 35.54 kg/m²     LABS:   Recent Labs     10/13/24  0529   WBC 7.1   HGB 13.5   HCT 40.6        Recent Labs     10/14/24  0540      K 4.0      CO2 25   PHOS 2.1*   BUN 13   CREATININE 0.6*     No results for input(s): \"INR\", \"APTT\" in the last 72 hours.    Invalid input(s): \"PROT\"    LOWER EXTREMITY EXAMINATION   SKIN:    LEFT hallux wound MUGS 2 of  8 cm x 4.5 cm with depth of 0.2 cm with mixed fibrotic and sinewy base granulation with extension of erythema greater than 2 cm onto dorsum of LEFT foot    RIGHT hallux old heme beneath hyperkeratotic callus of medial great toe, no signs of infection    NEUROLOGIC:    Pain sensation isdecreased Bilateral.  Light touch is

## 2024-10-14 NOTE — PROGRESS NOTES
Comprehensive Nutrition Assessment    Type and Reason for Visit:  Initial, Consult    Nutrition Recommendations/Plan:   Continue CCC (4) diet  Add Fruit Punch Ivan bid     Malnutrition Assessment:  Malnutrition Status:  No malnutrition (10/14/24 1515)    Context:  Chronic Illness     Findings of the 6 clinical characteristics of malnutrition:  Energy Intake:  No significant decrease in energy intake  Weight Loss:  No significant weight loss     Body Fat Loss:  No significant body fat loss     Muscle Mass Loss:  No significant muscle mass loss    Fluid Accumulation:  No significant fluid accumulation Extremities   Strength:  Not Performed    Nutrition Assessment:    Consult for altered skin integrity. PMH includes; Pacer, DM, GERD, HLD, HTN. Pt follows with Podiatry and was sent to hospital for worsened wound to left foot. Diet adv to CCC (4). Pt endorsed a good appetite / po intake. Agreed to adding Fruit Punch Ivan bid to help with wound healing. Pt denied ed needs regarding carb control. Wt reported as stable. Will continue to follow wound status and progress.    Nutrition Related Findings:    Labs reviewed. Noted improvement in BS with coverage. Noted A1C at 9. Noted Phos down to 2.1. Noted BM on 10/12. Noted non-pitting edema to RLE and +1 edema to LLE. Wound Type: Multiple (abrasion on right great toe, blister to right 2nd toe, wound to left great toe. Follows with Podiatry.)       Current Nutrition Intake & Therapies:    Average Meal Intake: %     ADULT DIET; Regular; 4 carb choices (60 gm/meal)  ADULT ORAL NUTRITION SUPPLEMENT; Breakfast, Dinner; Wound Healing Oral Supplement    Anthropometric Measures:  Height: 185.4 cm (6' 1\") (As per pt.)  Ideal Body Weight (IBW): 184 lbs (84 kg)    Admission Body Weight: 122 kg (269 lb)  Current Body Weight: 122 kg (269 lb),   IBW. Weight Source: Bed Scale  Current BMI (kg/m2): 35.5                          BMI Categories: Obese Class 2 (BMI 35.0

## 2024-10-14 NOTE — DISCHARGE INSTR - COC
Continuity of Care Form    Patient Name: Tony Boyle   :  1955  MRN:  [id number]    Admit date:  10/10/2024  Discharge date:  10/18/24    Code Status Order: Full Code   Advance Directives:   Advance Care Flowsheet Documentation             Admitting Physician:  Magaly Schafer MD  PCP: Hebert Bolivar MD    Discharging Nurse: TELLO Pelayo  Discharging Hospital Unit/Room#: Q0M-3946/4131-01  Discharging Unit Phone Number: [phone]    Emergency Contact:   Extended Emergency Contact Information  Primary Emergency Contact: [place] [address] [facility]  Home Phone: [phone]  Mobile Phone: [phone]  Relation: Child  Secondary Emergency Contact: Sylvie Ferreira  Mobile Phone: [phone]  Relation: Child    Past Surgical History:  Past Surgical History:   Procedure Laterality Date    COLONOSCOPY      COLONOSCOPY N/A 3/1/2024    COLONOSCOPY POLYPECTOMY SNARE/COLD BIOPSY performed by Ambrocio Coffey MD at [facility] ENDOSCOPY    ENDOSCOPY, COLON, DIAGNOSTIC      EYE SURGERY Right 2023    PHACOEMULSIFICATION WITH CLAREON PANOPTIX TORIC INTRAOCULAR LENS IMPLANT - RIGHT EYE performed by Luiz Mancuso MD at [facility]    EYE SURGERY Left 05/15/2023    PHACOEMULSIFICATION WITH CLAREON PANOPTIX TORIC INTRAOCULAR LENS IMPLANT - LEFT EYE performed by Luiz Mancuso MD at [facility]    INSERTABLE CARDIAC MONITOR      out    KNEE SURGERY Right     meninscus repair    PACEMAKER INSERTION      had 8 seconds heart stop    TONSILLECTOMY      UPPER GASTROINTESTINAL ENDOSCOPY N/A 3/1/2024    ESOPHAGOGASTRODUODENOSCOPY DILATATION performed by Ambrocio Coffey MD at [facility] ENDOSCOPY       Immunization History:   Immunization History   Administered Date(s) Administered    COVID-19, PFIZER Bivalent, DO NOT Dilute, (age 12y+), IM, 30 mcg/0.3 mL 2023    COVID-19, PFIZER PURPLE top, DILUTE for use, (age 12 y+), 30mcg/0.3mL 2021, 2021, 2021    COVID-19, PFIZER,  no  Last Modified Barium Swallow with Video (Video Swallowing Test): not done    Treatments at the Time of Hospital Discharge:   Respiratory Treatments: n/a  Oxygen Therapy:  is not on home oxygen therapy.  Ventilator:    - No ventilator support    Rehab Therapies: {THERAPEUTIC INTERVENTION:4007792433}  Weight Bearing Status/Restrictions: {[facility] Weight Bearin}  Other Medical Equipment (for information only, NOT a DME order):  {EQUIPMENT:146677469}  Other Treatments: ***    Patient's personal belongings (please select all that are sent with patient):  {CHP DME Belongings:442058263}    RN SIGNATURE:  {Esignature:562845497}    CASE MANAGEMENT/SOCIAL WORK SECTION    Inpatient Status Date: 10/10/24    Readmission Risk Assessment Score:  Readmission Risk              Risk of Unplanned Readmission:  14           Discharging to Facility/ Agency   Name: Discharging to Facility/ Agency   Name:  [facility]    Address: [address] [place] [address]  Phone: [phone]  Fax: [phone]      [facility]:  Name:     [facility]  Address: [address] [place] [address]  Phone:    [phone]  Fax:        [phone]     / signature: Electronically signed by Celina Aguilar RN on 10/18/24 at 10:28 AM EDT    PHYSICIAN SECTION    Prognosis: {Prognosis:4077982797}    Condition at Discharge: { Patient Condition:379460285}    Rehab Potential (if transferring to Rehab): {Prognosis:3573721008}    Recommended Labs or Other Treatments After Discharge:   IV Meropenem x 1 gm Q 8 hr x stop date x    CBC with diff, CMP, ESR, CRP weekly  Fax results to   PICC line in place  First dose given in Hospital   No ID follow up     Dr.Ravindhar Meryl GREEN  Infectious Disease  [facility] Physician  Phone: [phone]   Fax : [phone]       Physician Certification: I certify the above information and transfer of Tony GARCIA

## 2024-10-14 NOTE — PROGRESS NOTES
Order for PICC line Dr. Sheth.. Pre procedure and timeout done with pt's RN.    Successful insertion of a SL PICC line into pt's right basilic vein. No issues gaining access or advancing guidewire/introducer/PICC line. PICC tip terminates in the SVC according to [employer] 3CG tip confirmation system. PICC was seen dropping into SVC with tip tracking technology and discernable peaked p waves were noted without negative deflection. A printout will be in pt's chart.     PICC is cut at 46cm and out externally 0cm. SL flushes without resistance and draw back brisk blood return.    PICC site CDI with hemostasis maintained and a chg dressing applied to site. Pt instructed to stay in bed and keep arm flat and still for 30 minutes  to promote hemostasis.     Katt JAVED given handoff report

## 2024-10-14 NOTE — PROGRESS NOTES
Spiritual Health History and Assessment/Progress Note  [facility]    (P) Spiritual/Emotional Needs,  , (P) Life Adjustments, Adjustment to illness, Grief and loss,      Name: Tony Boyle MRN: [id number]    Age: 68 y.o.     Sex: male   Language: English   Gnosticism: Samaritan   Left foot infection     Date: 10/14/2024            Total Time Calculated: (P) 20 min              Spiritual Assessment began in [facility]        Referral/Consult From: Rounding   Encounter Overview/Reason: (P) Spiritual/Emotional Needs  Service Provided For: (P) Patient and family together    Jessy, Belief, Meaning:   Patient is connected with a jessy tradition or spiritual practice and has beliefs or practices that help with coping during difficult times  Family/Friends Other: Unknown    Community:  Patient feels well-supported. Support system includes: Children    Assessment and Plan of Care:   Patient Interventions include: Facilitated expression of thoughts and feelings, Explored spiritual coping/struggle/distress, and Other: Narrative of life change, and sharing passing/afterlife wife  Family/Friends Interventions include: Facilitated expression of thoughts and feelings    Patient Plan of Care: Other: Continue to offer emotional/spiritual support as needed    Electronically signed by JENNY Licea on 10/14/2024 at 3:03 PM

## 2024-10-14 NOTE — PROGRESS NOTES
V2.0    [facility] Progress Note      Name:  Tony Boyle /Age/Sex: 1955  (68 y.o. male)   MRN & CSN:  [id number] & [id number] Encounter Date/Time: 10/14/2024 11:00 AM EDT   Location:  [id number]/4131-01 PCP: Hebert Bolivar MD     Attending:Taye Nix MD       Hospital Day: 5    Assessment and Recommendations   Tony Boyle is a 68 y.o. male with pmh of diabetes, GERD, depression, HLD, HTN, who presents with Left foot infection      Plan:   Cellulitis of left foot  Culture growing MDRO E. coli, staph epi, Enterobacter Cloacae  Patient is currently on vancomycin and meropenem IV  Podiatry on board  Infectious disease on board    2.  Diabetes mellitus with neuropathy  Hemoglobin A1c 9.9 on presentation  Insulin sliding scale 3 times daily with meals    3.  HDL  Patient is currently on rosuvastatin    4.  Left great toe ulcer  Follows up with podiatry Dr. Bruno  Podiatry on board    5.  GERD  Protonix 40 mg daily    6.  Anxiety and depression  Patient currently on clonazepam 1 mg and trazodone 200 mg at bedtime    Diet ADULT DIET; Regular; 4 carb choices (60 gm/meal)   DVT Prophylaxis [x] Lovenox, []  Heparin, [] SCDs, [x] Ambulation,  [] Eliquis, [] Xarelto  [] Coumadin   Code Status Full Code   Disposition From: Home  Expected Disposition: Home versus SNF  Estimated Date of Discharge: 2-3 days  Patient requires continued admission due to upon clinical improvement and surgical evaluation by podiatry   Surrogate Decision Maker/ POA       Personally reviewed Lab Studies and Imaging       Medical Decision Making:  The following items were considered in medical decision making:  Discussion of patient care with other providers  Reviewed clinical lab tests  Reviewed radiology tests  Reviewed other diagnostic tests/interventions  Independent review of radiologic images  Microbiology cultures and other micro tests reviewed      Subjective:     Chief Complaint: Cellulitis of left foot    Tony Boyle     pantoprazole  40 mg Oral QAM AC    propranolol  40 mg Oral BID    rosuvastatin  10 mg Oral Nightly    traZODone  200 mg Oral Nightly    insulin lispro  0-16 Units SubCUTAneous 4x Daily AC & HS      Infusions:    sodium chloride 5 mL/hr (10/14/24 1000)    dextrose       PRN Meds: sodium chloride flush, 5-40 mL, PRN  sodium chloride, , PRN  potassium chloride, 40 mEq, PRN   Or  potassium alternative oral replacement, 40 mEq, PRN   Or  potassium chloride, 10 mEq, PRN  magnesium sulfate, 2,000 mg, PRN  ondansetron, 4 mg, Q8H PRN   Or  ondansetron, 4 mg, Q6H PRN  polyethylene glycol, 17 g, Daily PRN  acetaminophen, 650 mg, Q6H PRN   Or  acetaminophen, 650 mg, Q6H PRN  glucose, 4 tablet, PRN  dextrose bolus, 125 mL, PRN   Or  dextrose bolus, 250 mL, PRN  glucagon (rDNA), 1 mg, PRN  dextrose, , Continuous PRN        Labs and Imaging   XR CHEST PORTABLE    Result Date: 10/10/2024  EXAMINATION: ONE XRAY VIEW OF THE CHEST 10/10/2024 3:29 pm COMPARISON: 01/18/2018 HISTORY: ORDERING SYSTEM PROVIDED HISTORY: pacemaker check TECHNOLOGIST PROVIDED HISTORY: Reason for exam:->pacemaker check Reason for Exam: pacemaker check FINDINGS: Left subclavian dual lead pacer in place.  The cardiomediastinal silhouette is unchanged in appearance. There is no consolidation, pneumothorax, or evidence of edema. No effusion is appreciated. The osseous structures are unchanged in appearance.     Left subclavian dual chamber pacer.  No pneumothorax identified.     XR FOOT LEFT (MIN 3 VIEWS)    Result Date: 10/10/2024  EXAMINATION: THREE XRAY VIEWS OF THE LEFT FOOT 10/10/2024 11:04 am COMPARISON: None. HISTORY: ORDERING SYSTEM PROVIDED HISTORY: left great toe wound TECHNOLOGIST PROVIDED HISTORY: Reason for exam:->left great toe wound FINDINGS: There is some osteoarthritis of the ankle joint, with some osteophyte formation.  The rest of the foot appears unremarkable.  There is no evidence for any bony lysis to suggest osteomyelitis.  The 1st toe

## 2024-10-14 NOTE — PROGRESS NOTES
Order for PICC line received    Consent for PICC placement obtained  Call placed to [facility] for picc insertion    Electronically signed by Jayne Hobson RN on 10/14/2024 at 5:56 PM

## 2024-10-15 ENCOUNTER — APPOINTMENT (OUTPATIENT)
Dept: GENERAL RADIOLOGY | Age: 69
End: 2024-10-15
Payer: MEDICARE

## 2024-10-15 ENCOUNTER — ANESTHESIA EVENT (OUTPATIENT)
Dept: OPERATING ROOM | Age: 69
End: 2024-10-15
Payer: MEDICARE

## 2024-10-15 LAB
ANION GAP SERPL CALCULATED.3IONS-SCNC: 8 MMOL/L (ref 3–16)
BUN SERPL-MCNC: 10 MG/DL (ref 7–20)
CALCIUM SERPL-MCNC: 9.2 MG/DL (ref 8.3–10.6)
CHLORIDE SERPL-SCNC: 105 MMOL/L (ref 99–110)
CO2 SERPL-SCNC: 29 MMOL/L (ref 21–32)
CREAT SERPL-MCNC: 0.5 MG/DL (ref 0.8–1.3)
ERYTHROCYTE [SEDIMENTATION RATE] IN BLOOD BY WESTERGREN METHOD: 43 MM/HR (ref 0–20)
GFR SERPLBLD CREATININE-BSD FMLA CKD-EPI: >90 ML/MIN/{1.73_M2}
GLUCOSE BLD-MCNC: 135 MG/DL (ref 70–99)
GLUCOSE BLD-MCNC: 220 MG/DL (ref 70–99)
GLUCOSE BLD-MCNC: 238 MG/DL (ref 70–99)
GLUCOSE BLD-MCNC: 304 MG/DL (ref 70–99)
GLUCOSE SERPL-MCNC: 159 MG/DL (ref 70–99)
INR PPP: 0.89 (ref 0.85–1.15)
PERFORMED ON: ABNORMAL
POTASSIUM SERPL-SCNC: 4.1 MMOL/L (ref 3.5–5.1)
PROTHROMBIN TIME: 12.2 SEC (ref 11.9–14.9)
SODIUM SERPL-SCNC: 142 MMOL/L (ref 136–145)

## 2024-10-15 PROCEDURE — 6360000002 HC RX W HCPCS: Performed by: INTERNAL MEDICINE

## 2024-10-15 PROCEDURE — 6370000000 HC RX 637 (ALT 250 FOR IP): Performed by: FAMILY MEDICINE

## 2024-10-15 PROCEDURE — 99233 SBSQ HOSP IP/OBS HIGH 50: CPT | Performed by: INTERNAL MEDICINE

## 2024-10-15 PROCEDURE — 2580000003 HC RX 258: Performed by: INTERNAL MEDICINE

## 2024-10-15 PROCEDURE — 85610 PROTHROMBIN TIME: CPT

## 2024-10-15 PROCEDURE — 94760 N-INVAS EAR/PLS OXIMETRY 1: CPT

## 2024-10-15 PROCEDURE — 6370000000 HC RX 637 (ALT 250 FOR IP): Performed by: INTERNAL MEDICINE

## 2024-10-15 PROCEDURE — 1200000000 HC SEMI PRIVATE

## 2024-10-15 PROCEDURE — 6370000000 HC RX 637 (ALT 250 FOR IP): Performed by: PODIATRIST

## 2024-10-15 PROCEDURE — 85652 RBC SED RATE AUTOMATED: CPT

## 2024-10-15 PROCEDURE — 80048 BASIC METABOLIC PNL TOTAL CA: CPT

## 2024-10-15 PROCEDURE — 71046 X-RAY EXAM CHEST 2 VIEWS: CPT

## 2024-10-15 RX ADMIN — SODIUM CHLORIDE, PRESERVATIVE FREE 10 ML: 5 INJECTION INTRAVENOUS at 09:53

## 2024-10-15 RX ADMIN — ENOXAPARIN SODIUM 30 MG: 100 INJECTION SUBCUTANEOUS at 09:45

## 2024-10-15 RX ADMIN — MEROPENEM 1000 MG: 1 INJECTION INTRAVENOUS at 01:07

## 2024-10-15 RX ADMIN — MEROPENEM 1000 MG: 1 INJECTION INTRAVENOUS at 18:16

## 2024-10-15 RX ADMIN — INSULIN LISPRO 4 UNITS: 100 INJECTION, SOLUTION INTRAVENOUS; SUBCUTANEOUS at 12:46

## 2024-10-15 RX ADMIN — VENLAFAXINE HYDROCHLORIDE 150 MG: 75 CAPSULE, EXTENDED RELEASE ORAL at 09:42

## 2024-10-15 RX ADMIN — INSULIN LISPRO 4 UNITS: 100 INJECTION, SOLUTION INTRAVENOUS; SUBCUTANEOUS at 22:06

## 2024-10-15 RX ADMIN — LISINOPRIL 40 MG: 40 TABLET ORAL at 09:42

## 2024-10-15 RX ADMIN — PROPRANOLOL HYDROCHLORIDE 40 MG: 40 TABLET ORAL at 22:00

## 2024-10-15 RX ADMIN — CLONAZEPAM 1 MG: 0.5 TABLET ORAL at 22:00

## 2024-10-15 RX ADMIN — MEROPENEM 1000 MG: 1 INJECTION INTRAVENOUS at 10:03

## 2024-10-15 RX ADMIN — PROPRANOLOL HYDROCHLORIDE 40 MG: 40 TABLET ORAL at 09:42

## 2024-10-15 RX ADMIN — PANTOPRAZOLE SODIUM 40 MG: 40 TABLET, DELAYED RELEASE ORAL at 05:44

## 2024-10-15 RX ADMIN — Medication: at 09:43

## 2024-10-15 RX ADMIN — TRAZODONE HYDROCHLORIDE 200 MG: 50 TABLET ORAL at 22:00

## 2024-10-15 RX ADMIN — SODIUM CHLORIDE, PRESERVATIVE FREE 10 ML: 5 INJECTION INTRAVENOUS at 22:00

## 2024-10-15 RX ADMIN — SODIUM CHLORIDE, PRESERVATIVE FREE 10 ML: 5 INJECTION INTRAVENOUS at 22:01

## 2024-10-15 RX ADMIN — ROSUVASTATIN CALCIUM 10 MG: 10 TABLET, FILM COATED ORAL at 22:00

## 2024-10-15 RX ADMIN — INSULIN LISPRO 12 UNITS: 100 INJECTION, SOLUTION INTRAVENOUS; SUBCUTANEOUS at 18:07

## 2024-10-15 NOTE — PLAN OF CARE
Problem: Chronic Conditions and Co-morbidities  Goal: Patient's chronic conditions and co-morbidity symptoms are monitored and maintained or improved  Outcome: Progressing  Flowsheets (Taken 10/15/2024 0810)  Care Plan - Patient's Chronic Conditions and Co-Morbidity Symptoms are Monitored and Maintained or Improved:   Monitor and assess patient's chronic conditions and comorbid symptoms for stability, deterioration, or improvement   Collaborate with multidisciplinary team to address chronic and comorbid conditions and prevent exacerbation or deterioration   Update acute care plan with appropriate goals if chronic or comorbid symptoms are exacerbated and prevent overall improvement and discharge     Problem: Discharge Planning  Goal: Discharge to home or other facility with appropriate resources  Outcome: Progressing  Flowsheets (Taken 10/15/2024 0810)  Discharge to home or other facility with appropriate resources:   Identify barriers to discharge with patient and caregiver   Arrange for needed discharge resources and transportation as appropriate   Identify discharge learning needs (meds, wound care, etc)     Problem: Pain  Goal: Verbalizes/displays adequate comfort level or baseline comfort level  Outcome: Progressing  Flowsheets (Taken 10/15/2024 0819)  Verbalizes/displays adequate comfort level or baseline comfort level:   Encourage patient to monitor pain and request assistance   Assess pain using appropriate pain scale   Administer analgesics based on type and severity of pain and evaluate response   Implement non-pharmacological measures as appropriate and evaluate response   Consider cultural and social influences on pain and pain management   Notify Licensed Independent Practitioner if interventions unsuccessful or patient reports new pain     Problem: Safety - Adult  Goal: Free from fall injury  Outcome: Progressing  Flowsheets (Taken 10/15/2024 0810)  Free From Fall Injury: Instruct family/caregiver on

## 2024-10-15 NOTE — PROGRESS NOTES
Infectious Diseases Inpatient Progress Note      CHIEF COMPLAINT:     Left diabetic foot infection  Left great toe cellulitis  Left great toe osteomyelitis  MDRO infection  ESR elevation  CRP elevation    HISTORY OF PRESENT ILLNESS:  68 y.o. male with a significant history for diabetes, neuropathy, hypertension, hyperlipidemia, depression, history of pacemaker implantation admitted to hospital secondary to left great toe ulceration nonhealing wound associated cellulitis.  Patient noticed a blister on the medial aspect of the left great toe on 9/20/24, since then the blister progressed to open wound associated with cellulitis he was evaluated recently in the wound care center secondary to nonhealing left great toe ulceration.  Wound culture now positive for E. coli MDRO due to concern for worsening cellulitis now admitted to hospital was seen by  from podiatry.  Lactic 2 creatinine 0.7 CRP 35.5 LFT normal WBC normal ESR 41 wound culture now positive for E. coli, Enterobacter cloacae blood cultures negative MRI pending    Interval history: Left foot redness improving left great toe dressing intact seen by podiatry MRI with osteomyelitis  S/P picc LINE for IV abx and OPAT d/w pt  DAWSON updated  and there is a plan for Operative intervention    Past Medical History:    Past Medical History:   Diagnosis Date    Depression     Diabetes mellitus (HCC)     GERD (gastroesophageal reflux disease)     Hyperlipidemia     Hypertension     LBBB (left bundle branch block)     SVT (supraventricular tachycardia) (HCC)        Past Surgical History:    Past Surgical History:   Procedure Laterality Date    COLONOSCOPY      COLONOSCOPY N/A 3/1/2024    COLONOSCOPY POLYPECTOMY SNARE/COLD BIOPSY performed by Ambrocio Coffey MD at [facility] ENDOSCOPY    ENDOSCOPY, COLON, DIAGNOSTIC      EYE SURGERY Right 05/01/2023    PHACOEMULSIFICATION WITH CLAREON PANOPTIX TORIC INTRAOCULAR LENS IMPLANT - RIGHT EYE performed by Luiz Mancuso MD at [facility]  02/12/2021, 03/05/2021, 09/24/2021    COVID-19, PFIZER, (age 12y+), IM, 30mcg/0.3mL 02/06/2024         Social History:     Social History     Tobacco Use    Smoking status: Former     Types: Cigarettes    Smokeless tobacco: Never    Tobacco comments:     Quit age 16 smoked for 2 years    Vaping Use    Vaping status: Never Used   Substance Use Topics    Alcohol use: Yes     Comment: social    Drug use: No     Social History     Tobacco Use   Smoking Status Former    Types: Cigarettes   Smokeless Tobacco Never   Tobacco Comments    Quit age 16 smoked for 2 years       Family History   Problem Relation Age of Onset    Heart Disease Mother     Other Mother         pulmonary emboli    Diabetes Father           REVIEW OF SYSTEMS:     Constitutional:  negative for fevers, chills, night sweats  Eyes:  negative for blurred vision, eye discharge, visual disturbance   HEENT:  negative for hearing loss, ear drainage,nasal congestion  Respiratory:  negative for cough, shortness of breath or hemoptysis   Cardiovascular:  negative for chest pain, palpitations, syncope  Gastrointestinal:  negative for nausea, vomiting, diarrhea, constipation, abdominal pain  Genitourinary:  negative for frequency, dysuria, urinary incontinence, hematuria  Hematologic/Lymphatic:  negative for easy bruising, bleeding and lymphadenopathy  Allergic/Immunologic:  negative for recurrent infections, angioedema, anaphylaxis   Endocrine:  negative for weight changes, polyuria, polydipsia and polyphagia  Musculoskeletal: Left soft tissue loss with ulceration ulceration with cellulitis swelling, decreased range of motion  Integumentary: No rashes, skin lesions  Neurological:  negative for headaches, slurred speech, unilateral weakness  Psychiatric: negative for hallucinations,confusion,agitation.     PHYSICAL EXAM:      Vitals:    BP (!) 177/74 Comment: Blood pressure medications given  Pulse 76   Temp 97.7 °F (36.5 °C) (Oral)   Resp 17   Ht 1.854 m (6'

## 2024-10-15 NOTE — CARE COORDINATION
10/15 Plan: OR on 10/16 w/podiatry for L/Hallux Amputation. [facility]. Electronically signed by Celina Aguilar RN on 10/15/2024 at 10:11 AM

## 2024-10-15 NOTE — PROGRESS NOTES
Spiritual Health History and Assessment/Progress Note  [facility]    (P) Initial Encounter, Follow-up,  , (P) Life Adjustments,      Name: Tony Boyle MRN: [id number]    Age: 68 y.o.     Sex: male   Language: English   Holiness: Holiness   Left foot infection     Date: 10/15/2024            Total Time Calculated: (P) 8 min              Spiritual Assessment began in [facility]        Referral/Consult From: Rounding   Encounter Overview/Reason: (P) Initial Encounter, Follow-up  Service Provided For: (P) Patient and family together    Jessy, Belief, Meaning:   Patient identifies as spiritual, finds meaning in present moment    Community:  Patient feels well-supported. Support system includes: Children    Assessment and Plan of Care:   Patient Interventions include: Facilitated expression of thoughts and feelings and Explored spiritual coping/struggle/distress    Patient Plan of Care: Other: Spiritual care to follow up, to explore if pt needs emotional/spiritual support after surgery    Electronically signed by JENNY Licea on 10/15/2024 at 1:35 PM

## 2024-10-15 NOTE — PROGRESS NOTES
pacemaker check FINDINGS: Left subclavian dual lead pacer in place.  The cardiomediastinal silhouette is unchanged in appearance. There is no consolidation, pneumothorax, or evidence of edema. No effusion is appreciated. The osseous structures are unchanged in appearance.     Left subclavian dual chamber pacer.  No pneumothorax identified.     XR FOOT LEFT (MIN 3 VIEWS)    Result Date: 10/10/2024  EXAMINATION: THREE XRAY VIEWS OF THE LEFT FOOT 10/10/2024 11:04 am COMPARISON: None. HISTORY: ORDERING SYSTEM PROVIDED HISTORY: left great toe wound TECHNOLOGIST PROVIDED HISTORY: Reason for exam:->left great toe wound FINDINGS: There is some osteoarthritis of the ankle joint, with some osteophyte formation.  The rest of the foot appears unremarkable.  There is no evidence for any bony lysis to suggest osteomyelitis.  The 1st toe appears unremarkable.     1. No radiographic evidence for osteomyelitis. 2. Osteoarthritis of the ankle joint.       CBC:   Recent Labs     10/13/24  0529   WBC 7.1   HGB 13.5        BMP:    Recent Labs     10/13/24  0529 10/14/24  0540 10/15/24  0545    142 142   K 3.9 4.0 4.1    107 105   CO2 26 25 29   BUN 13 13 10   CREATININE 0.6* 0.6* 0.5*   GLUCOSE 133* 120* 159*     Hepatic: No results for input(s): \"AST\", \"ALT\", \"BILITOT\", \"ALKPHOS\" in the last 72 hours.    Invalid input(s): \"ALB\"  Lipids:   Lab Results   Component Value Date/Time    CHOL 149 01/21/2018 06:31 AM    HDL 50 01/21/2018 06:31 AM    TRIG 187 01/21/2018 06:31 AM     Hemoglobin A1C:   Lab Results   Component Value Date/Time    LABA1C 9.9 10/12/2024 05:06 AM     TSH: No results found for: \"TSH\"  Troponin: No results found for: \"TROPONINT\"  Lactic Acid: No results for input(s): \"LACTA\" in the last 72 hours.  BNP: No results for input(s): \"PROBNP\" in the last 72 hours.  UA:No results found for: \"NITRU\", \"COLORU\", \"PHUR\", \"LABCAST\", \"WBCUA\", \"RBCUA\", \"MUCUS\", \"TRICHOMONAS\", \"YEAST\", \"BACTERIA\", \"CLARITYU\",  \"SPECGRAV\", \"LEUKOCYTESUR\", \"UROBILINOGEN\", \"BILIRUBINUR\", \"BLOODU\", \"GLUCOSEU\", \"KETUA\", \"AMORPHOUS\"  Urine Cultures: No results found for: \"LABURIN\"  Blood Cultures:   Lab Results   Component Value Date/Time    BC No Growth after 4 days of incubation. 10/10/2024 11:19 AM     Lab Results   Component Value Date/Time    BLOODCULT2 No Growth after 4 days of incubation. 10/10/2024 11:33 AM     Organism:   Lab Results   Component Value Date/Time    ORG Escherichia coli 10/10/2024 11:18 AM    ORG Staphylococcus epidermidis 10/10/2024 11:18 AM    ORG Enterobacter cloacae complex 10/10/2024 11:18 AM         Electronically signed by Taye Nix MD on 10/15/2024 at 10:25 AM  Comment: Please note this report has been produced using speech recognition software and may contain errors related to that system including errors in grammar, punctuation, and spelling, as well as words and phrases that may be inappropriate. If there are any questions or concerns, please feel free to contact the dictating provider for clarification.

## 2024-10-15 NOTE — PROGRESS NOTES
Podiatric Surgery Daily Progress Note  Tony Boyle      Subjective :   Patient seen and examined this am at the bedside. Patient denies any acute overnight events.  Patient is wishing to pursue surgery at this time to remove bone infection.  Patient's family has long history of amputation up to and including above-the-knee amputations.  Patient does not have any sensation up to his knee.Patient denies N/V/F/C/SOB. Patient denies calf pain, thigh pain, chest pain.        Objective     BP (!) 177/74 Comment: Blood pressure medications given  Pulse 76   Temp 97.7 °F (36.5 °C) (Oral)   Resp 17   Ht 1.854 m (6' 1\") Comment: As per pt.  Wt 123.2 kg (271 lb 9.7 oz)   SpO2 96%   BMI 35.83 kg/m²      I/O:  Intake/Output Summary (Last 24 hours) at 10/15/2024 0948  Last data filed at 10/15/2024 0543  Gross per 24 hour   Intake 1985.86 ml   Output --   Net 1985.86 ml              Wt Readings from Last 3 Encounters:   10/15/24 123.2 kg (271 lb 9.7 oz)   10/07/24 122.2 kg (269 lb 6.4 oz)   03/01/24 113.4 kg (250 lb)       LABS:    Recent Labs     10/13/24  0529   WBC 7.1   HGB 13.5   HCT 40.6           Recent Labs     10/14/24  0540 10/15/24  0545    142   K 4.0 4.1    105   CO2 25 29   PHOS 2.1*  --    BUN 13 10   CREATININE 0.6* 0.5*      No results for input(s): \"INR\", \"APTT\" in the last 72 hours.    Invalid input(s): \"PROT\"        LOWER EXTREMITY EXAMINATION    Dressing to left LE intact. No strikethrough noted to the external dressing.  Serosanguineous drainage noted to the internal layers of the dressing.     VASCULAR: DP and PT pulses are palpable 1/4 B/L. CFT is sluggish to the digits of the foot b/l. Skin temperature is warm to cool from proximal to distal with focal calor noted to the hallux of the left foot. No edema noted. No pain with calf compression b/l.    NEUROLOGIC: Gross and epicritic sensation is absent b/l. Protective sensation is absent at all pedal sites b/l.    DERMATOLOGIC:

## 2024-10-15 NOTE — PLAN OF CARE
0945)  Skin Integrity Remains Intact: Monitor for areas of redness and/or skin breakdown  Goal: Incisions, wounds, or drain sites healing without S/S of infection  10/14/2024 2154 by Adriane Campbell RN  Outcome: Progressing  Flowsheets (Taken 10/14/2024 2153)  Incisions, Wounds, or Drain Sites Healing Without Sign and Symptoms of Infection: TWICE DAILY: Assess and document dressing/incision, wound bed, drain sites and surrounding tissue  10/14/2024 1245 by Jayne Hobson RN  Outcome: Progressing  Flowsheets (Taken 10/14/2024 0945)  Incisions, Wounds, or Drain Sites Healing Without Sign and Symptoms of Infection: TWICE DAILY: Assess and document skin integrity  Goal: Oral mucous membranes remain intact  10/14/2024 2154 by Adriane Campbell RN  Outcome: Progressing  Flowsheets (Taken 10/14/2024 2153)  Oral Mucous Membranes Remain Intact:   Assess oral mucosa and hygiene practices   Implement oral medicated treatments as ordered   Implement preventative oral hygiene regimen  10/14/2024 1245 by Jayne Hobson RN  Outcome: Progressing  Flowsheets (Taken 10/14/2024 0945)  Oral Mucous Membranes Remain Intact: Assess oral mucosa and hygiene practices     Problem: Metabolic/Fluid and Electrolytes - Adult  Goal: Electrolytes maintained within normal limits  10/14/2024 2154 by Adriane Campbell RN  Outcome: Progressing  10/14/2024 1245 by Jayne Hobson RN  Outcome: Progressing  Flowsheets (Taken 10/14/2024 0945)  Electrolytes maintained within normal limits:   Monitor labs and assess patient for signs and symptoms of electrolyte imbalances   Administer electrolyte replacement as ordered   Monitor response to electrolyte replacements, including repeat lab results as appropriate  Goal: Hemodynamic stability and optimal renal function maintained  10/14/2024 2154 by Adriane Campbell RN  Outcome: Progressing  10/14/2024 1245 by Jayne Hobson RN  Outcome: Progressing  Flowsheets (Taken 10/14/2024  Jayne Hobson, RN  Outcome: Progressing  Flowsheets (Taken 10/14/2024 0945)  Absence of fever/infection during anticipated neutropenic period: Monitor white blood cell count     Problem: Skin/Tissue Integrity  Goal: Absence of new skin breakdown  Description: 1.  Monitor for areas of redness and/or skin breakdown  2.  Assess vascular access sites hourly  3.  Every 4-6 hours minimum:  Change oxygen saturation probe site  4.  Every 4-6 hours:  If on nasal continuous positive airway pressure, respiratory therapy assess nares and determine need for appliance change or resting period.  10/14/2024 2154 by Adriane Campbell, RN  Outcome: Progressing  10/14/2024 1245 by Jayne Hobson, RN  Outcome: Progressing     Problem: Nutrition Deficit:  Goal: Optimize nutritional status  Outcome: Progressing

## 2024-10-16 ENCOUNTER — ANESTHESIA (OUTPATIENT)
Dept: OPERATING ROOM | Age: 69
End: 2024-10-16
Payer: MEDICARE

## 2024-10-16 ENCOUNTER — APPOINTMENT (OUTPATIENT)
Dept: GENERAL RADIOLOGY | Age: 69
End: 2024-10-16
Payer: MEDICARE

## 2024-10-16 LAB
ANION GAP SERPL CALCULATED.3IONS-SCNC: 8 MMOL/L (ref 3–16)
BUN SERPL-MCNC: 15 MG/DL (ref 7–20)
CALCIUM SERPL-MCNC: 9.5 MG/DL (ref 8.3–10.6)
CHLORIDE SERPL-SCNC: 104 MMOL/L (ref 99–110)
CO2 SERPL-SCNC: 31 MMOL/L (ref 21–32)
CREAT SERPL-MCNC: 0.6 MG/DL (ref 0.8–1.3)
CRP SERPL-MCNC: 10.8 MG/L (ref 0–5.1)
GFR SERPLBLD CREATININE-BSD FMLA CKD-EPI: >90 ML/MIN/{1.73_M2}
GLUCOSE BLD-MCNC: 115 MG/DL (ref 70–99)
GLUCOSE BLD-MCNC: 117 MG/DL (ref 70–99)
GLUCOSE BLD-MCNC: 138 MG/DL (ref 70–99)
GLUCOSE BLD-MCNC: 143 MG/DL (ref 70–99)
GLUCOSE BLD-MCNC: 151 MG/DL (ref 70–99)
GLUCOSE BLD-MCNC: 167 MG/DL (ref 70–99)
GLUCOSE BLD-MCNC: 204 MG/DL (ref 70–99)
GLUCOSE SERPL-MCNC: 134 MG/DL (ref 70–99)
PERFORMED ON: ABNORMAL
POTASSIUM SERPL-SCNC: 4.2 MMOL/L (ref 3.5–5.1)
SODIUM SERPL-SCNC: 143 MMOL/L (ref 136–145)

## 2024-10-16 PROCEDURE — 2580000003 HC RX 258: Performed by: INTERNAL MEDICINE

## 2024-10-16 PROCEDURE — 6360000002 HC RX W HCPCS: Performed by: INTERNAL MEDICINE

## 2024-10-16 PROCEDURE — 6360000002 HC RX W HCPCS

## 2024-10-16 PROCEDURE — 2709999900 HC NON-CHARGEABLE SUPPLY: Performed by: PODIATRIST

## 2024-10-16 PROCEDURE — 0Y6Q0Z1 DETACHMENT AT LEFT 1ST TOE, HIGH, OPEN APPROACH: ICD-10-PCS | Performed by: PODIATRIST

## 2024-10-16 PROCEDURE — 2500000003 HC RX 250 WO HCPCS

## 2024-10-16 PROCEDURE — 1200000000 HC SEMI PRIVATE

## 2024-10-16 PROCEDURE — 6360000002 HC RX W HCPCS: Performed by: PODIATRIST

## 2024-10-16 PROCEDURE — 7100000000 HC PACU RECOVERY - FIRST 15 MIN: Performed by: PODIATRIST

## 2024-10-16 PROCEDURE — 7100000001 HC PACU RECOVERY - ADDTL 15 MIN: Performed by: PODIATRIST

## 2024-10-16 PROCEDURE — 90686 IIV4 VACC NO PRSV 0.5 ML IM: CPT

## 2024-10-16 PROCEDURE — 6370000000 HC RX 637 (ALT 250 FOR IP): Performed by: PODIATRIST

## 2024-10-16 PROCEDURE — 6360000002 HC RX W HCPCS: Performed by: FAMILY MEDICINE

## 2024-10-16 PROCEDURE — 2580000003 HC RX 258

## 2024-10-16 PROCEDURE — 3600000003 HC SURGERY LEVEL 3 BASE: Performed by: PODIATRIST

## 2024-10-16 PROCEDURE — A4217 STERILE WATER/SALINE, 500 ML: HCPCS | Performed by: PODIATRIST

## 2024-10-16 PROCEDURE — 3700000001 HC ADD 15 MINUTES (ANESTHESIA): Performed by: PODIATRIST

## 2024-10-16 PROCEDURE — 94150 VITAL CAPACITY TEST: CPT

## 2024-10-16 PROCEDURE — 6370000000 HC RX 637 (ALT 250 FOR IP): Performed by: INTERNAL MEDICINE

## 2024-10-16 PROCEDURE — 6370000000 HC RX 637 (ALT 250 FOR IP)

## 2024-10-16 PROCEDURE — G0008 ADMIN INFLUENZA VIRUS VAC: HCPCS

## 2024-10-16 PROCEDURE — 73630 X-RAY EXAM OF FOOT: CPT

## 2024-10-16 PROCEDURE — 99233 SBSQ HOSP IP/OBS HIGH 50: CPT | Performed by: INTERNAL MEDICINE

## 2024-10-16 PROCEDURE — 86140 C-REACTIVE PROTEIN: CPT

## 2024-10-16 PROCEDURE — 6370000000 HC RX 637 (ALT 250 FOR IP): Performed by: FAMILY MEDICINE

## 2024-10-16 PROCEDURE — 3700000000 HC ANESTHESIA ATTENDED CARE: Performed by: PODIATRIST

## 2024-10-16 PROCEDURE — 2580000003 HC RX 258: Performed by: PODIATRIST

## 2024-10-16 PROCEDURE — 3600000013 HC SURGERY LEVEL 3 ADDTL 15MIN: Performed by: PODIATRIST

## 2024-10-16 PROCEDURE — 80048 BASIC METABOLIC PNL TOTAL CA: CPT

## 2024-10-16 PROCEDURE — 88305 TISSUE EXAM BY PATHOLOGIST: CPT

## 2024-10-16 RX ORDER — ENOXAPARIN SODIUM 100 MG/ML
30 INJECTION SUBCUTANEOUS 2 TIMES DAILY
Status: DISCONTINUED | OUTPATIENT
Start: 2024-10-17 | End: 2024-10-19 | Stop reason: HOSPADM

## 2024-10-16 RX ORDER — LIDOCAINE HYDROCHLORIDE 20 MG/ML
INJECTION, SOLUTION EPIDURAL; INFILTRATION; INTRACAUDAL; PERINEURAL
Status: DISCONTINUED | OUTPATIENT
Start: 2024-10-16 | End: 2024-10-16 | Stop reason: SDUPTHER

## 2024-10-16 RX ORDER — SODIUM CHLORIDE 9 MG/ML
INJECTION, SOLUTION INTRAVENOUS PRN
Status: DISCONTINUED | OUTPATIENT
Start: 2024-10-16 | End: 2024-10-16 | Stop reason: HOSPADM

## 2024-10-16 RX ORDER — NALOXONE HYDROCHLORIDE 0.4 MG/ML
INJECTION, SOLUTION INTRAMUSCULAR; INTRAVENOUS; SUBCUTANEOUS PRN
Status: DISCONTINUED | OUTPATIENT
Start: 2024-10-16 | End: 2024-10-16 | Stop reason: HOSPADM

## 2024-10-16 RX ORDER — FENTANYL CITRATE 0.05 MG/ML
50 INJECTION, SOLUTION INTRAMUSCULAR; INTRAVENOUS EVERY 5 MIN PRN
Status: DISCONTINUED | OUTPATIENT
Start: 2024-10-16 | End: 2024-10-16 | Stop reason: HOSPADM

## 2024-10-16 RX ORDER — PROPOFOL 10 MG/ML
INJECTION, EMULSION INTRAVENOUS
Status: DISCONTINUED | OUTPATIENT
Start: 2024-10-16 | End: 2024-10-16 | Stop reason: SDUPTHER

## 2024-10-16 RX ORDER — MAGNESIUM HYDROXIDE 1200 MG/15ML
LIQUID ORAL CONTINUOUS PRN
Status: COMPLETED | OUTPATIENT
Start: 2024-10-16 | End: 2024-10-16

## 2024-10-16 RX ORDER — MIDAZOLAM HYDROCHLORIDE 1 MG/ML
INJECTION INTRAMUSCULAR; INTRAVENOUS
Status: DISCONTINUED | OUTPATIENT
Start: 2024-10-16 | End: 2024-10-16 | Stop reason: SDUPTHER

## 2024-10-16 RX ORDER — SODIUM CHLORIDE 0.9 % (FLUSH) 0.9 %
5-40 SYRINGE (ML) INJECTION PRN
Status: DISCONTINUED | OUTPATIENT
Start: 2024-10-16 | End: 2024-10-16 | Stop reason: HOSPADM

## 2024-10-16 RX ORDER — HYDRALAZINE HYDROCHLORIDE 20 MG/ML
5 INJECTION INTRAMUSCULAR; INTRAVENOUS ONCE
Status: COMPLETED | OUTPATIENT
Start: 2024-10-16 | End: 2024-10-16

## 2024-10-16 RX ORDER — SODIUM CHLORIDE 0.9 % (FLUSH) 0.9 %
5-40 SYRINGE (ML) INJECTION EVERY 12 HOURS SCHEDULED
Status: DISCONTINUED | OUTPATIENT
Start: 2024-10-16 | End: 2024-10-16 | Stop reason: HOSPADM

## 2024-10-16 RX ORDER — FENTANYL CITRATE 0.05 MG/ML
25 INJECTION, SOLUTION INTRAMUSCULAR; INTRAVENOUS EVERY 5 MIN PRN
Status: DISCONTINUED | OUTPATIENT
Start: 2024-10-16 | End: 2024-10-16 | Stop reason: HOSPADM

## 2024-10-16 RX ORDER — GLYCOPYRROLATE 0.2 MG/ML
INJECTION INTRAMUSCULAR; INTRAVENOUS
Status: DISCONTINUED | OUTPATIENT
Start: 2024-10-16 | End: 2024-10-16 | Stop reason: SDUPTHER

## 2024-10-16 RX ORDER — IPRATROPIUM BROMIDE AND ALBUTEROL SULFATE 2.5; .5 MG/3ML; MG/3ML
1 SOLUTION RESPIRATORY (INHALATION)
Status: DISCONTINUED | OUTPATIENT
Start: 2024-10-16 | End: 2024-10-16 | Stop reason: HOSPADM

## 2024-10-16 RX ORDER — LABETALOL HYDROCHLORIDE 5 MG/ML
10 INJECTION, SOLUTION INTRAVENOUS
Status: DISCONTINUED | OUTPATIENT
Start: 2024-10-16 | End: 2024-10-16 | Stop reason: HOSPADM

## 2024-10-16 RX ORDER — FENTANYL CITRATE 50 UG/ML
INJECTION, SOLUTION INTRAMUSCULAR; INTRAVENOUS
Status: DISCONTINUED | OUTPATIENT
Start: 2024-10-16 | End: 2024-10-16 | Stop reason: SDUPTHER

## 2024-10-16 RX ORDER — HYDRALAZINE HYDROCHLORIDE 20 MG/ML
10 INJECTION INTRAMUSCULAR; INTRAVENOUS
Status: DISCONTINUED | OUTPATIENT
Start: 2024-10-16 | End: 2024-10-16 | Stop reason: HOSPADM

## 2024-10-16 RX ORDER — ONDANSETRON 2 MG/ML
4 INJECTION INTRAMUSCULAR; INTRAVENOUS
Status: DISCONTINUED | OUTPATIENT
Start: 2024-10-16 | End: 2024-10-16 | Stop reason: HOSPADM

## 2024-10-16 RX ORDER — PROCHLORPERAZINE EDISYLATE 5 MG/ML
5 INJECTION INTRAMUSCULAR; INTRAVENOUS
Status: DISCONTINUED | OUTPATIENT
Start: 2024-10-16 | End: 2024-10-16 | Stop reason: HOSPADM

## 2024-10-16 RX ADMIN — PANTOPRAZOLE SODIUM 40 MG: 40 TABLET, DELAYED RELEASE ORAL at 05:48

## 2024-10-16 RX ADMIN — PROPRANOLOL HYDROCHLORIDE 40 MG: 40 TABLET ORAL at 08:16

## 2024-10-16 RX ADMIN — INFLUENZA A VIRUS A/VICTORIA/2454/2019 IVR-207 (H1N1) ANTIGEN (PROPIOLACTONE INACTIVATED), INFLUENZA A VIRUS A/HONG KONG/2671/2019 IVR-208 (H3N2) ANTIGEN (PROPIOLACTONE INACTIVATED), INFLUENZA B VIRUS B/VICTORIA/705/2018 BVR-11 ANTIGEN (PROPIOLACTONE INACTIVATED), INFLUENZA B VIRUS B/PHUKET/3073/2013 BVR-1B ANTIGEN (PROPIOLACTONE INACTIVATED) 0.5 ML: 15; 15; 15; 15 INJECTION, SUSPENSION INTRAMUSCULAR at 14:24

## 2024-10-16 RX ADMIN — PROPOFOL 60 MG: 10 INJECTION, EMULSION INTRAVENOUS at 12:02

## 2024-10-16 RX ADMIN — LIDOCAINE HYDROCHLORIDE 100 MG: 20 INJECTION, SOLUTION EPIDURAL; INFILTRATION; INTRACAUDAL; PERINEURAL at 12:02

## 2024-10-16 RX ADMIN — LISINOPRIL 40 MG: 40 TABLET ORAL at 08:17

## 2024-10-16 RX ADMIN — GLYCOPYRROLATE 0.2 MG: 0.2 INJECTION, SOLUTION INTRAMUSCULAR; INTRAVENOUS at 12:02

## 2024-10-16 RX ADMIN — FENTANYL CITRATE 25 MCG: 50 INJECTION INTRAMUSCULAR; INTRAVENOUS at 12:03

## 2024-10-16 RX ADMIN — INSULIN LISPRO 4 UNITS: 100 INJECTION, SOLUTION INTRAVENOUS; SUBCUTANEOUS at 18:11

## 2024-10-16 RX ADMIN — SODIUM CHLORIDE, PRESERVATIVE FREE 10 ML: 5 INJECTION INTRAVENOUS at 22:19

## 2024-10-16 RX ADMIN — ROSUVASTATIN CALCIUM 10 MG: 10 TABLET, FILM COATED ORAL at 22:18

## 2024-10-16 RX ADMIN — SODIUM CHLORIDE: 9 INJECTION, SOLUTION INTRAVENOUS at 11:56

## 2024-10-16 RX ADMIN — PROPOFOL 160 MCG/KG/MIN: 10 INJECTION, EMULSION INTRAVENOUS at 12:03

## 2024-10-16 RX ADMIN — MEROPENEM 1000 MG: 1 INJECTION INTRAVENOUS at 02:30

## 2024-10-16 RX ADMIN — MEROPENEM 1000 MG: 1 INJECTION INTRAVENOUS at 18:16

## 2024-10-16 RX ADMIN — VENLAFAXINE HYDROCHLORIDE 150 MG: 75 CAPSULE, EXTENDED RELEASE ORAL at 08:16

## 2024-10-16 RX ADMIN — SODIUM CHLORIDE, PRESERVATIVE FREE 10 ML: 5 INJECTION INTRAVENOUS at 08:20

## 2024-10-16 RX ADMIN — TRAZODONE HYDROCHLORIDE 200 MG: 50 TABLET ORAL at 22:19

## 2024-10-16 RX ADMIN — HYDRALAZINE HYDROCHLORIDE 5 MG: 20 INJECTION INTRAMUSCULAR; INTRAVENOUS at 14:23

## 2024-10-16 RX ADMIN — PROPRANOLOL HYDROCHLORIDE 40 MG: 40 TABLET ORAL at 22:18

## 2024-10-16 RX ADMIN — CLONAZEPAM 1 MG: 0.5 TABLET ORAL at 22:19

## 2024-10-16 RX ADMIN — MEROPENEM 1000 MG: 1 INJECTION INTRAVENOUS at 10:20

## 2024-10-16 RX ADMIN — MIDAZOLAM 1 MG: 1 INJECTION INTRAMUSCULAR; INTRAVENOUS at 11:56

## 2024-10-16 ASSESSMENT — PAIN DESCRIPTION - DESCRIPTORS
DESCRIPTORS: TINGLING
DESCRIPTORS: TINGLING;NUMBNESS;BURNING
DESCRIPTORS: TINGLING;NUMBNESS;BURNING

## 2024-10-16 ASSESSMENT — PAIN DESCRIPTION - ONSET: ONSET: ON-GOING

## 2024-10-16 ASSESSMENT — PAIN DESCRIPTION - ORIENTATION
ORIENTATION: RIGHT;LEFT
ORIENTATION: LEFT

## 2024-10-16 ASSESSMENT — PAIN DESCRIPTION - FREQUENCY: FREQUENCY: CONTINUOUS

## 2024-10-16 ASSESSMENT — PAIN SCALES - GENERAL
PAINLEVEL_OUTOF10: 0
PAINLEVEL_OUTOF10: 5
PAINLEVEL_OUTOF10: 0
PAINLEVEL_OUTOF10: 6

## 2024-10-16 ASSESSMENT — LIFESTYLE VARIABLES: SMOKING_STATUS: 0

## 2024-10-16 ASSESSMENT — PAIN DESCRIPTION - LOCATION
LOCATION: FOOT
LOCATION: FOOT

## 2024-10-16 ASSESSMENT — ENCOUNTER SYMPTOMS: SHORTNESS OF BREATH: 0

## 2024-10-16 ASSESSMENT — PAIN - FUNCTIONAL ASSESSMENT
PAIN_FUNCTIONAL_ASSESSMENT: ACTIVITIES ARE NOT PREVENTED
PAIN_FUNCTIONAL_ASSESSMENT: ACTIVITIES ARE NOT PREVENTED
PAIN_FUNCTIONAL_ASSESSMENT: 0-10

## 2024-10-16 NOTE — PLAN OF CARE
Problem: Chronic Conditions and Co-morbidities  Goal: Patient's chronic conditions and co-morbidity symptoms are monitored and maintained or improved  10/16/2024 1115 by Gita Briones RN  Outcome: Progressing  10/15/2024 2346 by Adriane Campbell RN  Outcome: Progressing     Problem: Discharge Planning  Goal: Discharge to home or other facility with appropriate resources  10/16/2024 1115 by Gita Briones RN  Outcome: Progressing  10/15/2024 2346 by Adriane Campbell RN  Outcome: Progressing     Problem: Pain  Goal: Verbalizes/displays adequate comfort level or baseline comfort level  10/16/2024 1115 by Gita Briones RN  Outcome: Progressing  10/15/2024 2346 by Adriane Campbell RN  Outcome: Progressing     Problem: Safety - Adult  Goal: Free from fall injury  10/16/2024 1115 by Gita Briones RN  Outcome: Progressing  10/15/2024 2346 by Adriane Campbell RN  Outcome: Progressing     Problem: ABCDS Injury Assessment  Goal: Absence of physical injury  10/16/2024 1115 by Gita Briones RN  Outcome: Progressing  10/15/2024 2346 by Adriane Campbell RN  Outcome: Progressing     Problem: Cardiovascular - Adult  Goal: Maintains optimal cardiac output and hemodynamic stability  10/16/2024 1115 by Gita Briones RN  Outcome: Progressing  10/15/2024 2346 by Adriane Campbell RN  Outcome: Progressing  Goal: Absence of cardiac dysrhythmias or at baseline  10/16/2024 1115 by Gita Briones RN  Outcome: Progressing  10/15/2024 2346 by Adriane Campbell RN  Outcome: Progressing     Problem: Skin/Tissue Integrity - Adult  Goal: Skin integrity remains intact  10/16/2024 1115 by Gita Briones RN  Outcome: Progressing  10/15/2024 2346 by Adriane Campbell RN  Outcome: Progressing  Flowsheets (Taken 10/15/2024 2345)  Skin Integrity Remains Intact:   Monitor for areas of redness and/or skin breakdown   Assess vascular access sites hourly  Goal: Incisions, wounds, or drain sites healing  without S/S of infection  10/16/2024 1115 by Gita Briones RN  Outcome: Progressing  10/15/2024 2346 by Adriane Campbell RN  Outcome: Progressing  Flowsheets (Taken 10/15/2024 2345)  Incisions, Wounds, or Drain Sites Healing Without Sign and Symptoms of Infection: TWICE DAILY: Assess and document dressing/incision, wound bed, drain sites and surrounding tissue  Goal: Oral mucous membranes remain intact  10/16/2024 1115 by Gita Briones RN  Outcome: Progressing  10/15/2024 2346 by Adriane Campbell RN  Outcome: Progressing  Flowsheets (Taken 10/15/2024 2345)  Oral Mucous Membranes Remain Intact:   Assess oral mucosa and hygiene practices   Implement preventative oral hygiene regimen   Implement oral medicated treatments as ordered     Problem: Metabolic/Fluid and Electrolytes - Adult  Goal: Electrolytes maintained within normal limits  10/16/2024 1115 by Gita Briones RN  Outcome: Progressing  10/15/2024 2346 by Adriane Campbell RN  Outcome: Progressing  Goal: Hemodynamic stability and optimal renal function maintained  10/16/2024 1115 by Gita Briones RN  Outcome: Progressing  10/15/2024 2346 by Adriane Campbell RN  Outcome: Progressing  Goal: Glucose maintained within prescribed range  10/16/2024 1115 by Gita Briones RN  Outcome: Progressing  10/15/2024 2346 by Adriane Campbell RN  Outcome: Progressing     Problem: Infection - Adult  Goal: Absence of infection at discharge  10/16/2024 1115 by Gita Briones RN  Outcome: Progressing  10/15/2024 2346 by Adriane Campbell RN  Outcome: Progressing  Goal: Absence of infection during hospitalization  10/16/2024 1115 by Gita Briones RN  Outcome: Progressing  10/15/2024 2346 by Adriane Campbell RN  Outcome: Progressing  Goal: Absence of fever/infection during anticipated neutropenic period  10/16/2024 1115 by Gita Briones RN  Outcome: Progressing  10/15/2024 2346 by Adriane Campbell RN  Outcome: Progressing

## 2024-10-16 NOTE — PROGRESS NOTES
Pt returned to room 4131 from PACU. Oriented to room, call light, and floor policies.  Plan of care reviewed with patient/family.  Pt is resting in bed and no pain at this time; no s/s of distress noted. Assessment completed; bp 175/83, pt denies any symptoms, notified Dr. Nix. New orders placed for Hydralazine 5 mg IV once, administered and bp 133/76. Pt rates a medium fall risk; bed alarm deferred. Safety precautions in place; call light and bedside table within reach.  Pt encouraged to call for needs or ambulation.  Pt VU.  Will continue to monitor.  Electronically signed by Gita Mayer RN on 10/16/2024 at 2:02 PM

## 2024-10-16 NOTE — PROGRESS NOTES
V2.0    [facility] Progress Note      Name:  Tony Boyle /Age/Sex: 1955  (68 y.o. male)   MRN & CSN:  [id number] & [id number] Encounter Date/Time: 10/16/2024 11:00 AM EDT   Location:  [id number] PCP: Hebert Bolivar MD     Attending:Taye Nix MD       Hospital Day: 7    Assessment and Recommendations   Tony Boyle is a 68 y.o. male with pmh of diabetes, GERD, depression, HLD, HTN, who presents with Left foot infection    Patient was examined this morning.  Patient feels well.  Continues to be on IV antibiotics.    Nursing staff at the bedside giving overnight events  Patient was found to have Osteomyelitis involving the 1st digit distal phalanx   Plan is for the OR later today for the removal of the bone infection of the left great toe due to osteomyelitis.  Patient is in agreement to the plan  Infectious ease on board          Plan:   Cellulitis of left foot  Culture growing MDRO E. coli, staph epi, Enterobacter Cloacae  Patient is currently on vancomycin and meropenem IV  Podiatry on board  Infectious disease on board    2.  Diabetes mellitus with neuropathy  Hemoglobin A1c 9.9 on presentation  Insulin sliding scale 3 times daily with meals    3.  HDL  Patient is currently on rosuvastatin    4.  Left great toe ulcer  Follows up with podiatry Dr. Bruno  Podiatry on board    5.  GERD  Protonix 40 mg daily    6.  Anxiety and depression  Patient currently on clonazepam 1 mg and trazodone 200 mg at bedtime    Diet Diet NPO   DVT Prophylaxis [x] Lovenox, []  Heparin, [] SCDs, [x] Ambulation,  [] Eliquis, [] Xarelto  [] Coumadin   Code Status Full Code   Disposition From: Home  Expected Disposition: Home versus SNF  Estimated Date of Discharge: 2-3 days  Patient requires continued admission due to upon clinical improvement and surgical evaluation by podiatry   Surrogate Decision Maker/ POA       Personally reviewed Lab Studies and Imaging       Medical Decision Making:  The following  5-40 mL IntraVENous 2 times per day    lisinopril  40 mg Oral Daily    venlafaxine  150 mg Oral Daily with breakfast    nitroGLYCERIN  1 patch TransDERmal Daily    meropenem  1,000 mg IntraVENous Q8H    collagenase   Topical Daily    sodium chloride flush  5-40 mL IntraVENous 2 times per day    [Held by provider] enoxaparin  30 mg SubCUTAneous BID    clonazePAM  1 mg Oral Nightly    pantoprazole  40 mg Oral QAM AC    propranolol  40 mg Oral BID    rosuvastatin  10 mg Oral Nightly    traZODone  200 mg Oral Nightly    insulin lispro  0-16 Units SubCUTAneous 4x Daily AC & HS      Infusions:    sodium chloride      sodium chloride Stopped (10/15/24 0544)    dextrose       PRN Meds: sodium chloride flush, 5-40 mL, PRN  sodium chloride, , PRN  sodium chloride flush, 5-40 mL, PRN  sodium chloride, , PRN  potassium chloride, 40 mEq, PRN   Or  potassium alternative oral replacement, 40 mEq, PRN   Or  potassium chloride, 10 mEq, PRN  magnesium sulfate, 2,000 mg, PRN  ondansetron, 4 mg, Q8H PRN   Or  ondansetron, 4 mg, Q6H PRN  polyethylene glycol, 17 g, Daily PRN  acetaminophen, 650 mg, Q6H PRN   Or  acetaminophen, 650 mg, Q6H PRN  glucose, 4 tablet, PRN  dextrose bolus, 125 mL, PRN   Or  dextrose bolus, 250 mL, PRN  glucagon (rDNA), 1 mg, PRN  dextrose, , Continuous PRN        Labs and Imaging   XR CHEST PORTABLE    Result Date: 10/10/2024  EXAMINATION: ONE XRAY VIEW OF THE CHEST 10/10/2024 3:29 pm COMPARISON: 01/18/2018 HISTORY: ORDERING SYSTEM PROVIDED HISTORY: pacemaker check TECHNOLOGIST PROVIDED HISTORY: Reason for exam:->pacemaker check Reason for Exam: pacemaker check FINDINGS: Left subclavian dual lead pacer in place.  The cardiomediastinal silhouette is unchanged in appearance. There is no consolidation, pneumothorax, or evidence of edema. No effusion is appreciated. The osseous structures are unchanged in appearance.     Left subclavian dual chamber pacer.  No pneumothorax identified.     XR FOOT LEFT (MIN 3

## 2024-10-16 NOTE — BRIEF OP NOTE
Brief Postoperative Note      Patient: Tony Boyle  YOB: 1955  MRN: [id number]    Date of Procedure: 10/16/2024    Pre-Op Diagnosis Codes:      * Acute osteomyelitis-ankle/foot, left [M86.172]    Post-Op Diagnosis: Same       Procedure(s):  PARTIAL HALLUX AMPUTATION LEFT FOOT    Surgeon(s):  Jimbo Bruno DPM    Assistant:  Resident: Anny Castellanos PGY2    Anesthesia: Monitor Anesthesia Care    Estimated Blood Loss (mL): Minimal    Hemostasis: anatomic dissection and electrocautery, pneumatic ankle tourniquet placed but not inflated     Injectables: Pre-Op 10 cc of 2% Polocaine plain    Materials: 2-0 Vicryl, 2-0, 3-0 Nylon    Implants: None      Complications: None    Specimens:   ID Type Source Tests Collected by Time Destination   A : A. DISTAL LEFT GREAT TOE Specimen Toe SURGICAL PATHOLOGY Jimbo Bruno DPM 10/16/2024 1217        Drains: None    Findings:  Infection Present At Time Of Surgery (PATOS) (choose all levels that have infection present):  - Organ Space infection (below fascia) present as evidenced by osteomyelitis  Other Findings: Ulceration to the medial aspect of the hallux of the left foot at the time of surgery. No further signs of infection appreciated. Incision was closed fully. The head of the proximal phalanx was hard, white and shiny consistent with healthy bone.    DISPO: S/P Left foot partial hallux amputation. Recommend patient continue on IV antibiotics per ID. No further signs of infection. Bone was hard and healthy. Distal phalanx sent to pathology. Patient may be heel weightbearing to left foot in surgical shoe. No further surgical intervention at this time.    Electronically signed by Anny Castellanos DPM on 10/16/2024 at 12:53 PM

## 2024-10-16 NOTE — PROGRESS NOTES
4 Eyes Skin Assessment     NAME:  Tony Boyle  YOB: 1955  MEDICAL RECORD NUMBER:  [id number]    The patient is being assessed for  Post-Op Surgical    I agree that at least one RN has performed a thorough Head to Toe Skin Assessment on the patient. ALL assessment sites listed below have been assessed.      Areas assessed by both nurses:    Head, Face, Ears, Shoulders, Back, Chest, Arms, Elbows, Hands, Sacrum. Buttock, Coccyx, Ischium, Legs. Feet and Heels, and Under Medical Devices         Does the Patient have a Wound? Yes wound(s) were present on assessment. LDA wound assessment was Initiated and completed by RN       Juaquin Prevention initiated by RN: Yes  Wound Care Orders initiated by RN: Yes    Pressure Injury (Stage 3,4, Unstageable, DTI, NWPT, and Complex wounds) if present, place Wound referral order by RN under : No    New Ostomies, if present place, Ostomy referral order under : No     Nurse 1 eSignature: Electronically signed by Gita Mayer RN on 10/16/24 at 2:03 PM EDT    **SHARE this note so that the co-signing nurse can place an eSignature**    Nurse 2 eSignature: Electronically signed by Justin N. Schoenung, RN on 10/16/24 at 5:06 PM EDT

## 2024-10-16 NOTE — ANESTHESIA PRE PROCEDURE
(NPO overnight. Mr. Boyle denies active nausea / reflux. Possible conversion to general discussed.)        Anesthetic plan and risks discussed with patient.      Plan discussed with CRNA.            This pre-anesthesia assessment may be used as a history and physical.    DOS STAFF ADDENDUM:    Pt seen and examined, chart reviewed (including anesthesia, drug and allergy history).  No interval changes to history and physical examination.  Anesthetic plan, risks, benefits, alternatives, and personnel involved discussed with patient.  Patient verbalized an understanding and agrees to proceed.      Christian Olea MD  October 16, 2024  11:50 AM

## 2024-10-16 NOTE — PROGRESS NOTES
Pre-Operative Note  Resident Note     Patient: Tony Boyle      Procedure: Amputation left great toe    Consent: In chart     Labs:      No results for input(s): \"WBC\", \"HGB\", \"HCT\", \"MCV\", \"PLT\" in the last 72 hours.     Recent Labs     10/14/24  0540 10/15/24  0545 10/16/24  0600    142 143   K 4.0 4.1 4.2    105 104   CO2 25 29 31   PHOS 2.1*  --   --    BUN 13 10 15   CREATININE 0.6* 0.5* 0.6*      No results for input(s): \"AST\", \"ALT\", \"BILIDIR\", \"BILITOT\", \"ALKPHOS\" in the last 72 hours.    Invalid input(s): \"ALB\"   No results for input(s): \"LIPASE\", \"AMYLASE\" in the last 72 hours.     Recent Labs     10/15/24  1430   INR 0.89      No results for input(s): \"CKTOTAL\", \"CKMB\", \"CKMBINDEX\", \"TROPONINI\" in the last 72 hours.    Saline lock/IV access: Yes    Clearance for surgery: Yes per medicine     Diet: NPO at midnight    CXR: normal      EKG: See cardiology section of EMR for details description of EKG done on 10/14    Echocardiogram: not done    PT/INR: 12.2/0.89    IVF: saline lock     Abx: Scheduled meropenem    Type and Screen: Not indicated (Hg > 10)    Known risk factors for perioperative complications: Diabetes mellitus  LBBB, cardiomyopathy       Anesthesia to see patient    Anny Castellanos DPM   Podiatric Resident PGY2  [place]  Pager number [phone]  10/16/2024, 10:57 AM

## 2024-10-16 NOTE — PLAN OF CARE
social influences on pain and pain management   Notify Licensed Independent Practitioner if interventions unsuccessful or patient reports new pain     Problem: Safety - Adult  Goal: Free from fall injury  10/15/2024 2346 by Adriane Campbell RN  Outcome: Progressing  10/15/2024 1311 by Jayne Hobson RN  Outcome: Progressing  Flowsheets (Taken 10/15/2024 0810)  Free From Fall Injury: Instruct family/caregiver on patient safety     Problem: ABCDS Injury Assessment  Goal: Absence of physical injury  10/15/2024 2346 by Adriane Campbell RN  Outcome: Progressing  10/15/2024 1311 by Jayne Hobson RN  Outcome: Progressing  Flowsheets (Taken 10/15/2024 0810)  Absence of Physical Injury: Implement safety measures based on patient assessment     Problem: Cardiovascular - Adult  Goal: Maintains optimal cardiac output and hemodynamic stability  10/15/2024 2346 by Adriane Campbell RN  Outcome: Progressing  10/15/2024 1311 by Jayne Hobson RN  Outcome: Progressing  Flowsheets (Taken 10/15/2024 0810)  Maintains optimal cardiac output and hemodynamic stability:   Monitor blood pressure and heart rate   Assess for signs of decreased cardiac output  Goal: Absence of cardiac dysrhythmias or at baseline  10/15/2024 2346 by Adriane Campbell RN  Outcome: Progressing  10/15/2024 1311 by Jayne Hobson RN  Outcome: Progressing     Problem: Skin/Tissue Integrity - Adult  Goal: Skin integrity remains intact  10/15/2024 2346 by Adriane Campbell RN  Outcome: Progressing  Flowsheets (Taken 10/15/2024 2345)  Skin Integrity Remains Intact:   Monitor for areas of redness and/or skin breakdown   Assess vascular access sites hourly  10/15/2024 1311 by Jayne Hobson RN  Outcome: Progressing  Flowsheets (Taken 10/15/2024 0810)  Skin Integrity Remains Intact: Monitor for areas of redness and/or skin breakdown  Goal: Incisions, wounds, or drain sites healing without S/S of infection  10/15/2024 2346 by Adriane Campbell  within prescribed range  10/15/2024 2346 by Adriane Campbell RN  Outcome: Progressing  10/15/2024 1311 by Jayne Hobson RN  Outcome: Progressing  Flowsheets (Taken 10/15/2024 0810)  Glucose maintained within prescribed range:   Monitor blood glucose as ordered   Assess for signs and symptoms of hyperglycemia and hypoglycemia   Administer ordered medications to maintain glucose within target range     Problem: Infection - Adult  Goal: Absence of infection at discharge  10/15/2024 2346 by Adriane Campbell RN  Outcome: Progressing  10/15/2024 1311 by Jayne Hobson RN  Outcome: Progressing  Flowsheets (Taken 10/15/2024 0810)  Absence of infection at discharge:   Assess and monitor for signs and symptoms of infection   Monitor lab/diagnostic results   Administer medications as ordered   Instruct and encourage patient and family to use good hand hygiene technique   Identify and instruct in appropriate isolation precautions for identified infection/condition  Goal: Absence of infection during hospitalization  10/15/2024 2346 by Adriane Campbell RN  Outcome: Progressing  10/15/2024 1311 by Jayne Hobson RN  Outcome: Progressing  Flowsheets (Taken 10/15/2024 0810)  Absence of infection during hospitalization:   Assess and monitor for signs and symptoms of infection   Monitor lab/diagnostic results   Instruct and encourage patient and family to use good hand hygiene technique   Identify and instruct in appropriate isolation precautions for identified infection/condition   Administer medications as ordered  Goal: Absence of fever/infection during anticipated neutropenic period  10/15/2024 2346 by Adriane Campbell RN  Outcome: Progressing  10/15/2024 1311 by Jayne Hobson RN  Outcome: Progressing  Flowsheets (Taken 10/15/2024 0810)  Absence of fever/infection during anticipated neutropenic period: Monitor white blood cell count     Problem: Skin/Tissue Integrity  Goal: Absence of new skin

## 2024-10-16 NOTE — PROGRESS NOTES
Patient admitted to PACU # 12 from OR at 1251 post PARTIAL HALLUX AMPUTATION LEFT FOOT - Left  per Dr Bruno.  Attached to PACU monitoring system and report received from anesthesia provider.  Patient was reported to be hemodynamically stable during procedure.  Patient drowsy on admission and denied pain.

## 2024-10-16 NOTE — PROGRESS NOTES
WITH CLAREON PANOPTIX TORIC INTRAOCULAR LENS IMPLANT - LEFT EYE performed by Luiz Mancuso MD at [facility]    INSERTABLE CARDIAC MONITOR      out    KNEE SURGERY Right     meninscus repair    PACEMAKER INSERTION      had 8 seconds heart stop    TOE AMPUTATION Left 10/16/2024    PARTIAL HALLUX AMPUTATION LEFT FOOT performed by Jimbo Bruno DPM at [facility] OR    TONSILLECTOMY      UPPER GASTROINTESTINAL ENDOSCOPY N/A 3/1/2024    ESOPHAGOGASTRODUODENOSCOPY DILATATION performed by Ambrocio Coffey MD at [facility] ENDOSCOPY       Current Medications:    Outpatient Medications Marked as Taking for the 10/10/24 encounter (Hospital Encounter)   Medication Sig Dispense Refill    tadalafil (CIALIS) 5 MG tablet Take 1 tablet by mouth daily      propranolol (INDERAL) 40 MG tablet Take 1 tablet by mouth 2 times daily      traZODone (DESYREL) 100 MG tablet Take 2 tablets by mouth nightly      lisinopril (PRINIVIL;ZESTRIL) 20 MG tablet Take 1 tablet by mouth daily      vitamin D (VITAMIN D3) 50 MCG (2000 UT) CAPS capsule Take 1 capsule by mouth daily      nitroGLYCERIN (NITRO-DUR) 0.1 MG/HR Place 1 patch onto the skin daily 30 patch 3    Dapagliflozin Pro-metFORMIN ER (XIGDUO XR)  MG TB24 Take 1 tablet by mouth daily      Insulin Degludec 200 UNIT/ML SOPN Inject 70 Units into the skin daily      Semaglutide, 1 MG/DOSE, 2 MG/1.5ML SOPN Inject 1 mg into the skin once a week      pantoprazole (PROTONIX) 40 MG tablet Take 1 tablet by mouth daily      rosuvastatin (CRESTOR) 10 MG tablet Take 1 tablet by mouth nightly      clonazePAM (KLONOPIN) 1 MG tablet Take 1 tablet by mouth nightly.      desvenlafaxine succinate (PRISTIQ) 100 MG TB24 extended release tablet Take 1 tablet by mouth daily         Allergies:  Patient has no known allergies.    Immunizations :   Immunization History   Administered Date(s) Administered    COVID-19, PFIZER Bivalent, DO NOT Dilute, (age 12y+), IM, 30 mcg/0.3 mL 01/09/2023    COVID-19, PFIZER PURPLE top,    1.  No acute abnormality.         MRI FOOT LEFT W WO CONTRAST   Final Result   Osteomyelitis involving the 1st digit distal phalanx.      Diffuse skin thickening and edema overlying the great toe suggestive of   cellulitis.  Nonspecific hypodensity along the medial margin of the great toe   distally only on postcontrast imaging without peripheral enhancement could be   artifactual versus necrotic tissue versus less likely abscess.         XR CHEST PORTABLE   Final Result   Left subclavian dual chamber pacer.  No pneumothorax identified.         XR FOOT LEFT (MIN 3 VIEWS)   Final Result   1. No radiographic evidence for osteomyelitis.   2. Osteoarthritis of the ankle joint.         XR FOOT LEFT (MIN 3 VIEWS)    (Results Pending)       All pertinent images and reports for the current Hospitalization were reviewed by me.    IMPRESSION:    Patient Active Problem List   Diagnosis Code    Essential hypertension I10    Hyperlipemia E78.5    Type 2 diabetes mellitus with complication, with long-term current use of insulin ([facility]) E11.8, Z79.4    LBBB (left bundle branch block) I44.7    Cardiomyopathy ([facility]) I42.9    Diabetic ulcer of left great toe ([facility]) E11.621, L97.529    Left foot infection L08.9    Cellulitis of foot L03.119    Multiple drug resistant organism (MDRO) culture positive Z16.24    E coli infection A49.8    Type 2 diabetes mellitus with diabetic neuropathy, with long-term current use of insulin ([facility]) E11.40, Z79.4    Pacemaker Z95.0    Morbid obesity due to excess calories E66.01    Ulcer of toe due to diabetes mellitus ([facility]) E11.621, L97.509        ICD-10-CM    1. Diabetic ulcer of toe of left foot associated with diabetes mellitus of other type, unspecified ulcer stage ([facility])  E13.621     L97.529       2. Cellulitis of foot  L03.119       3. Goals of care, counseling/discussion  Z71.89       4. Acute osteomyelitis-ankle/foot, left  M86.172 Surgical Pathology     Surgical Pathology         Complicated left

## 2024-10-16 NOTE — FLOWSHEET NOTE
Pre-operative report received from TELLO Pelayo. Pre-checklist complete. Patient NPO. Reminded RN to hand antibiotitc prior to .

## 2024-10-16 NOTE — ANESTHESIA POSTPROCEDURE EVALUATION
Department of Anesthesiology  Postprocedure Note    Patient: Tony Boyle  MRN: [id number]  YOB: 1955  Date of evaluation: 10/16/2024    Procedure Summary       Date: 10/16/24 Room / Location: [address]    Anesthesia Start: 1156 Anesthesia Stop: 1255    Procedure: PARTIAL HALLUX AMPUTATION LEFT FOOT (Left: First Toe) Diagnosis:       Acute osteomyelitis-ankle/foot, left      (Acute osteomyelitis-ankle/foot, left [M86.172])    Surgeons: Jimbo Bruno DPM Responsible Provider: Christian Olea MD    Anesthesia Type: MAC ASA Status: 3            Anesthesia Type: MAC    Stephen Phase I: Stephen Score: 10    Stephen Phase II:      Anesthesia Post Evaluation    Patient location during evaluation: PACU  Patient participation: complete - patient participated  Level of consciousness: awake  Airway patency: patent  Nausea & Vomiting: no nausea and no vomiting  Cardiovascular status: hemodynamically stable, blood pressure returned to baseline and hypertensive  Respiratory status: spontaneous ventilation, nonlabored ventilation and room air  Hydration status: stable  Comments: Uneventful MAC anesthetic. Mr. Boyle was seen resting comfortably following his procedure. Appropriate for return to floor.  Pain management: adequate        No notable events documented.

## 2024-10-17 LAB
ANION GAP SERPL CALCULATED.3IONS-SCNC: 7 MMOL/L (ref 3–16)
BUN SERPL-MCNC: 14 MG/DL (ref 7–20)
CALCIUM SERPL-MCNC: 9.1 MG/DL (ref 8.3–10.6)
CHLORIDE SERPL-SCNC: 102 MMOL/L (ref 99–110)
CO2 SERPL-SCNC: 31 MMOL/L (ref 21–32)
CREAT SERPL-MCNC: 0.5 MG/DL (ref 0.8–1.3)
GFR SERPLBLD CREATININE-BSD FMLA CKD-EPI: >90 ML/MIN/{1.73_M2}
GLUCOSE BLD-MCNC: 170 MG/DL (ref 70–99)
GLUCOSE BLD-MCNC: 177 MG/DL (ref 70–99)
GLUCOSE BLD-MCNC: 190 MG/DL (ref 70–99)
GLUCOSE BLD-MCNC: 207 MG/DL (ref 70–99)
GLUCOSE SERPL-MCNC: 195 MG/DL (ref 70–99)
PERFORMED ON: ABNORMAL
POTASSIUM SERPL-SCNC: 3.9 MMOL/L (ref 3.5–5.1)
SODIUM SERPL-SCNC: 140 MMOL/L (ref 136–145)

## 2024-10-17 PROCEDURE — 94760 N-INVAS EAR/PLS OXIMETRY 1: CPT

## 2024-10-17 PROCEDURE — 6370000000 HC RX 637 (ALT 250 FOR IP)

## 2024-10-17 PROCEDURE — 2580000003 HC RX 258

## 2024-10-17 PROCEDURE — 80048 BASIC METABOLIC PNL TOTAL CA: CPT

## 2024-10-17 PROCEDURE — 99232 SBSQ HOSP IP/OBS MODERATE 35: CPT | Performed by: INTERNAL MEDICINE

## 2024-10-17 PROCEDURE — 6360000002 HC RX W HCPCS

## 2024-10-17 PROCEDURE — 1200000000 HC SEMI PRIVATE

## 2024-10-17 RX ADMIN — MEROPENEM 1000 MG: 1 INJECTION INTRAVENOUS at 18:23

## 2024-10-17 RX ADMIN — MEROPENEM 1000 MG: 1 INJECTION INTRAVENOUS at 04:21

## 2024-10-17 RX ADMIN — INSULIN LISPRO 4 UNITS: 100 INJECTION, SOLUTION INTRAVENOUS; SUBCUTANEOUS at 18:20

## 2024-10-17 RX ADMIN — TRAZODONE HYDROCHLORIDE 200 MG: 50 TABLET ORAL at 21:58

## 2024-10-17 RX ADMIN — PANTOPRAZOLE SODIUM 40 MG: 40 TABLET, DELAYED RELEASE ORAL at 06:01

## 2024-10-17 RX ADMIN — SODIUM CHLORIDE, PRESERVATIVE FREE 10 ML: 5 INJECTION INTRAVENOUS at 09:49

## 2024-10-17 RX ADMIN — VENLAFAXINE HYDROCHLORIDE 150 MG: 75 CAPSULE, EXTENDED RELEASE ORAL at 09:49

## 2024-10-17 RX ADMIN — CLONAZEPAM 1 MG: 0.5 TABLET ORAL at 21:58

## 2024-10-17 RX ADMIN — ROSUVASTATIN CALCIUM 10 MG: 10 TABLET, FILM COATED ORAL at 21:58

## 2024-10-17 RX ADMIN — LISINOPRIL 40 MG: 40 TABLET ORAL at 09:49

## 2024-10-17 RX ADMIN — ACETAMINOPHEN 325MG 650 MG: 325 TABLET ORAL at 04:23

## 2024-10-17 RX ADMIN — PROPRANOLOL HYDROCHLORIDE 40 MG: 40 TABLET ORAL at 09:49

## 2024-10-17 RX ADMIN — PROPRANOLOL HYDROCHLORIDE 40 MG: 40 TABLET ORAL at 21:58

## 2024-10-17 RX ADMIN — INSULIN LISPRO 4 UNITS: 100 INJECTION, SOLUTION INTRAVENOUS; SUBCUTANEOUS at 12:25

## 2024-10-17 RX ADMIN — MEROPENEM 1000 MG: 1 INJECTION INTRAVENOUS at 10:37

## 2024-10-17 ASSESSMENT — PAIN - FUNCTIONAL ASSESSMENT: PAIN_FUNCTIONAL_ASSESSMENT: ACTIVITIES ARE NOT PREVENTED

## 2024-10-17 ASSESSMENT — PAIN DESCRIPTION - ONSET: ONSET: ON-GOING

## 2024-10-17 ASSESSMENT — PAIN SCALES - GENERAL
PAINLEVEL_OUTOF10: 4
PAINLEVEL_OUTOF10: 0
PAINLEVEL_OUTOF10: 4
PAINLEVEL_OUTOF10: 7

## 2024-10-17 ASSESSMENT — PAIN DESCRIPTION - PAIN TYPE: TYPE: ACUTE PAIN;SURGICAL PAIN

## 2024-10-17 ASSESSMENT — PAIN DESCRIPTION - DESCRIPTORS
DESCRIPTORS: THROBBING
DESCRIPTORS: THROBBING

## 2024-10-17 ASSESSMENT — PAIN DESCRIPTION - LOCATION
LOCATION: FOOT

## 2024-10-17 ASSESSMENT — PAIN DESCRIPTION - FREQUENCY: FREQUENCY: CONTINUOUS

## 2024-10-17 ASSESSMENT — PAIN DESCRIPTION - ORIENTATION
ORIENTATION: LEFT
ORIENTATION: LEFT

## 2024-10-17 NOTE — CARE COORDINATION
10/17 Plan: Home with daughter. OPAT through 11/1 per ID with [facility] and [facility]. S/P L/Hallux resection on 10/16.Electronically signed by Celina Aguilar RN on 10/17/2024 at 3:21 PM

## 2024-10-17 NOTE — PROGRESS NOTES
Department of Podiatry Progress Note         Reason for Consult:  LEFT medial hallux wound with extending cellulitis  Requesting Physician:  Magaly Schafer MD    CHIEF COMPLAINT:  Wound of LEFT Great Toe over last week, with complicating cellulitis, failed oral antibiotics    HISTORY OF PRESENT ILLNESS:                The patient is a 68 y.o. male with significant past medical history of HTN with CAD, Diabetes with peripheral neuropathy who is consulted for a week's history of wound with worsening erythema. Patient relates he was seen by Urgent Care and given oral antibiotics that initially improved his wound color as compared to previous demarcation to midfoot. When seen by [facility]ound Care Center on Tuesday the erythema had receded to base of Great Toe from the marked midfoot, but by Wednesday the erythema returned and edema to his LEFT ankle had become of greater concern. Initially had Keflex, then given Augmentin, but had Coban wrap applied tightly to his wound. Patient presented to Emergency Dept for IV antibiotics and anticipated imaging    Patient away at [facility], chart review    Past Medical History:        Diagnosis Date    Depression     Diabetes mellitus (HCC)     GERD (gastroesophageal reflux disease)     Hyperlipidemia     Hypertension     LBBB (left bundle branch block)     SVT (supraventricular tachycardia) (HCC)      Past Surgical History:        Procedure Laterality Date    COLONOSCOPY      COLONOSCOPY N/A 3/1/2024    COLONOSCOPY POLYPECTOMY SNARE/COLD BIOPSY performed by Ambrocio Coffey MD at [facility] ENDOSCOPY    ENDOSCOPY, COLON, DIAGNOSTIC      EYE SURGERY Right 05/01/2023    PHACOEMULSIFICATION WITH CLAREON PANOPTIX TORIC INTRAOCULAR LENS IMPLANT - RIGHT EYE performed by Luiz Mancuso MD at [facility]    EYE SURGERY Left 05/15/2023    PHACOEMULSIFICATION WITH CLAREON PANOPTIX TORIC INTRAOCULAR LENS IMPLANT - LEFT EYE performed by Luiz Mancuso MD at [facility]    INSERTABLE CARDIAC MONITOR      out

## 2024-10-17 NOTE — PROGRESS NOTES
V2.0    [facility] [place]      Name:  Tony Boyle /Age/Sex: 1955  (68 y.o. male)   MRN & CSN:  [id number] & [id number] Encounter Date/Time: 10/17/2024 11:00 AM EDT   Location:  [id number] PCP: Hebert Bolivar MD     Attending:Taye Nix MD       Hospital Day: 8    Assessment and Recommendations   Tony Boyle is a 68 y.o. male with pmh of diabetes, GERD, depression, HLD, HTN, who presents with Left foot infection    Patient was examined this morning.  Patient feels well.  Continues to be on IV antibiotics.    Nursing staff at the bedside giving overnight events  Patient was found to have Osteomyelitis involving the 1st digit distal phalanx   Patient is status post op day 1 of bone infection of the left great toe due to osteomyelitis.     Infectious ease on board          Plan:   Cellulitis of left foot  Culture growing MDRO E. coli, staph epi, Enterobacter Cloacae  Patient is currently on vancomycin and meropenem IV  Podiatry on board  Infectious disease on board    2.  Diabetes mellitus with neuropathy  Hemoglobin A1c 9.9 on presentation  Insulin sliding scale 3 times daily with meals    3.  HDL  Patient is currently on rosuvastatin    4.  Left great toe ulcer  Follows up with podiatry Dr. Bruno  Podiatry on board    5.  GERD  Protonix 40 mg daily    6.  Anxiety and depression  Patient currently on clonazepam 1 mg and trazodone 200 mg at bedtime    Diet ADULT DIET; Regular; 4 carb choices (60 gm/meal)  ADULT ORAL NUTRITION SUPPLEMENT; Breakfast, Lunch, Dinner; Wound Healing Oral Supplement   DVT Prophylaxis [x] Lovenox, []  Heparin, [] SCDs, [x] Ambulation,  [] Eliquis, [] Xarelto  [] Coumadin   Code Status Full Code   Disposition From: Home  Expected Disposition: Home versus SNF  Estimated Date of Discharge: 2-3 days  Patient requires continued admission due to upon clinical improvement and surgical evaluation by podiatry   Surrogate Decision Maker/ POA       Personally  Results   Component Value Date/Time    BLOODCULT2 No Growth after 4 days of incubation. 10/10/2024 11:33 AM     Organism:   Lab Results   Component Value Date/Time    ORG Escherichia coli 10/10/2024 11:18 AM    ORG Staphylococcus epidermidis 10/10/2024 11:18 AM    ORG Enterobacter cloacae complex 10/10/2024 11:18 AM         Electronically signed by Taye Nix MD on 10/17/2024 at 10:52 AM  Comment: Please note this report has been produced using speech recognition software and may contain errors related to that system including errors in grammar, punctuation, and spelling, as well as words and phrases that may be inappropriate. If there are any questions or concerns, please feel free to contact the dictating provider for clarification.

## 2024-10-17 NOTE — PLAN OF CARE
Problem: Chronic Conditions and Co-morbidities  Goal: Patient's chronic conditions and co-morbidity symptoms are monitored and maintained or improved  10/17/2024 1137 by Gita Briones RN  Outcome: Progressing  10/17/2024 0453 by Adriane Espinosa RN  Outcome: Progressing     Problem: Discharge Planning  Goal: Discharge to home or other facility with appropriate resources  10/17/2024 1137 by iGta Briones RN  Outcome: Progressing  10/17/2024 0453 by Adriane Espinosa RN  Outcome: Progressing     Problem: Pain  Goal: Verbalizes/displays adequate comfort level or baseline comfort level  10/17/2024 1137 by Gita Briones RN  Outcome: Progressing  10/17/2024 0453 by Adriane Espinosa RN  Outcome: Progressing     Problem: Safety - Adult  Goal: Free from fall injury  10/17/2024 1137 by Gita Briones RN  Outcome: Progressing  10/17/2024 0453 by Adriane Espinosa RN  Outcome: Progressing     Problem: ABCDS Injury Assessment  Goal: Absence of physical injury  10/17/2024 1137 by Gita Briones RN  Outcome: Progressing  10/17/2024 0453 by Adriane Espinosa RN  Outcome: Progressing     Problem: Cardiovascular - Adult  Goal: Maintains optimal cardiac output and hemodynamic stability  10/17/2024 1137 by Gita Briones RN  Outcome: Progressing  10/17/2024 0453 by Adriane Espinosa RN  Outcome: Progressing  Goal: Absence of cardiac dysrhythmias or at baseline  Outcome: Progressing     Problem: Skin/Tissue Integrity - Adult  Goal: Skin integrity remains intact  Outcome: Progressing  Goal: Incisions, wounds, or drain sites healing without S/S of infection  Outcome: Progressing  Goal: Oral mucous membranes remain intact  Outcome: Progressing     Problem: Metabolic/Fluid and Electrolytes - Adult  Goal: Electrolytes maintained within normal limits  Outcome: Progressing  Goal: Hemodynamic stability and optimal renal function maintained  Outcome: Progressing  Goal: Glucose maintained

## 2024-10-17 NOTE — PROGRESS NOTES
Infectious Diseases Inpatient Progress Note      CHIEF COMPLAINT:     Left diabetic foot infection  Left great toe cellulitis  Left great toe osteomyelitis  MDRO infection  ESR elevation  CRP elevation    HISTORY OF PRESENT ILLNESS:  68 y.o. male with a significant history for diabetes, neuropathy, hypertension, hyperlipidemia, depression, history of pacemaker implantation admitted to hospital secondary to left great toe ulceration nonhealing wound associated cellulitis.  Patient noticed a blister on the medial aspect of the left great toe on 9/20/24, since then the blister progressed to open wound associated with cellulitis he was evaluated recently in the wound care center secondary to nonhealing left great toe ulceration.  Wound culture now positive for E. coli MDRO due to concern for worsening cellulitis now admitted to hospital was seen by  from podiatry.  Lactic 2 creatinine 0.7 CRP 35.5 LFT normal WBC normal ESR 41 wound culture now positive for E. coli, Enterobacter cloacae blood cultures negative MRI pending    Interval history: Left foot dressing intact wound VAC working well PICC line working well tolerating abx ok and bone path pending      Past Medical History:    Past Medical History:   Diagnosis Date    Depression     Diabetes mellitus (HCC)     GERD (gastroesophageal reflux disease)     Hyperlipidemia     Hypertension     LBBB (left bundle branch block)     SVT (supraventricular tachycardia) (HCC)        Past Surgical History:    Past Surgical History:   Procedure Laterality Date    COLONOSCOPY      COLONOSCOPY N/A 3/1/2024    COLONOSCOPY POLYPECTOMY SNARE/COLD BIOPSY performed by Ambrocio Coffey MD at [facility] ENDOSCOPY    ENDOSCOPY, COLON, DIAGNOSTIC      EYE SURGERY Right 05/01/2023    PHACOEMULSIFICATION WITH CLAREON PANOPTIX TORIC INTRAOCULAR LENS IMPLANT - RIGHT EYE performed by Luiz Mancuso MD at [facility]    EYE SURGERY Left 05/15/2023    PHACOEMULSIFICATION WITH CLAREON

## 2024-10-18 ENCOUNTER — TELEPHONE (OUTPATIENT)
Dept: INFECTIOUS DISEASES | Age: 69
End: 2024-10-18

## 2024-10-18 LAB
ANION GAP SERPL CALCULATED.3IONS-SCNC: 6 MMOL/L (ref 3–16)
BUN SERPL-MCNC: 16 MG/DL (ref 7–20)
CALCIUM SERPL-MCNC: 9.4 MG/DL (ref 8.3–10.6)
CHLORIDE SERPL-SCNC: 102 MMOL/L (ref 99–110)
CO2 SERPL-SCNC: 31 MMOL/L (ref 21–32)
CREAT SERPL-MCNC: 0.6 MG/DL (ref 0.8–1.3)
GFR SERPLBLD CREATININE-BSD FMLA CKD-EPI: >90 ML/MIN/{1.73_M2}
GLUCOSE BLD-MCNC: 176 MG/DL (ref 70–99)
GLUCOSE BLD-MCNC: 200 MG/DL (ref 70–99)
GLUCOSE BLD-MCNC: 207 MG/DL (ref 70–99)
GLUCOSE BLD-MCNC: 263 MG/DL (ref 70–99)
GLUCOSE SERPL-MCNC: 176 MG/DL (ref 70–99)
PERFORMED ON: ABNORMAL
POTASSIUM SERPL-SCNC: 3.7 MMOL/L (ref 3.5–5.1)
SODIUM SERPL-SCNC: 139 MMOL/L (ref 136–145)

## 2024-10-18 PROCEDURE — 94760 N-INVAS EAR/PLS OXIMETRY 1: CPT

## 2024-10-18 PROCEDURE — 6370000000 HC RX 637 (ALT 250 FOR IP)

## 2024-10-18 PROCEDURE — 99232 SBSQ HOSP IP/OBS MODERATE 35: CPT | Performed by: INTERNAL MEDICINE

## 2024-10-18 PROCEDURE — 2580000003 HC RX 258

## 2024-10-18 PROCEDURE — 6360000002 HC RX W HCPCS: Performed by: NURSE PRACTITIONER

## 2024-10-18 PROCEDURE — 1200000000 HC SEMI PRIVATE

## 2024-10-18 PROCEDURE — 80048 BASIC METABOLIC PNL TOTAL CA: CPT

## 2024-10-18 PROCEDURE — 6360000002 HC RX W HCPCS

## 2024-10-18 RX ORDER — HYDRALAZINE HYDROCHLORIDE 20 MG/ML
10 INJECTION INTRAMUSCULAR; INTRAVENOUS EVERY 6 HOURS PRN
Status: DISCONTINUED | OUTPATIENT
Start: 2024-10-18 | End: 2024-10-19 | Stop reason: HOSPADM

## 2024-10-18 RX ORDER — LABETALOL HYDROCHLORIDE 5 MG/ML
10 INJECTION, SOLUTION INTRAVENOUS EVERY 6 HOURS PRN
Status: DISCONTINUED | OUTPATIENT
Start: 2024-10-18 | End: 2024-10-18

## 2024-10-18 RX ADMIN — SODIUM CHLORIDE, PRESERVATIVE FREE 10 ML: 5 INJECTION INTRAVENOUS at 20:53

## 2024-10-18 RX ADMIN — MEROPENEM 1000 MG: 1 INJECTION INTRAVENOUS at 17:38

## 2024-10-18 RX ADMIN — HYDRALAZINE HYDROCHLORIDE 10 MG: 20 INJECTION INTRAMUSCULAR; INTRAVENOUS at 21:59

## 2024-10-18 RX ADMIN — SODIUM CHLORIDE, PRESERVATIVE FREE 10 ML: 5 INJECTION INTRAVENOUS at 08:47

## 2024-10-18 RX ADMIN — CLONAZEPAM 1 MG: 0.5 TABLET ORAL at 21:49

## 2024-10-18 RX ADMIN — MEROPENEM 1000 MG: 1 INJECTION INTRAVENOUS at 01:19

## 2024-10-18 RX ADMIN — PROPRANOLOL HYDROCHLORIDE 40 MG: 40 TABLET ORAL at 08:45

## 2024-10-18 RX ADMIN — ROSUVASTATIN CALCIUM 10 MG: 10 TABLET, FILM COATED ORAL at 19:55

## 2024-10-18 RX ADMIN — INSULIN LISPRO 4 UNITS: 100 INJECTION, SOLUTION INTRAVENOUS; SUBCUTANEOUS at 12:16

## 2024-10-18 RX ADMIN — INSULIN LISPRO 8 UNITS: 100 INJECTION, SOLUTION INTRAVENOUS; SUBCUTANEOUS at 17:38

## 2024-10-18 RX ADMIN — PROPRANOLOL HYDROCHLORIDE 40 MG: 40 TABLET ORAL at 19:55

## 2024-10-18 RX ADMIN — INSULIN LISPRO 4 UNITS: 100 INJECTION, SOLUTION INTRAVENOUS; SUBCUTANEOUS at 21:49

## 2024-10-18 RX ADMIN — TRAZODONE HYDROCHLORIDE 200 MG: 50 TABLET ORAL at 21:49

## 2024-10-18 RX ADMIN — SODIUM CHLORIDE, PRESERVATIVE FREE 10 ML: 5 INJECTION INTRAVENOUS at 20:52

## 2024-10-18 RX ADMIN — LISINOPRIL 40 MG: 40 TABLET ORAL at 08:45

## 2024-10-18 RX ADMIN — MEROPENEM 1000 MG: 1 INJECTION INTRAVENOUS at 11:08

## 2024-10-18 RX ADMIN — VENLAFAXINE HYDROCHLORIDE 150 MG: 75 CAPSULE, EXTENDED RELEASE ORAL at 08:45

## 2024-10-18 RX ADMIN — PANTOPRAZOLE SODIUM 40 MG: 40 TABLET, DELAYED RELEASE ORAL at 05:00

## 2024-10-18 NOTE — CARE COORDINATION
DISCHARGE SUMMARY     DATE OF DISCHARGE: 10/18/24    DISCHARGE DESTINATION: Home    HOME CARE: Yes  Discharging to Facility/ Agency   Name:  [facility]   Address: [address] [place] [address]  Phone: [phone]  Fax: [phone]      [facility]  [address] [place] [address]  Phone: [phone]  Fax: [phone]     HEMODIALYSIS: No    TRANSPORTATION: Private Car    NEW DME ORDERED: no    COMMENTS: Discharge to home with [facility] and [facility] for IV antibiotics. Electronically signed by Celina Aguilar RN on 10/18/2024 at 11:35 AM

## 2024-10-18 NOTE — PROGRESS NOTES
Infectious Diseases Inpatient Progress Note      CHIEF COMPLAINT:     Left diabetic foot infection  Left great toe cellulitis  Left great toe osteomyelitis  MDRO infection  ESR elevation  CRP elevation    HISTORY OF PRESENT ILLNESS:  68 y.o. male with a significant history for diabetes, neuropathy, hypertension, hyperlipidemia, depression, history of pacemaker implantation admitted to hospital secondary to left great toe ulceration nonhealing wound associated cellulitis.  Patient noticed a blister on the medial aspect of the left great toe on 9/20/24, since then the blister progressed to open wound associated with cellulitis he was evaluated recently in the wound care center secondary to nonhealing left great toe ulceration.  Wound culture now positive for E. coli MDRO due to concern for worsening cellulitis now admitted to hospital was seen by  from podiatry.  Lactic 2 creatinine 0.7 CRP 35.5 LFT normal WBC normal ESR 41 wound culture now positive for E. coli, Enterobacter cloacae blood cultures negative MRI pending    Interval history: Podiatry notes reviewed dressing intact PICC line working well bone path margins clean, pt agreeable for IV Meropenem for home going -     Past Medical History:    Past Medical History:   Diagnosis Date    Depression     Diabetes mellitus (HCC)     GERD (gastroesophageal reflux disease)     Hyperlipidemia     Hypertension     LBBB (left bundle branch block)     SVT (supraventricular tachycardia) (HCC)        Past Surgical History:    Past Surgical History:   Procedure Laterality Date    COLONOSCOPY      COLONOSCOPY N/A 3/1/2024    COLONOSCOPY POLYPECTOMY SNARE/COLD BIOPSY performed by Ambrocio Coffey MD at [facility] ENDOSCOPY    ENDOSCOPY, COLON, DIAGNOSTIC      EYE SURGERY Right 05/01/2023    PHACOEMULSIFICATION WITH CLAREON PANOPTIX TORIC INTRAOCULAR LENS IMPLANT - RIGHT EYE performed by Luiz Mancuso MD at [facility]    EYE SURGERY Left 05/15/2023     will be called.   Narrative:     ORDER#: [id number]                          ORDERED BY: HILARY GONZALEZ  SOURCE: Blood                              COLLECTED:  10/10/24 11:19  ANTIBIOTICS AT [facility].:                      RECEIVED :  10/10/24 11:23  If child <=2 yrs old please draw pediatric bottle.~Blood Culture 1   Culture, Blood 2 [[id number]] Collected: 10/10/24 1133   Order Status: Completed Specimen: Blood Updated: 10/11/24 1215    Culture, Blood 2 No Growth to date.  Any change in status will be called.   Narrative:     ORDER#: [id number]                          ORDERED BY: HILARY GONZALEZ  SOURCE: Blood                              COLLECTED:  10/10/24 11:33  ANTIBIOTICS AT [facility].:                      RECEIVED :  10/10/24 11:33     me       Culture, Wound [[id number]] (Abnormal) Collected: 10/10/24 1118   Order Status: Completed Specimen: Toe Updated: 10/12/24 0804    Gram Stain Result 1+ WBC's (Polymorphonuclear)  No Epithelial Cells seen  No organisms seen    Organism Escherichia coli Abnormal     WOUND/ABSCESS --    Light growth  No further workup  Refer to culture [id number] for sensitivity results    Organism Staphylococcus epidermidis Abnormal     WOUND/ABSCESS --    Light growth  No further workup    Organism Enterobacter cloacae complex Abnormal     WOUND/ABSCESS --    Light growth  No further workup  Refer to culture [id number] for sensitivity results   Narrative:     ORDER#: [id number]                          ORDERED BY: HILARY GONZALEZ  SOURCE: Toe                                COLLECTED:  10/10/24 11:18  ANTIBIOTICS AT [facility].:                      RECEIVED :  10/10/24 11:35   Culture, Blood 1 [[id number]] Collected: 10/10/24 1119   Order Status: Completed Specimen: Blood Updated: 10/11/24 1215    Blood Culture, Routine No Growth to date.  Any change in status will be called.   Narrative:     ORDER#: [id number]                          ORDERED BY: HILARY GONZALEZ  SOURCE: Blood                              COLLECTED:

## 2024-10-18 NOTE — PLAN OF CARE
Problem: Chronic Conditions and Co-morbidities  Goal: Patient's chronic conditions and co-morbidity symptoms are monitored and maintained or improved  10/18/2024 0936 by Gita Briones RN  Outcome: Progressing  10/18/2024 0501 by Adriane Espinosa RN  Outcome: Progressing     Problem: Discharge Planning  Goal: Discharge to home or other facility with appropriate resources  10/18/2024 0936 by Gita Briones RN  Outcome: Progressing  10/18/2024 0501 by Adriane Espinosa RN  Outcome: Progressing     Problem: Pain  Goal: Verbalizes/displays adequate comfort level or baseline comfort level  10/18/2024 0936 by Gita Briones RN  Outcome: Progressing  10/18/2024 0501 by Adriane Espinosa RN  Outcome: Progressing     Problem: Safety - Adult  Goal: Free from fall injury  Outcome: Progressing     Problem: ABCDS Injury Assessment  Goal: Absence of physical injury  Outcome: Progressing     Problem: Cardiovascular - Adult  Goal: Maintains optimal cardiac output and hemodynamic stability  Outcome: Progressing  Goal: Absence of cardiac dysrhythmias or at baseline  Outcome: Progressing     Problem: Skin/Tissue Integrity - Adult  Goal: Skin integrity remains intact  Outcome: Progressing  Goal: Incisions, wounds, or drain sites healing without S/S of infection  Outcome: Progressing  Goal: Oral mucous membranes remain intact  Outcome: Progressing     Problem: Metabolic/Fluid and Electrolytes - Adult  Goal: Electrolytes maintained within normal limits  Outcome: Progressing  Goal: Hemodynamic stability and optimal renal function maintained  Outcome: Progressing  Goal: Glucose maintained within prescribed range  Outcome: Progressing     Problem: Infection - Adult  Goal: Absence of infection at discharge  Outcome: Progressing  Goal: Absence of infection during hospitalization  Outcome: Progressing  Goal: Absence of fever/infection during anticipated neutropenic period  Outcome: Progressing     Problem:  Skin/Tissue Integrity  Goal: Absence of new skin breakdown  Description: 1.  Monitor for areas of redness and/or skin breakdown  2.  Assess vascular access sites hourly  3.  Every 4-6 hours minimum:  Change oxygen saturation probe site  4.  Every 4-6 hours:  If on nasal continuous positive airway pressure, respiratory therapy assess nares and determine need for appliance change or resting period.  Outcome: Progressing     Problem: Nutrition Deficit:  Goal: Optimize nutritional status  Outcome: Progressing  Flowsheets (Taken 10/18/2024 1246 by Patrica Diamond)  Nutrient intake appropriate for improving, restoring, or maintaining nutritional needs:   Assess nutritional status and recommend course of action   Monitor oral intake, labs, and treatment plans   Recommend appropriate diets, oral nutritional supplements, and vitamin/mineral supplements     Problem: Respiratory - Adult  Goal: Achieves optimal ventilation and oxygenation  Outcome: Progressing     Problem: Neurosensory - Adult  Goal: Achieves stable or improved neurological status  Outcome: Progressing  Goal: Absence of seizures  Outcome: Progressing  Goal: Remains free of injury related to seizures activity  Outcome: Progressing  Goal: Achieves maximal functionality and self care  Outcome: Progressing

## 2024-10-18 NOTE — OP NOTE
Operative Note      Patient: Tony Boyle  YOB: 1955  MRN: [id number]     Date of Procedure: 10/16/2024     Pre-Op Diagnosis Codes:      * Acute osteomyelitis-ankle/foot, left [M86.172]     Post-Op Diagnosis: Same       Procedure(s):  PARTIAL HALLUX AMPUTATION LEFT FOOT     Surgeon(s):  Jimbo Bruno DPM     Assistant:  Resident: Anny Castellanos PGY2     Anesthesia: Monitor Anesthesia Care     Estimated Blood Loss (mL): Minimal     Hemostasis: anatomic dissection and electrocautery, pneumatic ankle tourniquet placed but not inflated      Injectables: Pre-Op 10 cc of 2% Polocaine plain     Materials: 2-0 Vicryl, 2-0, 3-0 Nylon     Implants: None       Complications: None     Specimens:   ID Type Source Tests Collected by Time Destination   A : A. DISTAL LEFT GREAT TOE Specimen Toe SURGICAL PATHOLOGY Jimbo Bruno DPM 10/16/2024 1217         Drains: None     Findings:  Infection Present At Time Of Surgery (PATOS) (choose all levels that have infection present):  - Organ Space infection (below fascia) present as evidenced by osteomyelitis  Other Findings: Ulceration to the medial aspect of the hallux of the left foot at the time of surgery. No further signs of infection appreciated. Incision was closed fully. The head of the proximal phalanx was hard, white and shiny consistent with healthy bone.    INDICATIONS FOR PROCEDURE: This patient has signs and symptoms clinically and radiographically consistent with the above mentioned preoperative diagnosis. Having failed conservative treatment, it was determined that the patient would benefit from surgical intervention. All potential risks, benefits, and complications were discussed with the patient prior to the scheduling of surgery. All the patient's questions were answered and no guarantees were given. The patient wished to proceed with surgery, and informed written consent was obtained.     DETAILS OF PROCEDURE: The patient was brought from the  pre-operative area and placed on the operating table in the supine position. A pneumatic ankle tourniquet was placed around the patient's well-padded left lower extremity however was not inflated for the entirety of the case. Following IV sedation, a local anesthetic block was then injected proximal to the incision site consisting of 10 cc of 2% Polocaine plain. The left lower extremity was then scrubbed, prepped, and draped in the usual sterile fashion. A time-out was performed. The patient, procedure, and operative site were confirmed.    PROCEDURE #1: PARTIAL HALLUX AMPUTATION LEFT FOOT:  Attention was then directed to the hallux on the left foot. A full thickness ulceration was appreciated to the medial aspect of the hallux extending from the distal tip to just distal to the MTPJ. The ulceration was excised utilizing a #15 blade. Attention was then directed towards removal of the distal phalanx. The distal phalanx was completely excised and passed off the back table. Due to inadequate soft tissue coverage the decision was made to remove a portion of the proximal phalanx. Utilizing a sagittal saw the medial condyle of the proximal phalanx was removed. At this point the wound edges were re-approximated and it was determined that adequate soft tissue coverage was achieved. At this time, the incision site was flushed using copious amounts of normal saline. The incision site was closed using 3-0 vicryl and 3-0 nylon.     At this time, a soft sterile dressing was applied consisting of xeroform, dry gauze, cast padding, ACE bandage.     END OF PROCEDURE: The patient tolerated the procedure and anesthesia well and was transported from the operating room to the PACU with vital signs stable and vascular status intact to all aspects of the patient's left lower extremity and digital capillary refill time immediate to the digits of the left foot. Following a period of post-operative monitoring, the patient will be

## 2024-10-18 NOTE — TELEPHONE ENCOUNTER
Problem: Pressure Injury - Risk of  Goal: *Prevention of pressure injury  Description: Document Jonah Scale and appropriate interventions in the flowsheet. Outcome: Progressing Towards Goal  Note: Pressure Injury Interventions:  Sensory Interventions: Float heels, Keep linens dry and wrinkle-free    Moisture Interventions: Absorbent underpads    Activity Interventions: Increase time out of bed    Mobility Interventions: HOB 30 degrees or less    Nutrition Interventions: Document food/fluid/supplement intake    Friction and Shear Interventions: HOB 30 degrees or less                Problem: Falls - Risk of  Goal: *Absence of Falls  Description: Document Carlos Fall Risk and appropriate interventions in the flowsheet.   Outcome: Progressing Towards Goal  Note: Fall Risk Interventions:  Mobility Interventions: Bed/chair exit alarm    Mentation Interventions: Bed/chair exit alarm, Door open when patient unattended    Medication Interventions: Bed/chair exit alarm    Elimination Interventions: Bed/chair exit alarm, Call light in reach, Toileting schedule/hourly rounds    History of Falls Interventions: Bed/chair exit alarm, Door open when patient unattended         Problem: Hypertension  Goal: *Blood pressure within specified parameters  Outcome: Progressing Towards Goal  Goal: *Labs within defined limits  Outcome: Progressing Towards Goal     Problem: Patient Education: Go to Patient Education Activity  Goal: Patient/Family Education  Outcome: Progressing Towards Goal Representative from [facility] is asking for an alternative medication. Family is concerned about $54/day cost of Meropenem. Please advise.    Thanks.

## 2024-10-18 NOTE — PLAN OF CARE
Problem: Chronic Conditions and Co-morbidities  Goal: Patient's chronic conditions and co-morbidity symptoms are monitored and maintained or improved  10/18/2024 1741 by Gita Briones RN  Outcome: Adequate for Discharge  10/18/2024 0936 by Gita Briones RN  Outcome: Progressing  10/18/2024 0501 by Adriane Espinosa RN  Outcome: Progressing     Problem: Discharge Planning  Goal: Discharge to home or other facility with appropriate resources  10/18/2024 1741 by Gita Briones RN  Outcome: Adequate for Discharge  10/18/2024 0936 by Gita Briones RN  Outcome: Progressing  10/18/2024 0501 by Adriane Espinosa RN  Outcome: Progressing     Problem: Pain  Goal: Verbalizes/displays adequate comfort level or baseline comfort level  10/18/2024 1741 by Gita Briones RN  Outcome: Adequate for Discharge  10/18/2024 0936 by Gita Briones RN  Outcome: Progressing  10/18/2024 0501 by Adriane Espinosa RN  Outcome: Progressing     Problem: Safety - Adult  Goal: Free from fall injury  10/18/2024 1741 by Gita Briones RN  Outcome: Adequate for Discharge  10/18/2024 0936 by Gita Briones RN  Outcome: Progressing     Problem: ABCDS Injury Assessment  Goal: Absence of physical injury  10/18/2024 1741 by Gita Briones RN  Outcome: Adequate for Discharge  10/18/2024 0936 by Gita Briones RN  Outcome: Progressing     Problem: Cardiovascular - Adult  Goal: Maintains optimal cardiac output and hemodynamic stability  10/18/2024 1741 by Gita Briones RN  Outcome: Adequate for Discharge  10/18/2024 0936 by Gita Briones RN  Outcome: Progressing  Goal: Absence of cardiac dysrhythmias or at baseline  10/18/2024 1741 by Gita Briones RN  Outcome: Adequate for Discharge  10/18/2024 0936 by Gita Briones RN  Outcome: Progressing     Problem: Skin/Tissue Integrity - Adult  Goal: Skin integrity remains intact  10/18/2024 1741 by Gita Briones  sites hourly  3.  Every 4-6 hours minimum:  Change oxygen saturation probe site  4.  Every 4-6 hours:  If on nasal continuous positive airway pressure, respiratory therapy assess nares and determine need for appliance change or resting period.  10/18/2024 1741 by Gita Briones RN  Outcome: Adequate for Discharge  10/18/2024 0936 by Gita Briones RN  Outcome: Progressing     Problem: Nutrition Deficit:  Goal: Optimize nutritional status  10/18/2024 1741 by Gita Briones RN  Outcome: Adequate for Discharge  10/18/2024 0936 by Gita Briones RN  Outcome: Progressing  Flowsheets (Taken 10/18/2024 0846 by Patrica Diamond)  Nutrient intake appropriate for improving, restoring, or maintaining nutritional needs:   Assess nutritional status and recommend course of action   Monitor oral intake, labs, and treatment plans   Recommend appropriate diets, oral nutritional supplements, and vitamin/mineral supplements     Problem: Respiratory - Adult  Goal: Achieves optimal ventilation and oxygenation  10/18/2024 1741 by Gita Briones RN  Outcome: Adequate for Discharge  10/18/2024 0936 by Gita Briones RN  Outcome: Progressing     Problem: Neurosensory - Adult  Goal: Achieves stable or improved neurological status  10/18/2024 1741 by Gita Briones RN  Outcome: Adequate for Discharge  10/18/2024 0936 by Gita Briones RN  Outcome: Progressing  Goal: Absence of seizures  10/18/2024 1741 by Gita Briones RN  Outcome: Adequate for Discharge  10/18/2024 0936 by Gita Briones RN  Outcome: Progressing  Goal: Remains free of injury related to seizures activity  10/18/2024 1741 by Gita Briones RN  Outcome: Adequate for Discharge  10/18/2024 0936 by Gita Briones RN  Outcome: Progressing  Goal: Achieves maximal functionality and self care  10/18/2024 1741 by Gita Briones RN  Outcome: Adequate for Discharge  10/18/2024 0936 by Gita Briones

## 2024-10-18 NOTE — PROGRESS NOTES
Comprehensive Nutrition Assessment    Type and Reason for Visit:  Reassess    Nutrition Recommendations/Plan:   Continue regular; 4 carb choices (60 gm/meal)  Continue Ivan BID for wound healing  Continue to monitor nutritional adequacy     Malnutrition Assessment:  Malnutrition Status:  No malnutrition (10/14/24 1515)    Context:  Chronic Illness       Nutrition Assessment:    F/U: Pt continues on a Regular; 4 carb choice (60 gm/meal) diet with Ivan BID, and is consistently eating % of meals. Pt weight appears stable. Stated appetite is good and is drinking Ivan provided. In good spirits and is expecting to be discharged soon.    Nutrition Related Findings:    Creatinine 0.9. Glucose 176 H. LBM noted on 10/18.  RLE edema 1+; pitting, and LLE edema 3+; pitting noted. Wound Type: Surgical Incision, Diabetic Ulcer (diabetic wound to left great toe, surgical incision to left first toe)       Current Nutrition Intake & Therapies:    Average Meal Intake: %  Average Supplements Intake: %  ADULT DIET; Regular; 4 carb choices (60 gm/meal)  ADULT ORAL NUTRITION SUPPLEMENT; Lunch, Dinner; Wound Healing Oral Supplement    Anthropometric Measures:  Height: 185.4 cm (6' 1\") (As per pt.)  Ideal Body Weight (IBW): 184 lbs (84 kg)    Admission Body Weight: 122 kg (269 lb)  Current Body Weight: 122 kg (269 lb),   IBW. Weight Source: Bed Scale  Current BMI (kg/m2): 35.5                          BMI Categories: Obese Class 2 (BMI 35.0 -39.9)    Estimated Daily Nutrient Needs:        Energy (kcal/day): 5946-0651 (15-18 x  kg)     Protein (g/day): 101-168 (1.2-2 x IBW 84 kg (adj for healing)  Method Used for Fluid Requirements: 1 ml/kcal  Fluid (ml/day): 1830 - 2196 ml/day    Nutrition Diagnosis:   Increased nutrient needs related to increase demand for energy/nutrients as evidenced by wounds    Nutrition Interventions:   Food and/or Nutrient Delivery: Continue Current Diet, Continue Oral Nutrition

## 2024-10-18 NOTE — DISCHARGE SUMMARY
Hospital Medicine Discharge Summary    Patient ID: Tony Boyle      Patient's PCP: Hebert Bolivar MD    Admit Date: 10/10/2024     Discharge Date:   10/18/2024    Admitting Provider: Magaly Schafer MD     Discharge Provider: Taye Nix MD     Discharge Diagnoses:       Active Hospital Problems    Diagnosis     Cellulitis of foot [L03.119]     Multiple drug resistant organism (MDRO) culture positive [Z16.24]     E coli infection [A49.8]     Type 2 diabetes mellitus with diabetic neuropathy, with long-term current use of insulin (HCC) [E11.40, Z79.4]     Pacemaker [Z95.0]     Morbid obesity due to excess calories [E66.01]     Ulcer of toe due to diabetes mellitus (HCC) [E11.621, L97.509]     Left foot infection [L08.9]        The patient was seen and examined on day of discharge and this discharge summary is in conjunction with any daily progress note from day of discharge.    Hospital Course:   Tony Boyle is a 68 y.o. male with pmh of diabetes, GERD, depression, HLD, HTN, who presents with Left foot infection most likely due to osteomyelitis of the left first digit distal phalanx.  Patient was taken to the OR on 10/16/2024 for wound cleaning and clipping of the distal phalanx which she tolerated well.  Patient will be discharged home with IV antibiotic meropenem 1 g every 8 hours until 11/1/2024.   Patient's blood pressure has been moderately elevated since his presentation.  Will amlodipine 5 mg daily will be added to his regiment.  Patient does have a cardiology appointment later this month  Patient is to follow-up with podiatry  Patient is follow-up PCP in 3 to 5 days or sooner if needed          Physical Exam Performed:     BP (!) 166/76   Pulse 79   Temp 98.4 °F (36.9 °C) (Oral)   Resp 17   Ht 1.854 m (6' 1\") Comment: As per pt.  Wt 124.4 kg (274 lb 4 oz)   SpO2 96%   BMI 36.18 kg/m²       General appearance: No apparent distress, cooperative.  HEENT: Pupils equal, round, and  this patient's care. If you have any questions or concerns, please feel free to contact me at [phone].    Comment: Please note this report has been produced using speech recognition software and may contain errors related to that system including errors in grammar, punctuation, and spelling, as well as words and phrases that may be inappropriate. If there are any questions or concerns, please feel free to contact the dictating provider for clarification.

## 2024-10-19 VITALS
SYSTOLIC BLOOD PRESSURE: 166 MMHG | DIASTOLIC BLOOD PRESSURE: 82 MMHG | RESPIRATION RATE: 16 BRPM | BODY MASS INDEX: 35.97 KG/M2 | TEMPERATURE: 97.8 F | HEART RATE: 79 BPM | WEIGHT: 271.39 LBS | OXYGEN SATURATION: 94 % | HEIGHT: 73 IN

## 2024-10-19 LAB
GLUCOSE BLD-MCNC: 199 MG/DL (ref 70–99)
PERFORMED ON: ABNORMAL

## 2024-10-19 PROCEDURE — 6370000000 HC RX 637 (ALT 250 FOR IP)

## 2024-10-19 PROCEDURE — 2580000003 HC RX 258

## 2024-10-19 PROCEDURE — 6370000000 HC RX 637 (ALT 250 FOR IP): Performed by: FAMILY MEDICINE

## 2024-10-19 PROCEDURE — 6360000002 HC RX W HCPCS

## 2024-10-19 RX ORDER — AMLODIPINE BESYLATE 5 MG/1
5 TABLET ORAL DAILY
Status: DISCONTINUED | OUTPATIENT
Start: 2024-10-19 | End: 2024-10-19 | Stop reason: HOSPADM

## 2024-10-19 RX ORDER — AMLODIPINE BESYLATE 5 MG/1
5 TABLET ORAL DAILY
Qty: 30 TABLET | Refills: 0 | Status: ON HOLD | OUTPATIENT
Start: 2024-10-19

## 2024-10-19 RX ADMIN — PANTOPRAZOLE SODIUM 40 MG: 40 TABLET, DELAYED RELEASE ORAL at 06:13

## 2024-10-19 RX ADMIN — VENLAFAXINE HYDROCHLORIDE 150 MG: 75 CAPSULE, EXTENDED RELEASE ORAL at 08:58

## 2024-10-19 RX ADMIN — PROPRANOLOL HYDROCHLORIDE 40 MG: 40 TABLET ORAL at 08:58

## 2024-10-19 RX ADMIN — SODIUM CHLORIDE, PRESERVATIVE FREE 10 ML: 5 INJECTION INTRAVENOUS at 08:58

## 2024-10-19 RX ADMIN — INSULIN LISPRO 4 UNITS: 100 INJECTION, SOLUTION INTRAVENOUS; SUBCUTANEOUS at 08:58

## 2024-10-19 RX ADMIN — LISINOPRIL 40 MG: 40 TABLET ORAL at 08:58

## 2024-10-19 RX ADMIN — AMLODIPINE BESYLATE 5 MG: 5 TABLET ORAL at 09:16

## 2024-10-19 RX ADMIN — MEROPENEM 1000 MG: 1 INJECTION INTRAVENOUS at 02:19

## 2024-10-19 NOTE — PROGRESS NOTES
Checking on patient Q2H for nutrition needs, hygiene needs, comfort measures, mobility, fall risk interventions, and safe environment. All precautions and interventions in place. Educated patient on use of call light and telephone. Patient verbalizes understanding. Call light/telephone in reach.Electronically signed by HARI ALAS RN on 10/19/2024 at 8:06 AM

## 2024-10-19 NOTE — PROGRESS NOTES
V2.0    [facility] Progress Note      Name:  Tony Boyle /Age/Sex: 1955  (68 y.o. male)   MRN & CSN:  [id number] & [id number] Encounter Date/Time: 10/19/2024 11:00 AM EDT   Location:  [id number] PCP: Hebert Bolivar MD     Attending:Taye Nix MD       Hospital Day: 10    Assessment and Recommendations   Tony Boyle is a 68 y.o. male with pmh of diabetes, GERD, depression, HLD, HTN, who presents with Left foot infection    Patient was examined this morning.  Patient feels well.  Continues to be on IV antibiotics.    Nursing staff at the bedside giving overnight events  Patient was found to have Osteomyelitis involving the 1st digit distal phalanx   Patient is status post op day 2 of bone infection of the left great toe due to osteomyelitis.     Infectious ease on board          Plan:   Cellulitis of left foot  Culture growing MDRO E. coli, staph epi, Enterobacter Cloacae  Patient is currently on vancomycin and meropenem IV  Podiatry on board  Infectious disease on board    2.  Diabetes mellitus with neuropathy  Hemoglobin A1c 9.9 on presentation  Insulin sliding scale 3 times daily with meals    3.  HDL  Patient is currently on rosuvastatin    4.  Left great toe ulcer  Follows up with podiatry Dr. Bruno  Podiatry on board    5.  GERD  Protonix 40 mg daily    6.  Anxiety and depression  Patient currently on clonazepam 1 mg and trazodone 200 mg at bedtime    Diet ADULT DIET; Regular; 4 carb choices (60 gm/meal)  ADULT ORAL NUTRITION SUPPLEMENT; Lunch, Dinner; Wound Healing Oral Supplement   DVT Prophylaxis [x] Lovenox, []  Heparin, [] SCDs, [x] Ambulation,  [] Eliquis, [] Xarelto  [] Coumadin   Code Status Full Code   Disposition From: Home  Expected Disposition: Home versus SNF  Estimated Date of Discharge: 2-3 days  Patient requires continued admission due to upon clinical improvement and surgical evaluation by podiatry   Surrogate Decision Maker/ POA       Personally reviewed Lab

## 2024-10-19 NOTE — PROGRESS NOTES
Pt with elevated b/p this pm scheduled propranolol given b/p remains elevated md notified pt cont to request to go home.md cancelled discharge this pm. Spoke at length with pt r/t elevated b/p and possible complications r/t. Allowed pt to vent. Pt agreeable to stay overnight.

## 2024-10-19 NOTE — PROGRESS NOTES
WITH CLAREON PANOPTIX TORIC INTRAOCULAR LENS IMPLANT - RIGHT EYE performed by Luiz Mancuso MD at [facility]    EYE SURGERY Left 05/15/2023    PHACOEMULSIFICATION WITH CLAREON PANOPTIX TORIC INTRAOCULAR LENS IMPLANT - LEFT EYE performed by Luiz Mancuso MD at [facility]    INSERTABLE CARDIAC MONITOR      out    KNEE SURGERY Right     meninscus repair    PACEMAKER INSERTION      had 8 seconds heart stop    TOE AMPUTATION Left 10/16/2024    PARTIAL HALLUX AMPUTATION LEFT FOOT performed by Jimbo Bruno DPM at [facility] OR    TONSILLECTOMY      UPPER GASTROINTESTINAL ENDOSCOPY N/A 3/1/2024    ESOPHAGOGASTRODUODENOSCOPY DILATATION performed by Ambrocio Coffey MD at [facility] END[facility]     Current Medications:    Current Facility-Administered Medications: hydrALAZINE (APRESOLINE) injection 10 mg, 10 mg, IntraVENous, Q6H PRN  [Held by provider] enoxaparin Sodium (LOVENOX) injection 30 mg, 30 mg, SubCUTAneous, BID  influenza quadrivalent split vaccine (FLUZONE;FLUARIX;FLULAVAL;AFLURIA) injection 0.5 mL, 1 Dose, IntraMUSCular, Once  sodium chloride flush 0.9 % injection 5-40 mL, 5-40 mL, IntraVENous, 2 times per day  sodium chloride flush 0.9 % injection 5-40 mL, 5-40 mL, IntraVENous, PRN  0.9 % sodium chloride infusion, , IntraVENous, PRN  lisinopril (PRINIVIL;ZESTRIL) tablet 40 mg, 40 mg, Oral, Daily  venlafaxine (EFFEXOR XR) extended release capsule 150 mg, 150 mg, Oral, Daily with breakfast  sodium chloride flush 0.9 % injection 5-40 mL, 5-40 mL, IntraVENous, 2 times per day  sodium chloride flush 0.9 % injection 5-40 mL, 5-40 mL, IntraVENous, PRN  0.9 % sodium chloride infusion, , IntraVENous, PRN  potassium chloride (KLOR-CON M) extended release tablet 40 mEq, 40 mEq, Oral, PRN **OR** potassium bicarb-citric acid (EFFER-K) effervescent tablet 40 mEq, 40 mEq, Oral, PRN **OR** potassium chloride 10 mEq/100 mL IVPB (Peripheral Line), 10 mEq, IntraVENous, PRN  magnesium sulfate 2000 mg in 50 mL IVPB premix, 2,000 mg,  IntraVENous, PRN  ondansetron (ZOFRAN-ODT) disintegrating tablet 4 mg, 4 mg, Oral, Q8H PRN **OR** ondansetron (ZOFRAN) injection 4 mg, 4 mg, IntraVENous, Q6H PRN  polyethylene glycol (GLYCOLAX) packet 17 g, 17 g, Oral, Daily PRN  acetaminophen (TYLENOL) tablet 650 mg, 650 mg, Oral, Q6H PRN **OR** acetaminophen (TYLENOL) suppository 650 mg, 650 mg, Rectal, Q6H PRN  clonazePAM (KLONOPIN) tablet 1 mg, 1 mg, Oral, Nightly  pantoprazole (PROTONIX) tablet 40 mg, 40 mg, Oral, QAM AC  propranolol (INDERAL) tablet 40 mg, 40 mg, Oral, BID  rosuvastatin (CRESTOR) tablet 10 mg, 10 mg, Oral, Nightly  traZODone (DESYREL) tablet 200 mg, 200 mg, Oral, Nightly  insulin lispro (HUMALOG,ADMELOG) injection vial 0-16 Units, 0-16 Units, SubCUTAneous, 4x Daily AC & HS  glucose chewable tablet 16 g, 4 tablet, Oral, PRN  dextrose bolus 10% 125 mL, 125 mL, IntraVENous, PRN **OR** dextrose bolus 10% 250 mL, 250 mL, IntraVENous, PRN  glucagon injection 1 mg, 1 mg, SubCUTAneous, PRN  dextrose 10 % infusion, , IntraVENous, Continuous PRN    Allergies:   Patient has no known allergies.      REVIEW OF SYSTEMS:    CONSTITUTIONAL:  negative  INTEGUMENT/BREAST:  positive for skin lesion(s) and dryness  ENDOCRINE:  positive for diabetic symptoms including neither foot ulcerations nor neuropathy  MUSCULOSKELETAL:  positive for  decreased range of motion  NEUROLOGICAL:  positive for numbness  PHYSICAL EXAM:      Vitals:    BP (!) 166/82   Pulse 79   Temp 97.8 °F (36.6 °C) (Oral)   Resp 16   Ht 1.854 m (6' 1\") Comment: As per pt.  Wt 123.1 kg (271 lb 6.2 oz)   SpO2 94%   BMI 35.81 kg/m²     LABS:   No results for input(s): \"WBC\", \"HGB\", \"HCT\", \"PLT\" in the last 72 hours.    Recent Labs     10/18/24  0500      K 3.7      CO2 31   BUN 16   CREATININE 0.6*     No results for input(s): \"INR\", \"APTT\" in the last 72 hours.    Invalid input(s): \"PROT\"      LOWER EXTREMITY EXAMINATION   Dressing is c/d/I.  No dressing change

## 2024-10-19 NOTE — SIGNIFICANT EVENT
APRN notified by RN of patient /85, HR 77 after routine propranolol  -pt has order to discharge this evening  -case reviewed with nocturnist Dr Aguirre who recommends hold discharge in light of significant BP elevation  -labetolol PRN for SBP > 170  -further pending re-eval by attending in AM    Collette Shaver, CLARENCE - NP

## 2024-10-19 NOTE — CARE COORDINATION
CASE MANAGEMENT DISCHARGE SUMMARY:    DISCHARGE DATE: 1/19/24    DISCHARGED TO HOME     Discharging to Facility/ Agency   Name:  [facility]    Address: [address] [place] [address]  Phone: [phone]  Fax: [phone]    [facility]  [address] [place] [address]  Phone: [phone]  Fax: [phone]     TRANSPORTATION: family    Electronically signed by DEQUAN Le on 10/19/2024 at 11:10 AM

## 2024-10-19 NOTE — PLAN OF CARE
Problem: Neurosensory - Adult  Goal: Achieves stable or improved neurological status  Outcome: Progressing     Problem: Cardiovascular - Adult  Goal: Maintains optimal cardiac output and hemodynamic stability  Outcome: Progressing     Problem: Skin/Tissue Integrity - Adult  Goal: Skin integrity remains intact  Outcome: Progressing     Problem: Infection - Adult  Goal: Absence of infection at discharge  Outcome: Progressing     Problem: Metabolic/Fluid and Electrolytes - Adult  Goal: Electrolytes maintained within normal limits  Outcome: Progressing

## 2024-10-19 NOTE — PLAN OF CARE
Problem: Chronic Conditions and Co-morbidities  Goal: Patient's chronic conditions and co-morbidity symptoms are monitored and maintained or improved  Recent Flowsheet Documentation  Taken 10/19/2024 0802 by Mahsa Lance RN  Care Plan - Patient's Chronic Conditions and Co-Morbidity Symptoms are Monitored and Maintained or Improved: Monitor and assess patient's chronic conditions and comorbid symptoms for stability, deterioration, or improvement     Problem: Discharge Planning  Goal: Discharge to home or other facility with appropriate resources  Recent Flowsheet Documentation  Taken 10/19/2024 0802 by Mahsa Lance RN  Discharge to home or other facility with appropriate resources: Identify barriers to discharge with patient and caregiver     Problem: Cardiovascular - Adult  Goal: Maintains optimal cardiac output and hemodynamic stability  Recent Flowsheet Documentation  Taken 10/19/2024 0802 by Mahsa Lance RN  Maintains optimal cardiac output and hemodynamic stability: Monitor blood pressure and heart rate  Goal: Absence of cardiac dysrhythmias or at baseline  Recent Flowsheet Documentation  Taken 10/19/2024 0802 by Mahsa Lance RN  Absence of cardiac dysrhythmias or at baseline: Monitor cardiac rate and rhythm     Problem: Skin/Tissue Integrity - Adult  Goal: Skin integrity remains intact  Recent Flowsheet Documentation  Taken 10/19/2024 0802 by Mahsa Lance RN  Skin Integrity Remains Intact: Monitor for areas of redness and/or skin breakdown  Goal: Incisions, wounds, or drain sites healing without S/S of infection  Recent Flowsheet Documentation  Taken 10/19/2024 0802 by Mahsa Lance RN  Incisions, Wounds, or Drain Sites Healing Without Sign and Symptoms of Infection: TWICE DAILY: Assess and document skin integrity  Goal: Oral mucous membranes remain intact  Recent Flowsheet Documentation  Taken 10/19/2024 0802 by Mahsa Lance RN  Oral Mucous Membranes Remain Intact: Assess oral mucosa and

## 2024-10-21 DIAGNOSIS — Z16.24 MULTIPLE DRUG RESISTANT ORGANISM (MDRO) CULTURE POSITIVE: Primary | ICD-10-CM

## 2024-10-21 LAB
ALBUMIN SERPL-MCNC: 3.8 G/DL (ref 3.4–5)
ALBUMIN/GLOB SERPL: 1.5 {RATIO} (ref 1.1–2.2)
ALP SERPL-CCNC: 105 U/L (ref 40–129)
ALT SERPL-CCNC: 16 U/L (ref 10–40)
ANION GAP SERPL CALCULATED.3IONS-SCNC: 11 MMOL/L (ref 3–16)
AST SERPL-CCNC: 18 U/L (ref 15–37)
BASOPHILS # BLD: 0 K/UL (ref 0–0.2)
BASOPHILS NFR BLD: 0.3 %
BILIRUB SERPL-MCNC: 0.3 MG/DL (ref 0–1)
BUN SERPL-MCNC: 18 MG/DL (ref 7–20)
CALCIUM SERPL-MCNC: 9.1 MG/DL (ref 8.3–10.6)
CHLORIDE SERPL-SCNC: 102 MMOL/L (ref 99–110)
CO2 SERPL-SCNC: 29 MMOL/L (ref 21–32)
CREAT SERPL-MCNC: 0.6 MG/DL (ref 0.8–1.3)
CRP SERPL-MCNC: 33.2 MG/L (ref 0–5.1)
DEPRECATED RDW RBC AUTO: 15.5 % (ref 12.4–15.4)
EOSINOPHIL # BLD: 0.1 K/UL (ref 0–0.6)
EOSINOPHIL NFR BLD: 0.7 %
ERYTHROCYTE [SEDIMENTATION RATE] IN BLOOD BY WESTERGREN METHOD: 61 MM/HR (ref 0–20)
GFR SERPLBLD CREATININE-BSD FMLA CKD-EPI: >90 ML/MIN/{1.73_M2}
GLUCOSE SERPL-MCNC: 220 MG/DL (ref 70–99)
HCT VFR BLD AUTO: 38.9 % (ref 40.5–52.5)
HGB BLD-MCNC: 13 G/DL (ref 13.5–17.5)
LYMPHOCYTES # BLD: 2.5 K/UL (ref 1–5.1)
LYMPHOCYTES NFR BLD: 33 %
MCH RBC QN AUTO: 29.7 PG (ref 26–34)
MCHC RBC AUTO-ENTMCNC: 33.3 G/DL (ref 31–36)
MCV RBC AUTO: 89.2 FL (ref 80–100)
MONOCYTES # BLD: 0.8 K/UL (ref 0–1.3)
MONOCYTES NFR BLD: 10.6 %
NEUTROPHILS # BLD: 4.2 K/UL (ref 1.7–7.7)
NEUTROPHILS NFR BLD: 55.4 %
PLATELET # BLD AUTO: 210 K/UL (ref 135–450)
PMV BLD AUTO: 8.6 FL (ref 5–10.5)
POTASSIUM SERPL-SCNC: 4.1 MMOL/L (ref 3.5–5.1)
PROT SERPL-MCNC: 6.3 G/DL (ref 6.4–8.2)
RBC # BLD AUTO: 4.36 M/UL (ref 4.2–5.9)
SODIUM SERPL-SCNC: 142 MMOL/L (ref 136–145)
WBC # BLD AUTO: 7.5 K/UL (ref 4–11)

## 2024-10-22 ENCOUNTER — TELEPHONE (OUTPATIENT)
Dept: INFECTIOUS DISEASES | Age: 69
End: 2024-10-22

## 2024-10-22 ENCOUNTER — HOSPITAL ENCOUNTER (OUTPATIENT)
Dept: WOUND CARE | Age: 69
Discharge: HOME | End: 2024-10-22
Attending: PODIATRIST
Payer: MEDICARE

## 2024-10-22 VITALS
SYSTOLIC BLOOD PRESSURE: 178 MMHG | TEMPERATURE: 98.5 F | RESPIRATION RATE: 16 BRPM | DIASTOLIC BLOOD PRESSURE: 81 MMHG | HEART RATE: 75 BPM

## 2024-10-22 DIAGNOSIS — L97.529 DIABETIC ULCER OF LEFT GREAT TOE (HCC): Primary | ICD-10-CM

## 2024-10-22 DIAGNOSIS — E11.621 DIABETIC ULCER OF LEFT GREAT TOE (HCC): Primary | ICD-10-CM

## 2024-10-22 DIAGNOSIS — Z16.24 MULTIPLE DRUG RESISTANT ORGANISM (MDRO) CULTURE POSITIVE: Primary | ICD-10-CM

## 2024-10-22 PROCEDURE — 99213 OFFICE O/P EST LOW 20 MIN: CPT

## 2024-10-22 RX ORDER — LIDOCAINE 40 MG/G
CREAM TOPICAL ONCE
OUTPATIENT
Start: 2024-10-22 | End: 2024-10-22

## 2024-10-22 RX ORDER — BETAMETHASONE DIPROPIONATE 0.5 MG/G
CREAM TOPICAL ONCE
OUTPATIENT
Start: 2024-10-22 | End: 2024-10-22

## 2024-10-22 RX ORDER — NEOMYCIN/BACITRACIN/POLYMYXINB 3.5-400-5K
OINTMENT (GRAM) TOPICAL ONCE
OUTPATIENT
Start: 2024-10-22 | End: 2024-10-22

## 2024-10-22 RX ORDER — BACITRACIN ZINC AND POLYMYXIN B SULFATE 500; 1000 [USP'U]/G; [USP'U]/G
OINTMENT TOPICAL ONCE
OUTPATIENT
Start: 2024-10-22 | End: 2024-10-22

## 2024-10-22 RX ORDER — CLOBETASOL PROPIONATE 0.5 MG/G
OINTMENT TOPICAL ONCE
OUTPATIENT
Start: 2024-10-22 | End: 2024-10-22

## 2024-10-22 RX ORDER — MUPIROCIN 20 MG/G
OINTMENT TOPICAL ONCE
OUTPATIENT
Start: 2024-10-22 | End: 2024-10-22

## 2024-10-22 RX ORDER — TRIAMCINOLONE ACETONIDE 1 MG/G
OINTMENT TOPICAL ONCE
OUTPATIENT
Start: 2024-10-22 | End: 2024-10-22

## 2024-10-22 RX ORDER — GINSENG 100 MG
CAPSULE ORAL ONCE
OUTPATIENT
Start: 2024-10-22 | End: 2024-10-22

## 2024-10-22 RX ORDER — SODIUM CHLOR/HYPOCHLOROUS ACID 0.033 %
SOLUTION, IRRIGATION IRRIGATION ONCE
OUTPATIENT
Start: 2024-10-22 | End: 2024-10-22

## 2024-10-22 RX ORDER — LIDOCAINE 50 MG/G
OINTMENT TOPICAL ONCE
OUTPATIENT
Start: 2024-10-22 | End: 2024-10-22

## 2024-10-22 RX ORDER — LIDOCAINE HYDROCHLORIDE 20 MG/ML
JELLY TOPICAL ONCE
OUTPATIENT
Start: 2024-10-22 | End: 2024-10-22

## 2024-10-22 RX ORDER — SILVER SULFADIAZINE 10 MG/G
CREAM TOPICAL ONCE
OUTPATIENT
Start: 2024-10-22 | End: 2024-10-22

## 2024-10-22 RX ORDER — GENTAMICIN SULFATE 1 MG/G
OINTMENT TOPICAL ONCE
OUTPATIENT
Start: 2024-10-22 | End: 2024-10-22

## 2024-10-22 RX ORDER — LIDOCAINE HYDROCHLORIDE 40 MG/ML
SOLUTION TOPICAL ONCE
OUTPATIENT
Start: 2024-10-22 | End: 2024-10-22

## 2024-10-22 ASSESSMENT — PAIN SCALES - GENERAL: PAINLEVEL_OUTOF10: 0

## 2024-10-22 NOTE — TELEPHONE ENCOUNTER
Heather pharmacist at [facility] verbalized under to Start IV Daptomycin x 500 mg x Q 24 hR X 2 weeks.     Pt verbalized understanding of plan and to hold Crestor while taking Dapto.

## 2024-10-22 NOTE — PROGRESS NOTES
If you are still having pain after you go home:  For wounds on lower legs or arms, elevate the affected limb.  Use over-the-counter medications you would normally use for pain as permitted by your primary care doctor.    For Persistent Pain not relieved by the above interventions, please notify your family doctor.     Seek immediate medical care if:    You have symptoms of infection, such as:  Increased pain, swelling, warmth, or redness.  Red streaks leading from the area.  Pus draining from the area.  A fever.     _______________________________________________________________________________________________    : JOY     Electronically signed by Joy Cisneros RN on 10/22/2024 at 2:22 PM      Wound Care Center Information: Should you experience any significant changes in your wound(s) or have questions about your wound care, please contact the [facility] at [phone].   MONDAY through THURSDAY 8:00 am - 4:30 pm.  Friday 8:00 am - 11:30 am.   The office is closed on all major holidays.   (Hours of operation are subject to change).     Please give us 24-48 business hours to return your call.  If you need help with your wounds and cannot wait until we are available, contact your Primary Care Physician or go to your preferred emergency room.      Physician Signature:_______________________    Date: ___________ Time:  ____________    Dr. Jimbo Bruno       Electronically signed by Jimbo Bruno DPM on 10/22/2024 at 2:31 PM

## 2024-10-22 NOTE — PATIENT INSTRUCTIONS
[facility] Wound Care Physician Orders and Discharge Instructions  [address], [address]        [place], [place] [address]  Telephone: [phone]      FAX [phone]  MONDAY - THURSDAY 8:30 am - 4:30 pm and Friday 8:30 am - 11:30 am      NAME:  Tony Boyle  YOB: 1955  MEDICAL RECORD NUMBER:  [id number]  DATE:  10/22/2024      Return Appointment:  [x] Return Appointment: With Dr. Jimbo Bruno  in  1  Week(s)             [] Nurse Visit for Wound Assessment:  [x] DME/Wound Dressing Supplies Provided by: [employer]    Please call them directly to reorder supplies when you run out)  [x] ECF or Home Healthcare:  [facility]  Your Home Care Agency is responsible to order your supplies.   [] Orders placed during your visit:      Continue IV antibiotics under the direction of Dr. Sheth      Important Reminders:   Please wash hands with soap and water before and after every dressing change.  If you smoke we ask that you refrain from smoking. Smoking inhibits wounds from healing.  When taking antibiotics take the entire prescription as ordered. Do not stop taking until medication is all gone unless otherwise instructed.   Do not get wounds wet in the bath or shower unless otherwise instructed by your physician. If your wound is on your foot or leg, you may purchase a cast bag. Please ask at your local pharmacy.   IF YOU ARE UNABLE TO OBTAIN WOUND SUPPLIES, CONTINUE TO USE THE SUPPLIES YOU HAVE AVAILABLE UNTIL YOU ARE ABLE TO REACH US. IT IS MOST IMPORTANT TO KEEP THE WOUND COVERED AT ALL TIMES.  ___________________________________________________________________    Wound Location: Left  Great Toe Amputation Site    WOUND CLEANSING:Normal Saline    Della-Wound Topical Treatments:   Apply immediately around the wound: Normal Saline    PRIMARY DRESSING: Paint with Betadine        SECONDARY DRESSING: Gauze & Roll Gauze    N/a not recommended at this time, pulses present

## 2024-10-22 NOTE — TELEPHONE ENCOUNTER
Dr Bruno was concerned about the foot appearance and more red and swollen wants us to evaluate for adding IV Vancomiycin or IV Daptomycin    He is on IV Meropenem for infection already    Start IV Daptomycin x 500 ng x Q 24 hR X 2 weeks is an option  - this will be his first dose - then have to hold his Crestor       If Daptomycin not covered Ok with IV Vancomycin x  1250 mg Q 12 HR - He has received IV  Vancomycin in the Hospital then add Vancomycin trough to the weekly labs

## 2024-10-26 ENCOUNTER — APPOINTMENT (OUTPATIENT)
Dept: GENERAL RADIOLOGY | Age: 69
DRG: 617 | End: 2024-10-26
Payer: MEDICARE

## 2024-10-26 ENCOUNTER — APPOINTMENT (OUTPATIENT)
Dept: CT IMAGING | Age: 69
DRG: 617 | End: 2024-10-26
Payer: MEDICARE

## 2024-10-26 ENCOUNTER — HOSPITAL ENCOUNTER (INPATIENT)
Age: 69
LOS: 5 days | Discharge: HOME HEALTH | DRG: 617 | End: 2024-10-31
Attending: EMERGENCY MEDICINE
Payer: MEDICARE

## 2024-10-26 DIAGNOSIS — L97.523 DIABETIC ULCER OF TOE OF LEFT FOOT ASSOCIATED WITH TYPE 2 DIABETES MELLITUS, WITH NECROSIS OF MUSCLE (HCC): ICD-10-CM

## 2024-10-26 DIAGNOSIS — L03.116 CELLULITIS OF LEFT FOOT: Primary | ICD-10-CM

## 2024-10-26 DIAGNOSIS — L03.032 CELLULITIS OF FIFTH TOE OF LEFT FOOT: ICD-10-CM

## 2024-10-26 DIAGNOSIS — E11.621 DIABETIC ULCER OF TOE OF LEFT FOOT ASSOCIATED WITH TYPE 2 DIABETES MELLITUS, WITH NECROSIS OF MUSCLE (HCC): ICD-10-CM

## 2024-10-26 DIAGNOSIS — M86.172 ACUTE OSTEOMYELITIS-ANKLE/FOOT, LEFT: ICD-10-CM

## 2024-10-26 PROBLEM — E11.628 TYPE 2 DIABETES MELLITUS WITH LEFT DIABETIC FOOT INFECTION (HCC): Status: ACTIVE | Noted: 2024-10-26

## 2024-10-26 PROBLEM — M86.9 OSTEOMYELITIS OF FIFTH TOE OF LEFT FOOT: Status: ACTIVE | Noted: 2024-10-26

## 2024-10-26 PROBLEM — K21.9 GASTROESOPHAGEAL REFLUX DISEASE WITHOUT ESOPHAGITIS: Status: ACTIVE | Noted: 2024-10-26

## 2024-10-26 PROBLEM — E66.09 CLASS 2 OBESITY DUE TO EXCESS CALORIES WITH BODY MASS INDEX (BMI) OF 35.0 TO 35.9 IN ADULT: Status: ACTIVE | Noted: 2024-10-26

## 2024-10-26 PROBLEM — R70.0 ELEVATED SED RATE: Status: ACTIVE | Noted: 2024-10-26

## 2024-10-26 PROBLEM — R79.82 CRP ELEVATED: Status: ACTIVE | Noted: 2024-10-26

## 2024-10-26 PROBLEM — L08.9 TYPE 2 DIABETES MELLITUS WITH LEFT DIABETIC FOOT INFECTION (HCC): Status: ACTIVE | Noted: 2024-10-26

## 2024-10-26 PROBLEM — B99.9 INFECTION REQUIRING CONTACT ISOLATION PRECAUTIONS: Status: ACTIVE | Noted: 2024-10-10

## 2024-10-26 PROBLEM — E66.812 CLASS 2 OBESITY DUE TO EXCESS CALORIES WITH BODY MASS INDEX (BMI) OF 35.0 TO 35.9 IN ADULT: Status: ACTIVE | Noted: 2024-10-26

## 2024-10-26 LAB
ALBUMIN SERPL-MCNC: 4 G/DL (ref 3.4–5)
ALBUMIN/GLOB SERPL: 1.3 {RATIO} (ref 1.1–2.2)
ALP SERPL-CCNC: 103 U/L (ref 40–129)
ALT SERPL-CCNC: 11 U/L (ref 10–40)
ANION GAP SERPL CALCULATED.3IONS-SCNC: 8 MMOL/L (ref 3–16)
AST SERPL-CCNC: 22 U/L (ref 15–37)
BASOPHILS # BLD: 0 K/UL (ref 0–0.2)
BASOPHILS NFR BLD: 0.3 %
BILIRUB SERPL-MCNC: <0.2 MG/DL (ref 0–1)
BILIRUB UR QL STRIP.AUTO: NEGATIVE
BUN SERPL-MCNC: 17 MG/DL (ref 7–20)
CALCIUM SERPL-MCNC: 9.8 MG/DL (ref 8.3–10.6)
CHLORIDE SERPL-SCNC: 103 MMOL/L (ref 99–110)
CK SERPL-CCNC: 38 U/L (ref 39–308)
CLARITY UR: CLEAR
CO2 SERPL-SCNC: 29 MMOL/L (ref 21–32)
COLOR UR: YELLOW
CREAT SERPL-MCNC: 0.7 MG/DL (ref 0.8–1.3)
CRP SERPL-MCNC: 14.4 MG/L (ref 0–5.1)
DEPRECATED RDW RBC AUTO: 15.3 % (ref 12.4–15.4)
EOSINOPHIL # BLD: 0.1 K/UL (ref 0–0.6)
EOSINOPHIL NFR BLD: 1.6 %
ERYTHROCYTE [SEDIMENTATION RATE] IN BLOOD BY WESTERGREN METHOD: 42 MM/HR (ref 0–20)
GFR SERPLBLD CREATININE-BSD FMLA CKD-EPI: >90 ML/MIN/{1.73_M2}
GLUCOSE BLD-MCNC: 79 MG/DL (ref 70–99)
GLUCOSE BLD-MCNC: 89 MG/DL (ref 70–99)
GLUCOSE SERPL-MCNC: 202 MG/DL (ref 70–99)
GLUCOSE UR STRIP.AUTO-MCNC: >=1000 MG/DL
HCT VFR BLD AUTO: 39.9 % (ref 40.5–52.5)
HGB BLD-MCNC: 13.5 G/DL (ref 13.5–17.5)
HGB UR QL STRIP.AUTO: NEGATIVE
KETONES UR STRIP.AUTO-MCNC: NEGATIVE MG/DL
LACTATE BLDV-SCNC: 0.9 MMOL/L (ref 0.4–1.9)
LACTATE BLDV-SCNC: 1 MMOL/L (ref 0.4–1.9)
LEUKOCYTE ESTERASE UR QL STRIP.AUTO: NEGATIVE
LYMPHOCYTES # BLD: 2.4 K/UL (ref 1–5.1)
LYMPHOCYTES NFR BLD: 45.5 %
MCH RBC QN AUTO: 30 PG (ref 26–34)
MCHC RBC AUTO-ENTMCNC: 33.9 G/DL (ref 31–36)
MCV RBC AUTO: 88.5 FL (ref 80–100)
MONOCYTES # BLD: 0.5 K/UL (ref 0–1.3)
MONOCYTES NFR BLD: 9.8 %
NEUTROPHILS # BLD: 2.2 K/UL (ref 1.7–7.7)
NEUTROPHILS NFR BLD: 42.8 %
NITRITE UR QL STRIP.AUTO: NEGATIVE
PERFORMED ON: NORMAL
PERFORMED ON: NORMAL
PH UR STRIP.AUTO: 5.5 [PH] (ref 5–8)
PLATELET # BLD AUTO: 240 K/UL (ref 135–450)
PMV BLD AUTO: 8.1 FL (ref 5–10.5)
POTASSIUM SERPL-SCNC: 4.3 MMOL/L (ref 3.5–5.1)
PROT SERPL-MCNC: 7.1 G/DL (ref 6.4–8.2)
PROT UR STRIP.AUTO-MCNC: NEGATIVE MG/DL
RBC # BLD AUTO: 4.51 M/UL (ref 4.2–5.9)
SODIUM SERPL-SCNC: 140 MMOL/L (ref 136–145)
SP GR UR STRIP.AUTO: 1.04 (ref 1–1.03)
UA COMPLETE W REFLEX CULTURE PNL UR: ABNORMAL
UA DIPSTICK W REFLEX MICRO PNL UR: ABNORMAL
URN SPEC COLLECT METH UR: ABNORMAL
UROBILINOGEN UR STRIP-ACNC: 0.2 E.U./DL
WBC # BLD AUTO: 5.2 K/UL (ref 4–11)

## 2024-10-26 PROCEDURE — 86140 C-REACTIVE PROTEIN: CPT

## 2024-10-26 PROCEDURE — 6370000000 HC RX 637 (ALT 250 FOR IP)

## 2024-10-26 PROCEDURE — 80053 COMPREHEN METABOLIC PANEL: CPT

## 2024-10-26 PROCEDURE — 73630 X-RAY EXAM OF FOOT: CPT

## 2024-10-26 PROCEDURE — 99223 1ST HOSP IP/OBS HIGH 75: CPT | Performed by: INTERNAL MEDICINE

## 2024-10-26 PROCEDURE — 36415 COLL VENOUS BLD VENIPUNCTURE: CPT

## 2024-10-26 PROCEDURE — 85652 RBC SED RATE AUTOMATED: CPT

## 2024-10-26 PROCEDURE — 83605 ASSAY OF LACTIC ACID: CPT

## 2024-10-26 PROCEDURE — 1200000000 HC SEMI PRIVATE

## 2024-10-26 PROCEDURE — 71046 X-RAY EXAM CHEST 2 VIEWS: CPT

## 2024-10-26 PROCEDURE — 81003 URINALYSIS AUTO W/O SCOPE: CPT

## 2024-10-26 PROCEDURE — 99285 EMERGENCY DEPT VISIT HI MDM: CPT

## 2024-10-26 PROCEDURE — 85025 COMPLETE CBC W/AUTO DIFF WBC: CPT

## 2024-10-26 PROCEDURE — 6360000004 HC RX CONTRAST MEDICATION: Performed by: EMERGENCY MEDICINE

## 2024-10-26 PROCEDURE — 2580000003 HC RX 258

## 2024-10-26 PROCEDURE — 74177 CT ABD & PELVIS W/CONTRAST: CPT

## 2024-10-26 PROCEDURE — 82550 ASSAY OF CK (CPK): CPT

## 2024-10-26 PROCEDURE — 6360000002 HC RX W HCPCS

## 2024-10-26 RX ORDER — SODIUM CHLORIDE 0.9 % (FLUSH) 0.9 %
5-40 SYRINGE (ML) INJECTION PRN
Status: DISCONTINUED | OUTPATIENT
Start: 2024-10-26 | End: 2024-10-31 | Stop reason: HOSPADM

## 2024-10-26 RX ORDER — ACETAMINOPHEN 325 MG/1
650 TABLET ORAL EVERY 6 HOURS PRN
Status: DISCONTINUED | OUTPATIENT
Start: 2024-10-26 | End: 2024-10-31 | Stop reason: HOSPADM

## 2024-10-26 RX ORDER — PROPRANOLOL HYDROCHLORIDE 40 MG/1
40 TABLET ORAL 2 TIMES DAILY
Status: DISCONTINUED | OUTPATIENT
Start: 2024-10-26 | End: 2024-10-31 | Stop reason: HOSPADM

## 2024-10-26 RX ORDER — VENLAFAXINE HYDROCHLORIDE 75 MG/1
75 CAPSULE, EXTENDED RELEASE ORAL
Status: DISCONTINUED | OUTPATIENT
Start: 2024-10-27 | End: 2024-10-31 | Stop reason: HOSPADM

## 2024-10-26 RX ORDER — TRAZODONE HYDROCHLORIDE 100 MG/1
200 TABLET ORAL NIGHTLY
Status: DISCONTINUED | OUTPATIENT
Start: 2024-10-26 | End: 2024-10-31 | Stop reason: HOSPADM

## 2024-10-26 RX ORDER — ONDANSETRON 4 MG/1
4 TABLET, ORALLY DISINTEGRATING ORAL EVERY 8 HOURS PRN
Status: DISCONTINUED | OUTPATIENT
Start: 2024-10-26 | End: 2024-10-31 | Stop reason: HOSPADM

## 2024-10-26 RX ORDER — PANTOPRAZOLE SODIUM 40 MG/1
40 TABLET, DELAYED RELEASE ORAL DAILY
Status: DISCONTINUED | OUTPATIENT
Start: 2024-10-27 | End: 2024-10-31 | Stop reason: HOSPADM

## 2024-10-26 RX ORDER — ROSUVASTATIN CALCIUM 10 MG/1
10 TABLET, COATED ORAL NIGHTLY
Status: DISCONTINUED | OUTPATIENT
Start: 2024-10-26 | End: 2024-10-31 | Stop reason: HOSPADM

## 2024-10-26 RX ORDER — MAGNESIUM SULFATE IN WATER 40 MG/ML
2000 INJECTION, SOLUTION INTRAVENOUS PRN
Status: DISCONTINUED | OUTPATIENT
Start: 2024-10-26 | End: 2024-10-31 | Stop reason: HOSPADM

## 2024-10-26 RX ORDER — VITAMIN B COMPLEX
2000 TABLET ORAL DAILY
Status: DISCONTINUED | OUTPATIENT
Start: 2024-10-27 | End: 2024-10-31 | Stop reason: HOSPADM

## 2024-10-26 RX ORDER — POLYETHYLENE GLYCOL 3350 17 G/17G
17 POWDER, FOR SOLUTION ORAL DAILY PRN
Status: DISCONTINUED | OUTPATIENT
Start: 2024-10-26 | End: 2024-10-31 | Stop reason: HOSPADM

## 2024-10-26 RX ORDER — INSULIN LISPRO 100 [IU]/ML
0-4 INJECTION, SOLUTION INTRAVENOUS; SUBCUTANEOUS
Status: DISCONTINUED | OUTPATIENT
Start: 2024-10-26 | End: 2024-10-31 | Stop reason: HOSPADM

## 2024-10-26 RX ORDER — SODIUM CHLORIDE 9 MG/ML
INJECTION, SOLUTION INTRAVENOUS PRN
Status: DISCONTINUED | OUTPATIENT
Start: 2024-10-26 | End: 2024-10-31 | Stop reason: HOSPADM

## 2024-10-26 RX ORDER — IOPAMIDOL 755 MG/ML
75 INJECTION, SOLUTION INTRAVASCULAR
Status: COMPLETED | OUTPATIENT
Start: 2024-10-26 | End: 2024-10-26

## 2024-10-26 RX ORDER — ENOXAPARIN SODIUM 100 MG/ML
30 INJECTION SUBCUTANEOUS 2 TIMES DAILY
Status: DISCONTINUED | OUTPATIENT
Start: 2024-10-26 | End: 2024-10-31 | Stop reason: HOSPADM

## 2024-10-26 RX ORDER — ONDANSETRON 2 MG/ML
4 INJECTION INTRAMUSCULAR; INTRAVENOUS EVERY 6 HOURS PRN
Status: DISCONTINUED | OUTPATIENT
Start: 2024-10-26 | End: 2024-10-31 | Stop reason: HOSPADM

## 2024-10-26 RX ORDER — POTASSIUM CHLORIDE 7.45 MG/ML
10 INJECTION INTRAVENOUS PRN
Status: ACTIVE | OUTPATIENT
Start: 2024-10-26 | End: 2024-10-27

## 2024-10-26 RX ORDER — INSULIN GLARGINE 100 [IU]/ML
70 INJECTION, SOLUTION SUBCUTANEOUS DAILY
Status: DISCONTINUED | OUTPATIENT
Start: 2024-10-27 | End: 2024-10-28

## 2024-10-26 RX ORDER — POTASSIUM CHLORIDE 1500 MG/1
40 TABLET, EXTENDED RELEASE ORAL PRN
Status: ACTIVE | OUTPATIENT
Start: 2024-10-26 | End: 2024-10-27

## 2024-10-26 RX ORDER — LISINOPRIL 20 MG/1
20 TABLET ORAL DAILY
Status: DISCONTINUED | OUTPATIENT
Start: 2024-10-27 | End: 2024-10-31 | Stop reason: HOSPADM

## 2024-10-26 RX ORDER — SODIUM CHLORIDE 0.9 % (FLUSH) 0.9 %
5-40 SYRINGE (ML) INJECTION EVERY 12 HOURS SCHEDULED
Status: DISCONTINUED | OUTPATIENT
Start: 2024-10-26 | End: 2024-10-31 | Stop reason: HOSPADM

## 2024-10-26 RX ORDER — AMLODIPINE BESYLATE 5 MG/1
5 TABLET ORAL DAILY
Status: DISCONTINUED | OUTPATIENT
Start: 2024-10-27 | End: 2024-10-31 | Stop reason: HOSPADM

## 2024-10-26 RX ORDER — CLONAZEPAM 1 MG/1
1 TABLET ORAL NIGHTLY
Status: DISCONTINUED | OUTPATIENT
Start: 2024-10-26 | End: 2024-10-31 | Stop reason: HOSPADM

## 2024-10-26 RX ORDER — ACETAMINOPHEN 650 MG/1
650 SUPPOSITORY RECTAL EVERY 6 HOURS PRN
Status: DISCONTINUED | OUTPATIENT
Start: 2024-10-26 | End: 2024-10-31 | Stop reason: HOSPADM

## 2024-10-26 RX ORDER — TADALAFIL 5 MG/1
5 TABLET ORAL DAILY
Status: DISCONTINUED | OUTPATIENT
Start: 2024-10-27 | End: 2024-10-31 | Stop reason: HOSPADM

## 2024-10-26 RX ADMIN — MEROPENEM 1000 MG: 1 INJECTION INTRAVENOUS at 17:03

## 2024-10-26 RX ADMIN — ENOXAPARIN SODIUM 30 MG: 100 INJECTION SUBCUTANEOUS at 22:27

## 2024-10-26 RX ADMIN — IOPAMIDOL 75 ML: 755 INJECTION, SOLUTION INTRAVENOUS at 13:05

## 2024-10-26 RX ADMIN — TRAZODONE HYDROCHLORIDE 200 MG: 100 TABLET ORAL at 22:27

## 2024-10-26 RX ADMIN — CLONAZEPAM 1 MG: 1 TABLET ORAL at 22:27

## 2024-10-26 RX ADMIN — PROPRANOLOL HYDROCHLORIDE 40 MG: 40 TABLET ORAL at 22:27

## 2024-10-26 RX ADMIN — MEROPENEM 1000 MG: 1 INJECTION INTRAVENOUS at 22:33

## 2024-10-26 ASSESSMENT — PAIN SCALES - GENERAL
PAINLEVEL_OUTOF10: 5
PAINLEVEL_OUTOF10: 0

## 2024-10-26 NOTE — ED NOTES
Patient has PICC line in place from home, Updated Dr. Topete, new order for chest xray to confirm placement received and implemented.

## 2024-10-26 NOTE — H&P
V2.0  History and Physical      Name:  Tony Boyle /Age/Sex: 1955  (68 y.o. male)   MRN & CSN:  [id number] & [id number] Encounter Date/Time: 10/26/2024 3:29 PM EDT   Location:   PCP: Hebert Bolivar MD       Hospital Day: 1    Assessment and Plan:   Tony Boyle is a 68 y.o. male  who presents with Osteomyelitis of fifth toe of left foot    Plan:  1. Left foot cellulitis : Recently discharged on 10/19 with IV meropenem till  for osteomyelitis of left fifth toe. Followed with podiatry 10/22 and were concerned about worsening cellulitis. Infectious disease added IV daptomycin on 10/22. XR foot negative for osteomyelitis. Wound culture ordered. Inflammatory markers elevated but at baseline. ID, podiatry and wound care consulted    2. LLQ abdominal pain : CT abdomen showing bilateral perinephric stranding. Improved currently. UA ordered. If concern for pyelonephritis will be covered by IV antibiotics    3.  Type 2 diabetes mellitus : Resume home dose of long acting insuln and sliding scale insulin for now and monitor glucose levels    4. Essential hypertension : Resumed home medications    5. Dyslipidemia : Resume home statin    6. GERD : Resume home protonix    7. DVT prophylaxis : Lovenox    Disposition:     Diet ADULT DIET; Regular   DVT Prophylaxis [] Lovenox, []  Heparin, [] SCDs, [] Ambulation,  [] Eliquis, [] Xarelto, [] Coumadin   Code Status Full Code         Personally reviewed Lab Studies and Imaging     Discussed management of the case with ED who recommended admission    Imaging that was interpreted personally includes foot Xray and results no osteomyelitis      History from:     patient    History of Present Illness:     Chief Complaint:   Tony Boyle is a 68 y.o. male with pmh of osteomyelitis of left fifth toe,GERD, hypertension who presents with chief complaints of left foot redness that is getting worse. He saw his podiatrist on 10/22 and his left foot area was

## 2024-10-26 NOTE — ED PROVIDER NOTES
5.1 K/uL    Monocytes Absolute 0.7 0.0 - 1.3 K/uL    Eosinophils Absolute 0.1 0.0 - 0.6 K/uL    Basophils Absolute 0.1 0.0 - 0.2 K/uL   Magnesium   Result Value Ref Range    Magnesium 2.11 1.80 - 2.40 mg/dL   POCT Glucose   Result Value Ref Range    POC Glucose 79 70 - 99 mg/dl    Performed on ACCU-CHEK    POCT Glucose   Result Value Ref Range    POC Glucose 89 70 - 99 mg/dl    Performed on ACCU-CHEK    POCT Glucose   Result Value Ref Range    POC Glucose 72 70 - 99 mg/dl    Performed on ACCU-CHEK    POCT Glucose   Result Value Ref Range    POC Glucose 104 (H) 70 - 99 mg/dl    Performed on [id number]          RADIOLOGY  I have reviewed all radiographic studies for this visit.   XR CHEST (2 VW)   Final Result   No acute cardiopulmonary process. PICC line in place with distal tip   overlying the superior vena cava..         CT ABDOMEN PELVIS W IV CONTRAST Additional Contrast? None   Final Result   Bilateral perinephric stranding is seen which can be a chronic finding though   if acute could indicate pyelonephritis.  Suggest correlation with urinalysis.      8 mm left lower lobe pulmonary nodule.  Follow-up recommendations are as   below.      Diverticulosis, without cecilia diverticulitis.      RECOMMENDATIONS:   Left solid pulmonary nodule measuring 8 mm. Per Fleischner Society   Guidelines, recommend a non-contrast Chest CT at 6-12 months. If patient is   high risk for malignancy, consider an additional non-contrast Chest CT at   18-24 months. If patient is low risk for malignancy, non-contrast Chest CT at   18-24 months is optional.      These guidelines do not apply to immunocompromised patients and patients with   cancer. Follow up in patients with significant comorbidities as clinically   warranted. For lung cancer screening, adhere to Lung-RADS guidelines.   Reference: Radiology. 2017; 284(1):228-43.            XR FOOT LEFT (MIN 3 VIEWS)   Final Result   Partial amputation of the great toe at the distal 1st

## 2024-10-27 LAB
ANION GAP SERPL CALCULATED.3IONS-SCNC: 8 MMOL/L (ref 3–16)
BASOPHILS # BLD: 0.1 K/UL (ref 0–0.2)
BASOPHILS NFR BLD: 0.8 %
BUN SERPL-MCNC: 13 MG/DL (ref 7–20)
CALCIUM SERPL-MCNC: 9.2 MG/DL (ref 8.3–10.6)
CHLORIDE SERPL-SCNC: 103 MMOL/L (ref 99–110)
CO2 SERPL-SCNC: 31 MMOL/L (ref 21–32)
CREAT SERPL-MCNC: 0.6 MG/DL (ref 0.8–1.3)
DEPRECATED RDW RBC AUTO: 15.1 % (ref 12.4–15.4)
EOSINOPHIL # BLD: 0.1 K/UL (ref 0–0.6)
EOSINOPHIL NFR BLD: 1.6 %
GFR SERPLBLD CREATININE-BSD FMLA CKD-EPI: >90 ML/MIN/{1.73_M2}
GLUCOSE BLD-MCNC: 104 MG/DL (ref 70–99)
GLUCOSE BLD-MCNC: 133 MG/DL (ref 70–99)
GLUCOSE BLD-MCNC: 213 MG/DL (ref 70–99)
GLUCOSE BLD-MCNC: 72 MG/DL (ref 70–99)
GLUCOSE SERPL-MCNC: 60 MG/DL (ref 70–99)
HCT VFR BLD AUTO: 38.2 % (ref 40.5–52.5)
HGB BLD-MCNC: 13.1 G/DL (ref 13.5–17.5)
LYMPHOCYTES # BLD: 2.8 K/UL (ref 1–5.1)
LYMPHOCYTES NFR BLD: 44.5 %
MAGNESIUM SERPL-MCNC: 2.11 MG/DL (ref 1.8–2.4)
MCH RBC QN AUTO: 30 PG (ref 26–34)
MCHC RBC AUTO-ENTMCNC: 34.4 G/DL (ref 31–36)
MCV RBC AUTO: 87.3 FL (ref 80–100)
MONOCYTES # BLD: 0.7 K/UL (ref 0–1.3)
MONOCYTES NFR BLD: 11.4 %
NEUTROPHILS # BLD: 2.6 K/UL (ref 1.7–7.7)
NEUTROPHILS NFR BLD: 41.7 %
PERFORMED ON: ABNORMAL
PERFORMED ON: NORMAL
PLATELET # BLD AUTO: 249 K/UL (ref 135–450)
PMV BLD AUTO: 7.7 FL (ref 5–10.5)
POTASSIUM SERPL-SCNC: 3.5 MMOL/L (ref 3.5–5.1)
RBC # BLD AUTO: 4.38 M/UL (ref 4.2–5.9)
SODIUM SERPL-SCNC: 142 MMOL/L (ref 136–145)
WBC # BLD AUTO: 6.2 K/UL (ref 4–11)

## 2024-10-27 PROCEDURE — 6360000002 HC RX W HCPCS

## 2024-10-27 PROCEDURE — 1200000000 HC SEMI PRIVATE

## 2024-10-27 PROCEDURE — 80048 BASIC METABOLIC PNL TOTAL CA: CPT

## 2024-10-27 PROCEDURE — 83735 ASSAY OF MAGNESIUM: CPT

## 2024-10-27 PROCEDURE — 94760 N-INVAS EAR/PLS OXIMETRY 1: CPT

## 2024-10-27 PROCEDURE — 6370000000 HC RX 637 (ALT 250 FOR IP)

## 2024-10-27 PROCEDURE — 87070 CULTURE OTHR SPECIMN AEROBIC: CPT

## 2024-10-27 PROCEDURE — 2580000003 HC RX 258

## 2024-10-27 PROCEDURE — 85025 COMPLETE CBC W/AUTO DIFF WBC: CPT

## 2024-10-27 PROCEDURE — 87205 SMEAR GRAM STAIN: CPT

## 2024-10-27 RX ORDER — GLUCAGON 1 MG/ML
1 KIT INJECTION PRN
Status: DISCONTINUED | OUTPATIENT
Start: 2024-10-27 | End: 2024-10-31 | Stop reason: HOSPADM

## 2024-10-27 RX ORDER — DEXTROSE MONOHYDRATE 100 MG/ML
INJECTION, SOLUTION INTRAVENOUS CONTINUOUS PRN
Status: DISCONTINUED | OUTPATIENT
Start: 2024-10-27 | End: 2024-10-31 | Stop reason: HOSPADM

## 2024-10-27 RX ADMIN — PANTOPRAZOLE SODIUM 40 MG: 40 TABLET, DELAYED RELEASE ORAL at 09:15

## 2024-10-27 RX ADMIN — MEROPENEM 1000 MG: 1 INJECTION INTRAVENOUS at 14:52

## 2024-10-27 RX ADMIN — DAPTOMYCIN 750 MG: 500 INJECTION, POWDER, LYOPHILIZED, FOR SOLUTION INTRAVENOUS at 09:28

## 2024-10-27 RX ADMIN — ENOXAPARIN SODIUM 30 MG: 100 INJECTION SUBCUTANEOUS at 09:15

## 2024-10-27 RX ADMIN — PROPRANOLOL HYDROCHLORIDE 40 MG: 40 TABLET ORAL at 20:19

## 2024-10-27 RX ADMIN — CLONAZEPAM 1 MG: 1 TABLET ORAL at 20:19

## 2024-10-27 RX ADMIN — INSULIN GLARGINE 70 UNITS: 100 INJECTION, SOLUTION SUBCUTANEOUS at 09:15

## 2024-10-27 RX ADMIN — LISINOPRIL 20 MG: 20 TABLET ORAL at 09:15

## 2024-10-27 RX ADMIN — Medication 2000 UNITS: at 09:15

## 2024-10-27 RX ADMIN — ENOXAPARIN SODIUM 30 MG: 100 INJECTION SUBCUTANEOUS at 20:19

## 2024-10-27 RX ADMIN — VENLAFAXINE HYDROCHLORIDE 75 MG: 37.5 CAPSULE, EXTENDED RELEASE ORAL at 09:15

## 2024-10-27 RX ADMIN — TRAZODONE HYDROCHLORIDE 200 MG: 100 TABLET ORAL at 20:19

## 2024-10-27 RX ADMIN — PROPRANOLOL HYDROCHLORIDE 40 MG: 40 TABLET ORAL at 09:15

## 2024-10-27 RX ADMIN — SODIUM CHLORIDE, PRESERVATIVE FREE 10 ML: 5 INJECTION INTRAVENOUS at 09:16

## 2024-10-27 RX ADMIN — AMLODIPINE BESYLATE 5 MG: 5 TABLET ORAL at 09:15

## 2024-10-27 RX ADMIN — MEROPENEM 1000 MG: 1 INJECTION INTRAVENOUS at 06:26

## 2024-10-27 ASSESSMENT — PAIN SCALES - WONG BAKER: WONGBAKER_NUMERICALRESPONSE: NO HURT

## 2024-10-27 ASSESSMENT — PAIN SCALES - GENERAL: PAINLEVEL_OUTOF10: 0

## 2024-10-27 NOTE — CARE COORDINATION
10/27/24 1010   Readmission Assessment   Number of Days since last admission? 8-30 days   Previous Disposition Home with Home Health  ([facility] and [facility] for [facility] atb)   Who is being Interviewed Patient;Caregiver   What was the patient's/caregiver's perception as to why they think they needed to return back to the hospital? Other (Comment)  (increased redness)   Did you visit your Primary Care Physician after you left the hospital, before you returned this time? No   Why weren't you able to visit your PCP? Did not have an appointment   Did you see a specialist, such as Cardiac, Pulmonary, Orthopedic Physician, etc. after you left the hospital? Yes  (wound care)   Who advised the patient to return to the hospital? Self-referral;Caregiver   Does the patient report anything that got in the way of taking their medications? No   In our efforts to provide the best possible care to you and others like you, can you think of anything that we could have done to help you after you left the hospital the first time, so that you might not have needed to return so soon? Other (Comment)  (no)     #[phone]  Electronically signed by Neelam Waggoner RN on 10/27/2024 at 10:12 AM

## 2024-10-27 NOTE — ACP (ADVANCE CARE PLANNING)
Advance Care Planning     Advance Care Planning Activator (Inpatient)  Conversation Note      Date of ACP Conversation: 10/27/2024     Conversation Conducted with: Patient with Decision Making Capacity; Medical Record    ACP Activator: Neelam Waggoner RN    Health Care Decision Maker:     Current Designated Health Care Decision Maker:     Primary Decision Maker: Sylvie Ferreira - Child - [phone]    Secondary Decision Maker: Janie Boyle - Child - [phone]    Today we documented Decision Maker(s) consistent with Legal Next of Kin hierarchy.    Care Preferences    Ventilation:  \"If you were in your present state of health and suddenly became very ill and were unable to breathe on your own, what would your preference be about the use of a ventilator (breathing machine) if it were available to you?\"      Would the patient desire the use of ventilator (breathing machine)?: yes    \"If your health worsens and it becomes clear that your chance of recovery is unlikely, what would your preference be about the use of a ventilator (breathing machine) if it were available to you?\"     Would the patient desire the use of ventilator (breathing machine)?: Yes      Resuscitation  \"CPR works best to restart the heart when there is a sudden event, like a heart attack, in someone who is otherwise healthy. Unfortunately, CPR does not typically restart the heart for people who have serious health conditions or who are very sick.\"    \"In the event your heart stopped as a result of an underlying serious health condition, would you want attempts to be made to restart your heart (answer \"yes\" for attempt to resuscitate) or would you prefer a natural death (answer \"no\" for do not attempt to resuscitate)?\" yes       [] Yes   [] No   Educated Patient / Decision Maker regarding differences between Advance Directives and portable DNR orders.    Length of ACP Conversation in minutes:      Conversation Outcomes:  ACP discussion

## 2024-10-27 NOTE — CARE COORDINATION
Case Management Assessment  Initial Evaluation    Date/Time of Evaluation: 10/27/2024 10:15 AM  Assessment Completed by: Neelam Waggoner RN    If patient is discharged prior to next notation, then this note serves as note for discharge by case management.    Patient Name: Tony Boyle                   YOB: 1955  Diagnosis: Cellulitis of fifth toe of left foot [L03.032]  Cellulitis of left foot [L03.116]  Osteomyelitis of fifth toe of left foot [M86.9]                   Date / Time: 10/26/2024 11:32 AM    Patient Admission Status: Inpatient   Readmission Risk (Low < 19, Mod (19-27), High > 27): Readmission Risk Score: 17.6    Current PCP: Hebert Bolivar MD  PCP verified by CM? Yes    Chart Reviewed: Yes      History Provided by: Medical Record, Patient  Patient Orientation: Alert and Oriented    Patient Cognition: Alert    Hospitalization in the last 30 days (Readmission):  Yes    If yes, Readmission Assessment in CM Navigator will be completed.    Advance Directives:      Code Status: Full Code   Patient's Primary Decision Maker is: Legal Next of Kin    Primary Decision Maker: Catracho Ferreiracy - Child - [phone]    Secondary Decision Maker: Janie Boyle - Child - [phone]    Discharge Planning:    Patient lives with: Children Type of Home: House  Primary Care Giver: Self  Patient Support Systems include: Children, Family Members   Current Financial resources: Medicare  Current community resources: ECF/Home Care  Current services prior to admission: Durable Medical Equipment, Home Care            Current DME: Walker            Type of Home Care services:  Nursing Services    ADLS  Prior functional level: Assistance with the following:, Mobility  Current functional level: Assistance with the following:, Mobility    No current PT/OT orders    Family can provide assistance at DC: Yes  Would you like Case Management to discuss the discharge plan with any other family members/significant

## 2024-10-28 PROBLEM — M86.9 TOE OSTEOMYELITIS, LEFT: Status: ACTIVE | Noted: 2024-10-28

## 2024-10-28 PROBLEM — Z79.2 RECEIVING INTRAVENOUS ANTIBIOTIC TREATMENT AS OUTPATIENT: Status: ACTIVE | Noted: 2024-10-28

## 2024-10-28 LAB
ABO + RH BLD: NORMAL
ANION GAP SERPL CALCULATED.3IONS-SCNC: 12 MMOL/L (ref 3–16)
BASOPHILS # BLD: 0 K/UL (ref 0–0.2)
BASOPHILS NFR BLD: 0.2 %
BLD GP AB SCN SERPL QL: NORMAL
BUN SERPL-MCNC: 13 MG/DL (ref 7–20)
CALCIUM SERPL-MCNC: 9 MG/DL (ref 8.3–10.6)
CHLORIDE SERPL-SCNC: 106 MMOL/L (ref 99–110)
CO2 SERPL-SCNC: 28 MMOL/L (ref 21–32)
CREAT SERPL-MCNC: 0.6 MG/DL (ref 0.8–1.3)
DEPRECATED RDW RBC AUTO: 15.2 % (ref 12.4–15.4)
EOSINOPHIL # BLD: 0.1 K/UL (ref 0–0.6)
EOSINOPHIL NFR BLD: 1.4 %
GFR SERPLBLD CREATININE-BSD FMLA CKD-EPI: >90 ML/MIN/{1.73_M2}
GLUCOSE BLD-MCNC: 107 MG/DL (ref 70–99)
GLUCOSE BLD-MCNC: 111 MG/DL (ref 70–99)
GLUCOSE BLD-MCNC: 133 MG/DL (ref 70–99)
GLUCOSE BLD-MCNC: 72 MG/DL (ref 70–99)
GLUCOSE SERPL-MCNC: 62 MG/DL (ref 70–99)
HCT VFR BLD AUTO: 38.1 % (ref 40.5–52.5)
HGB BLD-MCNC: 12.6 G/DL (ref 13.5–17.5)
INR PPP: 0.95 (ref 0.85–1.15)
LYMPHOCYTES # BLD: 2.3 K/UL (ref 1–5.1)
LYMPHOCYTES NFR BLD: 36.9 %
MCH RBC QN AUTO: 29.5 PG (ref 26–34)
MCHC RBC AUTO-ENTMCNC: 33.1 G/DL (ref 31–36)
MCV RBC AUTO: 89 FL (ref 80–100)
MONOCYTES # BLD: 0.8 K/UL (ref 0–1.3)
MONOCYTES NFR BLD: 12.3 %
NEUTROPHILS # BLD: 3 K/UL (ref 1.7–7.7)
NEUTROPHILS NFR BLD: 49.2 %
PERFORMED ON: ABNORMAL
PERFORMED ON: NORMAL
PLATELET # BLD AUTO: 240 K/UL (ref 135–450)
PMV BLD AUTO: 7.9 FL (ref 5–10.5)
POTASSIUM SERPL-SCNC: 3.7 MMOL/L (ref 3.5–5.1)
PROTHROMBIN TIME: 12.9 SEC (ref 11.9–14.9)
RBC # BLD AUTO: 4.27 M/UL (ref 4.2–5.9)
SODIUM SERPL-SCNC: 146 MMOL/L (ref 136–145)
WBC # BLD AUTO: 6.2 K/UL (ref 4–11)

## 2024-10-28 PROCEDURE — 85610 PROTHROMBIN TIME: CPT

## 2024-10-28 PROCEDURE — 94760 N-INVAS EAR/PLS OXIMETRY 1: CPT

## 2024-10-28 PROCEDURE — 2580000003 HC RX 258

## 2024-10-28 PROCEDURE — 1200000000 HC SEMI PRIVATE

## 2024-10-28 PROCEDURE — 36415 COLL VENOUS BLD VENIPUNCTURE: CPT

## 2024-10-28 PROCEDURE — 86850 RBC ANTIBODY SCREEN: CPT

## 2024-10-28 PROCEDURE — 80048 BASIC METABOLIC PNL TOTAL CA: CPT

## 2024-10-28 PROCEDURE — 6370000000 HC RX 637 (ALT 250 FOR IP)

## 2024-10-28 PROCEDURE — 99233 SBSQ HOSP IP/OBS HIGH 50: CPT | Performed by: INTERNAL MEDICINE

## 2024-10-28 PROCEDURE — 6360000002 HC RX W HCPCS

## 2024-10-28 PROCEDURE — 85025 COMPLETE CBC W/AUTO DIFF WBC: CPT

## 2024-10-28 PROCEDURE — 86900 BLOOD TYPING SEROLOGIC ABO: CPT

## 2024-10-28 PROCEDURE — 86901 BLOOD TYPING SEROLOGIC RH(D): CPT

## 2024-10-28 RX ORDER — INSULIN GLARGINE 100 [IU]/ML
56 INJECTION, SOLUTION SUBCUTANEOUS DAILY
Status: DISCONTINUED | OUTPATIENT
Start: 2024-10-28 | End: 2024-10-29

## 2024-10-28 RX ADMIN — PROPRANOLOL HYDROCHLORIDE 40 MG: 40 TABLET ORAL at 20:08

## 2024-10-28 RX ADMIN — DAPTOMYCIN 750 MG: 500 INJECTION, POWDER, LYOPHILIZED, FOR SOLUTION INTRAVENOUS at 10:37

## 2024-10-28 RX ADMIN — AMLODIPINE BESYLATE 5 MG: 5 TABLET ORAL at 08:59

## 2024-10-28 RX ADMIN — LISINOPRIL 20 MG: 20 TABLET ORAL at 08:59

## 2024-10-28 RX ADMIN — PROPRANOLOL HYDROCHLORIDE 40 MG: 40 TABLET ORAL at 08:59

## 2024-10-28 RX ADMIN — CLONAZEPAM 1 MG: 1 TABLET ORAL at 20:08

## 2024-10-28 RX ADMIN — SODIUM CHLORIDE, PRESERVATIVE FREE 10 ML: 5 INJECTION INTRAVENOUS at 09:00

## 2024-10-28 RX ADMIN — Medication 2000 UNITS: at 09:00

## 2024-10-28 RX ADMIN — MEROPENEM 1000 MG: 1 INJECTION INTRAVENOUS at 22:00

## 2024-10-28 RX ADMIN — INSULIN GLARGINE 56 UNITS: 100 INJECTION, SOLUTION SUBCUTANEOUS at 09:00

## 2024-10-28 RX ADMIN — MEROPENEM 1000 MG: 1 INJECTION INTRAVENOUS at 16:00

## 2024-10-28 RX ADMIN — PANTOPRAZOLE SODIUM 40 MG: 40 TABLET, DELAYED RELEASE ORAL at 08:59

## 2024-10-28 RX ADMIN — MEROPENEM 1000 MG: 1 INJECTION INTRAVENOUS at 09:00

## 2024-10-28 RX ADMIN — ENOXAPARIN SODIUM 30 MG: 100 INJECTION SUBCUTANEOUS at 20:07

## 2024-10-28 RX ADMIN — ENOXAPARIN SODIUM 30 MG: 100 INJECTION SUBCUTANEOUS at 09:00

## 2024-10-28 RX ADMIN — MEROPENEM 1000 MG: 1 INJECTION INTRAVENOUS at 01:07

## 2024-10-28 RX ADMIN — TRAZODONE HYDROCHLORIDE 200 MG: 100 TABLET ORAL at 20:08

## 2024-10-28 ASSESSMENT — PAIN SCALES - GENERAL: PAINLEVEL_OUTOF10: 0

## 2024-10-28 NOTE — DISCHARGE INSTRUCTIONS
American Diabetes Association:     About Diabetes: https://diabetes.org/about-diabetes     Life with Diabetes: https://diabetes.org/living-with-diabetes     Health & Wellness: https://diabetes.org/health-wellness     Food & Nutrition: https://diabetes.org/food-nutrition     Tools & Resources: https://diabetes.org/tools-resources  ____________________________________________________    [employer] Customer Service [phone]    Call if your CGM ([employer]) falls off or if you have testing including a CT or MRI to request a replacement be sent to you. It usually arrives within 3-5 business days after calling and requesting.  ____________________________________________________    You can look up DiabetesReliefFoundation.net (add appt to your phone)     They offer meetings/get together's for individuals who have diabetes to learn more about support for individuals with diabetes, meetings are usually held at [facility]  ____________________________________________________

## 2024-10-28 NOTE — CONSULTS
(267 lb 10.2 oz)   SpO2 95%   BMI 35.31 kg/m²     LABS:   Recent Labs     10/27/24  0500 10/28/24  0530   WBC 6.2 6.2   HGB 13.1* 12.6*   HCT 38.2* 38.1*    240     Recent Labs     10/28/24  0530   *   K 3.7      CO2 28   BUN 13   CREATININE 0.6*     No results for input(s): \"INR\", \"APTT\" in the last 72 hours.    Invalid input(s): \"PROT\"    NEUROLOGIC:    Pain sensation is decreased Bilateral.  Light touch is decreased Bilateral. positive history of paresthesia Bilateral. Negative history of burning Bilateral. Deep tendon reflexes are 2 to include patellar and achilles Bilateral. [place]--Brianne 5.07 monofilament sensitivity is abnormal 2 to 5 spots tested Bilateral.     VASCULAR:    B/l DP & PT palpable 2/2. 1+ edema left. mild Varicosities bilaterally.      MUSCULOSKELETAL:  Muscle strength is 5/5 to all groups tested to include dorsiflexion, plantarflexion, inversion, eversion, and digital Bilateral . The ranges of motion of all joints tested from ankle distal is decreased there is no crepitus noted b/l.    Verbal consent obtained for photos today:    Right lower extremity:  RIGHT hallux old heme beneath hyperkeratotic callus of medial great toe, no signs of infection. Unchanged.     Left lower extremity:  Left hallux amputation site incision is well-approximated and coapted.  There is no evidence of dehiscence noted.  There is persistent erythema noted, improved significantly since admission.  Edema is appropriate for the level of surgical intervention. Dried betadine overlying prior amp site of the left hallux. No fluctuance.         MUSCULOSKELETAL: Muscle strength is 5/5 for all pedal groups tested. No pain with palpation of the foot or ankle b/l. Ankle joint ROM is decreased in dorsiflexion with the knee extended. No obvious biomechanical abnormalities.     IMAGING:  Vascular duplex lower extremity arteries left (pending)    XR left foot (10/26/2024)   FINDINGS:  The patient has had 
16 15   Temp:       TempSrc:       SpO2: 95% 96%     Weight:           Physical Exam  Vitals and nursing note reviewed.   Constitutional:       Appearance: He is well-developed. He is not diaphoretic.      Comments: The patient was seen earlier today.   HENT:      Head: Normocephalic and atraumatic.      Right Ear: External ear normal. There is no impacted cerumen.      Left Ear: External ear normal. There is no impacted cerumen.      Nose: Nose normal.      Mouth/Throat:      Mouth: Mucous membranes are moist.      Pharynx: Oropharynx is clear. No oropharyngeal exudate.   Eyes:      General: No scleral icterus.        Right eye: No discharge.         Left eye: No discharge.      Conjunctiva/sclera: Conjunctivae normal.      Pupils: Pupils are equal, round, and reactive to light.   Neck:      Thyroid: No thyromegaly.   Cardiovascular:      Rate and Rhythm: Normal rate and regular rhythm.      Heart sounds: Normal heart sounds. No murmur heard.     No friction rub.   Pulmonary:      Effort: No respiratory distress.      Breath sounds: No stridor. No wheezing or rales.   Abdominal:      General: Bowel sounds are normal.      Palpations: Abdomen is soft.      Tenderness: There is no abdominal tenderness. There is no guarding or rebound.   Musculoskeletal:         General: Swelling (left lower leg) present. No tenderness or deformity. Normal range of motion.      Cervical back: Normal range of motion and neck supple.      Right lower leg: No edema.      Left lower leg: No edema.   Lymphadenopathy:      Cervical: No cervical adenopathy.   Skin:     General: Skin is warm and dry.      Coloration: Skin is not jaundiced.      Findings: Erythema (left leg) present. No bruising or rash.      Comments: Left hallux partial amputation site sutures noted   Neurological:      General: No focal deficit present.      Mental Status: He is alert and oriented to person, place, and time. Mental status is at baseline.      Motor: No

## 2024-10-29 ENCOUNTER — ANESTHESIA EVENT (OUTPATIENT)
Dept: OPERATING ROOM | Age: 69
DRG: 617 | End: 2024-10-29
Payer: MEDICARE

## 2024-10-29 LAB
ANION GAP SERPL CALCULATED.3IONS-SCNC: 9 MMOL/L (ref 3–16)
BASOPHILS # BLD: 0 K/UL (ref 0–0.2)
BASOPHILS NFR BLD: 0.4 %
BUN SERPL-MCNC: 13 MG/DL (ref 7–20)
CALCIUM SERPL-MCNC: 8.8 MG/DL (ref 8.3–10.6)
CHLORIDE SERPL-SCNC: 106 MMOL/L (ref 99–110)
CO2 SERPL-SCNC: 30 MMOL/L (ref 21–32)
CREAT SERPL-MCNC: 0.6 MG/DL (ref 0.8–1.3)
DEPRECATED RDW RBC AUTO: 15.3 % (ref 12.4–15.4)
EOSINOPHIL # BLD: 0.1 K/UL (ref 0–0.6)
EOSINOPHIL NFR BLD: 1.1 %
GFR SERPLBLD CREATININE-BSD FMLA CKD-EPI: >90 ML/MIN/{1.73_M2}
GLUCOSE BLD-MCNC: 220 MG/DL (ref 70–99)
GLUCOSE BLD-MCNC: 238 MG/DL (ref 70–99)
GLUCOSE BLD-MCNC: 58 MG/DL (ref 70–99)
GLUCOSE BLD-MCNC: 72 MG/DL (ref 70–99)
GLUCOSE BLD-MCNC: 75 MG/DL (ref 70–99)
GLUCOSE BLD-MCNC: 78 MG/DL (ref 70–99)
GLUCOSE SERPL-MCNC: 63 MG/DL (ref 70–99)
HCT VFR BLD AUTO: 37.5 % (ref 40.5–52.5)
HGB BLD-MCNC: 12.5 G/DL (ref 13.5–17.5)
LYMPHOCYTES # BLD: 2.4 K/UL (ref 1–5.1)
LYMPHOCYTES NFR BLD: 39.5 %
MCH RBC QN AUTO: 29.5 PG (ref 26–34)
MCHC RBC AUTO-ENTMCNC: 33.3 G/DL (ref 31–36)
MCV RBC AUTO: 88.4 FL (ref 80–100)
MONOCYTES # BLD: 0.6 K/UL (ref 0–1.3)
MONOCYTES NFR BLD: 10.7 %
NEUTROPHILS # BLD: 2.9 K/UL (ref 1.7–7.7)
NEUTROPHILS NFR BLD: 48.3 %
PERFORMED ON: ABNORMAL
PERFORMED ON: NORMAL
PLATELET # BLD AUTO: 252 K/UL (ref 135–450)
PMV BLD AUTO: 7.9 FL (ref 5–10.5)
POTASSIUM SERPL-SCNC: 3.6 MMOL/L (ref 3.5–5.1)
RBC # BLD AUTO: 4.24 M/UL (ref 4.2–5.9)
SODIUM SERPL-SCNC: 145 MMOL/L (ref 136–145)
WBC # BLD AUTO: 6 K/UL (ref 4–11)

## 2024-10-29 PROCEDURE — 2580000003 HC RX 258

## 2024-10-29 PROCEDURE — 36592 COLLECT BLOOD FROM PICC: CPT

## 2024-10-29 PROCEDURE — 1200000000 HC SEMI PRIVATE

## 2024-10-29 PROCEDURE — 6360000002 HC RX W HCPCS

## 2024-10-29 PROCEDURE — 85025 COMPLETE CBC W/AUTO DIFF WBC: CPT

## 2024-10-29 PROCEDURE — 6370000000 HC RX 637 (ALT 250 FOR IP)

## 2024-10-29 PROCEDURE — 80048 BASIC METABOLIC PNL TOTAL CA: CPT

## 2024-10-29 PROCEDURE — 99233 SBSQ HOSP IP/OBS HIGH 50: CPT | Performed by: INTERNAL MEDICINE

## 2024-10-29 RX ORDER — INSULIN GLARGINE 100 [IU]/ML
45 INJECTION, SOLUTION SUBCUTANEOUS DAILY
Status: DISCONTINUED | OUTPATIENT
Start: 2024-10-30 | End: 2024-10-30

## 2024-10-29 RX ORDER — ACYCLOVIR 400 MG/1
1 TABLET ORAL
COMMUNITY

## 2024-10-29 RX ADMIN — PROPRANOLOL HYDROCHLORIDE 40 MG: 40 TABLET ORAL at 20:32

## 2024-10-29 RX ADMIN — Medication 16 G: at 02:07

## 2024-10-29 RX ADMIN — MEROPENEM 1000 MG: 1 INJECTION INTRAVENOUS at 15:09

## 2024-10-29 RX ADMIN — TRAZODONE HYDROCHLORIDE 200 MG: 100 TABLET ORAL at 20:32

## 2024-10-29 RX ADMIN — DAPTOMYCIN 750 MG: 500 INJECTION, POWDER, LYOPHILIZED, FOR SOLUTION INTRAVENOUS at 09:30

## 2024-10-29 RX ADMIN — MEROPENEM 1000 MG: 1 INJECTION INTRAVENOUS at 08:39

## 2024-10-29 RX ADMIN — CLONAZEPAM 1 MG: 1 TABLET ORAL at 20:32

## 2024-10-29 RX ADMIN — SODIUM CHLORIDE, PRESERVATIVE FREE 10 ML: 5 INJECTION INTRAVENOUS at 10:02

## 2024-10-29 RX ADMIN — Medication 16 G: at 12:11

## 2024-10-29 RX ADMIN — PROPRANOLOL HYDROCHLORIDE 40 MG: 40 TABLET ORAL at 08:39

## 2024-10-29 RX ADMIN — AMLODIPINE BESYLATE 5 MG: 5 TABLET ORAL at 08:39

## 2024-10-29 RX ADMIN — LISINOPRIL 20 MG: 20 TABLET ORAL at 08:39

## 2024-10-29 RX ADMIN — MEROPENEM 1000 MG: 1 INJECTION INTRAVENOUS at 22:57

## 2024-10-29 ASSESSMENT — ENCOUNTER SYMPTOMS: SHORTNESS OF BREATH: 0

## 2024-10-29 ASSESSMENT — PAIN SCALES - GENERAL: PAINLEVEL_OUTOF10: 0

## 2024-10-29 ASSESSMENT — LIFESTYLE VARIABLES: SMOKING_STATUS: 0

## 2024-10-29 NOTE — CARE COORDINATION
Continue to follow patient for discharge needs. Prior to admission, patient was active with [facility] and [facility] for home infusion.     Electronically signed by DEQUAN Chisholm, ANDRESSAW, Case Management on 10/29/2024 at 3:10 PM  [place] [phone]

## 2024-10-29 NOTE — ANESTHESIA PRE PROCEDURE
AM    RDW 15.3 10/29/2024 05:40 AM     10/29/2024 05:40 AM       CMP:   Lab Results   Component Value Date/Time     10/29/2024 05:40 AM    K 3.6 10/29/2024 05:40 AM     10/29/2024 05:40 AM    CO2 30 10/29/2024 05:40 AM    BUN 13 10/29/2024 05:40 AM    CREATININE 0.6 10/29/2024 05:40 AM    GFRAA >60 01/19/2018 09:32 PM    AGRATIO 1.3 10/26/2024 11:49 AM    LABGLOM >90 10/29/2024 05:40 AM    GLUCOSE 63 10/29/2024 05:40 AM    CALCIUM 8.8 10/29/2024 05:40 AM    BILITOT <0.2 10/26/2024 11:49 AM    ALKPHOS 103 10/26/2024 11:49 AM    AST 22 10/26/2024 11:49 AM    ALT 11 10/26/2024 11:49 AM       POC Tests:   Recent Labs     10/29/24  1126   POCGLU 72       Coags:   Lab Results   Component Value Date/Time    PROTIME 12.9 10/28/2024 02:49 PM    INR 0.95 10/28/2024 02:49 PM       HCG (If Applicable): No results found for: \"PREGTESTUR\", \"PREGSERUM\", \"HCG\", \"HCGQUANT\"     ABGs: No results found for: \"PHART\", \"PO2ART\", \"ZQE0XST\", \"GLN5KEK\", \"BEART\", \"P0ASHIRC\"     Type & Screen (If Applicable):  No results found for: \"LABABO\"    Drug/Infectious Status (If Applicable):  No results found for: \"HIV\", \"HEPCAB\"    COVID-19 Screening (If Applicable): No results found for: \"COVID19\"        Anesthesia Evaluation     no history of anesthetic complications (reports awareness during EGD):   Airway: Mallampati: III  TM distance: >3 FB   Neck ROM: full  Comment: Increased neck circumference, denied YEIMY.  Borderline small mouth opening.     Dental:      Comment: Fair dentition, denied loose teeth    Pulmonary:       (-) COPD, asthma, shortness of breath, sleep apnea, rhonchi, wheezes, rales and not a current smoker                           Cardiovascular:  Exercise tolerance: no interval change, Ambulates without difficulty  (+) hypertension:, pacemaker: pacemaker, CAD: non-obstructive, dysrhythmias (BBB; history of complete heart block s/p PPM): SVT, CHF:, hyperlipidemia    (-) weak pulses and peripheral

## 2024-10-29 NOTE — DISCHARGE INSTR - COC
Continuity of Care Form    Patient Name: Tony Boyle   :  1955  MRN:  [id number]    Admit date:  10/26/2024  Discharge date:  10/31    Code Status Order: Full Code   Advance Directives:   Advance Care Flowsheet Documentation        Date/Time Healthcare Directive Type of Healthcare Directive Copy in Chart Healthcare Agent Appointed Healthcare Agent's Name Healthcare Agent's Phone Number    10/29/24 0851 No, patient does not have an advance directive for healthcare treatment  --  --  --  --  --                     Admitting Physician:  Willam Mae MD  PCP: Hebert Bolivar MD    Discharging Nurse: Juan Antonio Spears  Discharging Hospital Unit/Room#: U2G-4352/3109-01  Discharging Unit Phone Number: [phone]    Emergency Contact:   Extended Emergency Contact Information  Primary Emergency Contact: [place] [address] [facility]  Home Phone: [phone]  Mobile Phone: [phone]  Relation: Child  Secondary Emergency Contact: Sylvie Ferreira  Mobile Phone: [phone]  Relation: Child    Past Surgical History:  Past Surgical History:   Procedure Laterality Date    COLONOSCOPY      COLONOSCOPY N/A 3/1/2024    COLONOSCOPY POLYPECTOMY SNARE/COLD BIOPSY performed by Ambrocio Coffey MD at [facility] ENDOSCOPY    ENDOSCOPY, COLON, DIAGNOSTIC      EYE SURGERY Right 2023    PHACOEMULSIFICATION WITH CLAREON PANOPTIX TORIC INTRAOCULAR LENS IMPLANT - RIGHT EYE performed by Luiz Mancuso MD at [facility]    EYE SURGERY Left 05/15/2023    PHACOEMULSIFICATION WITH CLAREON PANOPTIX TORIC INTRAOCULAR LENS IMPLANT - LEFT EYE performed by Luiz Mancuso MD at [facility]    INSERTABLE CARDIAC MONITOR      out    KNEE SURGERY Right     meninscus repair    PACEMAKER INSERTION      had 8 seconds heart stop    TOE AMPUTATION Left 10/16/2024    PARTIAL HALLUX AMPUTATION LEFT FOOT performed by Jimbo Bruno DPM at [facility] OR    TONSILLECTOMY      UPPER GASTROINTESTINAL ENDOSCOPY N/A

## 2024-10-30 ENCOUNTER — ANESTHESIA (OUTPATIENT)
Dept: OPERATING ROOM | Age: 69
DRG: 617 | End: 2024-10-30
Payer: MEDICARE

## 2024-10-30 ENCOUNTER — APPOINTMENT (OUTPATIENT)
Dept: GENERAL RADIOLOGY | Age: 69
DRG: 617 | End: 2024-10-30
Payer: MEDICARE

## 2024-10-30 LAB
ANION GAP SERPL CALCULATED.3IONS-SCNC: 7 MMOL/L (ref 3–16)
BACTERIA SPEC AEROBE CULT: NORMAL
BASOPHILS # BLD: 0.1 K/UL (ref 0–0.2)
BASOPHILS NFR BLD: 1.2 %
BUN SERPL-MCNC: 12 MG/DL (ref 7–20)
CALCIUM SERPL-MCNC: 9.2 MG/DL (ref 8.3–10.6)
CHLORIDE SERPL-SCNC: 105 MMOL/L (ref 99–110)
CO2 SERPL-SCNC: 30 MMOL/L (ref 21–32)
CREAT SERPL-MCNC: 0.6 MG/DL (ref 0.8–1.3)
DEPRECATED RDW RBC AUTO: 15.1 % (ref 12.4–15.4)
EOSINOPHIL # BLD: 0.1 K/UL (ref 0–0.6)
EOSINOPHIL NFR BLD: 1.1 %
GFR SERPLBLD CREATININE-BSD FMLA CKD-EPI: >90 ML/MIN/{1.73_M2}
GLUCOSE BLD-MCNC: 117 MG/DL (ref 70–99)
GLUCOSE BLD-MCNC: 126 MG/DL (ref 70–99)
GLUCOSE BLD-MCNC: 145 MG/DL (ref 70–99)
GLUCOSE BLD-MCNC: 157 MG/DL (ref 70–99)
GLUCOSE BLD-MCNC: 164 MG/DL (ref 70–99)
GLUCOSE BLD-MCNC: 98 MG/DL (ref 70–99)
GLUCOSE SERPL-MCNC: 139 MG/DL (ref 70–99)
GRAM STN SPEC: NORMAL
HCT VFR BLD AUTO: 39.6 % (ref 40.5–52.5)
HGB BLD-MCNC: 13.4 G/DL (ref 13.5–17.5)
LYMPHOCYTES # BLD: 2.2 K/UL (ref 1–5.1)
LYMPHOCYTES NFR BLD: 43.1 %
MCH RBC QN AUTO: 29.8 PG (ref 26–34)
MCHC RBC AUTO-ENTMCNC: 33.8 G/DL (ref 31–36)
MCV RBC AUTO: 88.3 FL (ref 80–100)
MONOCYTES # BLD: 0.6 K/UL (ref 0–1.3)
MONOCYTES NFR BLD: 12.1 %
NEUTROPHILS # BLD: 2.1 K/UL (ref 1.7–7.7)
NEUTROPHILS NFR BLD: 42.5 %
PERFORMED ON: ABNORMAL
PERFORMED ON: NORMAL
PLATELET # BLD AUTO: 252 K/UL (ref 135–450)
PMV BLD AUTO: 7.7 FL (ref 5–10.5)
POTASSIUM SERPL-SCNC: 4.2 MMOL/L (ref 3.5–5.1)
RBC # BLD AUTO: 4.48 M/UL (ref 4.2–5.9)
SODIUM SERPL-SCNC: 142 MMOL/L (ref 136–145)
WBC # BLD AUTO: 5 K/UL (ref 4–11)

## 2024-10-30 PROCEDURE — 6370000000 HC RX 637 (ALT 250 FOR IP)

## 2024-10-30 PROCEDURE — 85025 COMPLETE CBC W/AUTO DIFF WBC: CPT

## 2024-10-30 PROCEDURE — 2709999900 HC NON-CHARGEABLE SUPPLY: Performed by: PODIATRIST

## 2024-10-30 PROCEDURE — 2580000003 HC RX 258: Performed by: ANESTHESIOLOGY

## 2024-10-30 PROCEDURE — 3600000014 HC SURGERY LEVEL 4 ADDTL 15MIN: Performed by: PODIATRIST

## 2024-10-30 PROCEDURE — 6360000002 HC RX W HCPCS

## 2024-10-30 PROCEDURE — 80048 BASIC METABOLIC PNL TOTAL CA: CPT

## 2024-10-30 PROCEDURE — A4217 STERILE WATER/SALINE, 500 ML: HCPCS | Performed by: PODIATRIST

## 2024-10-30 PROCEDURE — 3700000000 HC ANESTHESIA ATTENDED CARE: Performed by: PODIATRIST

## 2024-10-30 PROCEDURE — 3600000004 HC SURGERY LEVEL 4 BASE: Performed by: PODIATRIST

## 2024-10-30 PROCEDURE — 1200000000 HC SEMI PRIVATE

## 2024-10-30 PROCEDURE — 99233 SBSQ HOSP IP/OBS HIGH 50: CPT | Performed by: INTERNAL MEDICINE

## 2024-10-30 PROCEDURE — 88305 TISSUE EXAM BY PATHOLOGIST: CPT

## 2024-10-30 PROCEDURE — 2500000003 HC RX 250 WO HCPCS: Performed by: NURSE ANESTHETIST, CERTIFIED REGISTERED

## 2024-10-30 PROCEDURE — 2580000003 HC RX 258

## 2024-10-30 PROCEDURE — 3700000001 HC ADD 15 MINUTES (ANESTHESIA): Performed by: PODIATRIST

## 2024-10-30 PROCEDURE — 7100000000 HC PACU RECOVERY - FIRST 15 MIN: Performed by: PODIATRIST

## 2024-10-30 PROCEDURE — 7100000001 HC PACU RECOVERY - ADDTL 15 MIN: Performed by: PODIATRIST

## 2024-10-30 PROCEDURE — 6360000002 HC RX W HCPCS: Performed by: NURSE ANESTHETIST, CERTIFIED REGISTERED

## 2024-10-30 PROCEDURE — 0Y6Q0Z0 DETACHMENT AT LEFT 1ST TOE, COMPLETE, OPEN APPROACH: ICD-10-PCS

## 2024-10-30 PROCEDURE — 73630 X-RAY EXAM OF FOOT: CPT

## 2024-10-30 PROCEDURE — 6360000002 HC RX W HCPCS: Performed by: PODIATRIST

## 2024-10-30 PROCEDURE — 2580000003 HC RX 258: Performed by: PODIATRIST

## 2024-10-30 RX ORDER — PROPOFOL 10 MG/ML
INJECTION, EMULSION INTRAVENOUS
Status: DISCONTINUED | OUTPATIENT
Start: 2024-10-30 | End: 2024-10-31 | Stop reason: SDUPTHER

## 2024-10-30 RX ORDER — SODIUM CHLORIDE 0.9 % (FLUSH) 0.9 %
5-40 SYRINGE (ML) INJECTION EVERY 12 HOURS SCHEDULED
Status: DISCONTINUED | OUTPATIENT
Start: 2024-10-30 | End: 2024-10-30 | Stop reason: HOSPADM

## 2024-10-30 RX ORDER — INSULIN LISPRO 100 [IU]/ML
0.08 INJECTION, SOLUTION INTRAVENOUS; SUBCUTANEOUS
Status: DISCONTINUED | OUTPATIENT
Start: 2024-10-30 | End: 2024-10-31 | Stop reason: HOSPADM

## 2024-10-30 RX ORDER — LIDOCAINE HYDROCHLORIDE 20 MG/ML
INJECTION, SOLUTION EPIDURAL; INFILTRATION; INTRACAUDAL; PERINEURAL
Status: DISCONTINUED | OUTPATIENT
Start: 2024-10-30 | End: 2024-10-31 | Stop reason: SDUPTHER

## 2024-10-30 RX ORDER — MAGNESIUM HYDROXIDE 1200 MG/15ML
LIQUID ORAL CONTINUOUS PRN
Status: COMPLETED | OUTPATIENT
Start: 2024-10-30 | End: 2024-10-30

## 2024-10-30 RX ORDER — ONDANSETRON 2 MG/ML
4 INJECTION INTRAMUSCULAR; INTRAVENOUS
Status: DISCONTINUED | OUTPATIENT
Start: 2024-10-30 | End: 2024-10-30 | Stop reason: HOSPADM

## 2024-10-30 RX ORDER — SODIUM CHLORIDE 0.9 % (FLUSH) 0.9 %
5-40 SYRINGE (ML) INJECTION PRN
Status: DISCONTINUED | OUTPATIENT
Start: 2024-10-30 | End: 2024-10-30 | Stop reason: HOSPADM

## 2024-10-30 RX ORDER — INSULIN GLARGINE 100 [IU]/ML
30 INJECTION, SOLUTION SUBCUTANEOUS DAILY
Status: DISCONTINUED | OUTPATIENT
Start: 2024-10-31 | End: 2024-10-31 | Stop reason: HOSPADM

## 2024-10-30 RX ORDER — FENTANYL CITRATE 50 UG/ML
INJECTION, SOLUTION INTRAMUSCULAR; INTRAVENOUS
Status: DISCONTINUED | OUTPATIENT
Start: 2024-10-30 | End: 2024-10-31 | Stop reason: SDUPTHER

## 2024-10-30 RX ORDER — MIDAZOLAM HYDROCHLORIDE 1 MG/ML
INJECTION, SOLUTION INTRAMUSCULAR; INTRAVENOUS
Status: DISCONTINUED | OUTPATIENT
Start: 2024-10-30 | End: 2024-10-31 | Stop reason: SDUPTHER

## 2024-10-30 RX ORDER — FENTANYL CITRATE 0.05 MG/ML
25 INJECTION, SOLUTION INTRAMUSCULAR; INTRAVENOUS EVERY 5 MIN PRN
Status: DISCONTINUED | OUTPATIENT
Start: 2024-10-30 | End: 2024-10-30 | Stop reason: HOSPADM

## 2024-10-30 RX ORDER — NALOXONE HYDROCHLORIDE 0.4 MG/ML
INJECTION, SOLUTION INTRAMUSCULAR; INTRAVENOUS; SUBCUTANEOUS PRN
Status: DISCONTINUED | OUTPATIENT
Start: 2024-10-30 | End: 2024-10-30 | Stop reason: HOSPADM

## 2024-10-30 RX ORDER — SODIUM CHLORIDE 9 MG/ML
INJECTION, SOLUTION INTRAVENOUS PRN
Status: DISCONTINUED | OUTPATIENT
Start: 2024-10-30 | End: 2024-10-30 | Stop reason: HOSPADM

## 2024-10-30 RX ORDER — IPRATROPIUM BROMIDE AND ALBUTEROL SULFATE 2.5; .5 MG/3ML; MG/3ML
1 SOLUTION RESPIRATORY (INHALATION)
Status: DISCONTINUED | OUTPATIENT
Start: 2024-10-30 | End: 2024-10-30 | Stop reason: HOSPADM

## 2024-10-30 RX ORDER — FENTANYL CITRATE 0.05 MG/ML
50 INJECTION, SOLUTION INTRAMUSCULAR; INTRAVENOUS EVERY 5 MIN PRN
Status: DISCONTINUED | OUTPATIENT
Start: 2024-10-30 | End: 2024-10-30 | Stop reason: HOSPADM

## 2024-10-30 RX ADMIN — SODIUM CHLORIDE, PRESERVATIVE FREE 10 ML: 5 INJECTION INTRAVENOUS at 08:28

## 2024-10-30 RX ADMIN — PROPRANOLOL HYDROCHLORIDE 40 MG: 40 TABLET ORAL at 08:25

## 2024-10-30 RX ADMIN — MIDAZOLAM 1 MG: 1 INJECTION INTRAMUSCULAR; INTRAVENOUS at 12:41

## 2024-10-30 RX ADMIN — PROPOFOL 50 MG: 10 INJECTION, EMULSION INTRAVENOUS at 12:44

## 2024-10-30 RX ADMIN — AMLODIPINE BESYLATE 5 MG: 5 TABLET ORAL at 08:25

## 2024-10-30 RX ADMIN — FENTANYL CITRATE 50 MCG: 50 INJECTION INTRAMUSCULAR; INTRAVENOUS at 12:41

## 2024-10-30 RX ADMIN — MEROPENEM 1000 MG: 1 INJECTION INTRAVENOUS at 15:26

## 2024-10-30 RX ADMIN — FENTANYL CITRATE 50 MCG: 50 INJECTION INTRAMUSCULAR; INTRAVENOUS at 12:44

## 2024-10-30 RX ADMIN — INSULIN LISPRO 10 UNITS: 100 INJECTION, SOLUTION INTRAVENOUS; SUBCUTANEOUS at 17:49

## 2024-10-30 RX ADMIN — PROPOFOL 150 MCG/KG/MIN: 10 INJECTION, EMULSION INTRAVENOUS at 12:46

## 2024-10-30 RX ADMIN — MEROPENEM 1000 MG: 1 INJECTION INTRAVENOUS at 06:26

## 2024-10-30 RX ADMIN — MIDAZOLAM 1 MG: 1 INJECTION INTRAMUSCULAR; INTRAVENOUS at 12:38

## 2024-10-30 RX ADMIN — LISINOPRIL 20 MG: 20 TABLET ORAL at 08:25

## 2024-10-30 RX ADMIN — MEROPENEM 1000 MG: 1 INJECTION INTRAVENOUS at 22:45

## 2024-10-30 RX ADMIN — PROPRANOLOL HYDROCHLORIDE 40 MG: 40 TABLET ORAL at 21:29

## 2024-10-30 RX ADMIN — CLONAZEPAM 1 MG: 1 TABLET ORAL at 22:43

## 2024-10-30 RX ADMIN — LIDOCAINE HYDROCHLORIDE 100 MG: 20 INJECTION, SOLUTION EPIDURAL; INFILTRATION; INTRACAUDAL; PERINEURAL at 12:41

## 2024-10-30 RX ADMIN — TRAZODONE HYDROCHLORIDE 200 MG: 100 TABLET ORAL at 22:43

## 2024-10-30 RX ADMIN — ACETAMINOPHEN 325MG 650 MG: 325 TABLET ORAL at 22:42

## 2024-10-30 RX ADMIN — DAPTOMYCIN 750 MG: 500 INJECTION, POWDER, LYOPHILIZED, FOR SOLUTION INTRAVENOUS at 09:47

## 2024-10-30 ASSESSMENT — PAIN - FUNCTIONAL ASSESSMENT: PAIN_FUNCTIONAL_ASSESSMENT: 0-10

## 2024-10-30 ASSESSMENT — PAIN DESCRIPTION - ORIENTATION: ORIENTATION: LEFT

## 2024-10-30 ASSESSMENT — PAIN DESCRIPTION - LOCATION: LOCATION: TOE (COMMENT WHICH ONE)

## 2024-10-30 ASSESSMENT — PAIN SCALES - GENERAL
PAINLEVEL_OUTOF10: 0
PAINLEVEL_OUTOF10: 5
PAINLEVEL_OUTOF10: 0

## 2024-10-30 ASSESSMENT — PAIN DESCRIPTION - DESCRIPTORS: DESCRIPTORS: SHARP

## 2024-10-30 NOTE — OP NOTE
Operative Note      Patient: Tony Boyle  YOB: 1955  MRN: [id number]     Date of Procedure: 10/30/2024     Pre-Op Diagnosis Codes:      * Acute osteomyelitis-ankle/foot, left [M86.172]     Post-Op Diagnosis: Same       Procedure(s):  LEFT GREAT TOE AMPUTATION AT METATARSAL JOINT     Surgeon(s):  Jimbo Bruno DPM     Assistant:  * No surgical staff found *     Anesthesia: Monitor Anesthesia Care     Hemostasis: anatomic dissection and electrocautery     Injectables: Pre-Op 10 cc of 1% polocaine plain      Materials:  3-0 Nylon     Implants: None      Estimated Blood Loss (mL): Minimal     Complications: None     Specimens:   ID Type Source Tests Collected by Time Destination   A : LEFT GREAT TOE Tissue Tissue SURGICAL PATHOLOGY Jimbo Bruno DPM 10/30/2024 1310           Implants:  * No implants in log *      Drains: * No LDAs found *     Findings:  Infection Present At Time Of Surgery (PATOS) (choose all levels that have infection present):  No infection present  Other Findings: 1st Metatarsal articular cartilage bight, white and shiny consistent with healthy bone. No purulent drainage encountered. Incision primarily closed with adequate intraoperative bleeding to facilitate healing.       Detailed Description of Procedure:     INDICATIONS FOR PROCEDURE: This patient has signs and symptoms clinically and radiographically consistent with the above mentioned preoperative diagnosis. Having failed conservative treatment, it was determined that the patient would benefit from surgical intervention. All potential risks, benefits, and complications were discussed with the patient prior to the scheduling of surgery. All the patient's questions were answered and no guarantees were given. The patient wished to proceed with surgery, and informed written consent was obtained.     DETAILS OF PROCEDURE: The patient was brought from the pre-operative area and placed on the operating table in the supine

## 2024-10-30 NOTE — BRIEF OP NOTE
Brief Postoperative Note      Patient: Tony Boyle  YOB: 1955  MRN: [id number]    Date of Procedure: 10/30/2024    Pre-Op Diagnosis Codes:      * Acute osteomyelitis-ankle/foot, left [M86.172]    Post-Op Diagnosis: Same       Procedure(s):  LEFT GREAT TOE AMPUTATION AT METATARSAL JOINT    Surgeon(s):  Jimbo Bruno DPM    Assistant:  * No surgical staff found *    Anesthesia: Monitor Anesthesia Care    Hemostasis: anatomic dissection and electrocautery    Injectables: Pre-Op 10 cc of 1% polocaine plain     Materials:  3-0 Nylon    Implants: None     Estimated Blood Loss (mL): Minimal    Complications: None    Specimens:   ID Type Source Tests Collected by Time Destination   A : LEFT GREAT TOE Tissue Tissue SURGICAL PATHOLOGY Jmibo Bruno DPM 10/30/2024 1310        Implants:  * No implants in log *      Drains: * No LDAs found *    Findings:  Infection Present At Time Of Surgery (PATOS) (choose all levels that have infection present):  No infection present  Other Findings: 1st Metatarsal articular cartilage bight, white and shiny consistent with healthy bone. No purulent drainage encountered. Incision primarily closed with adequate intraoperative bleeding to facilitate healing.     Dispo: S/P Left great toe amputation at the level of the MPJ, NWB Left LE in surgical shoe while in house, specimen sent for permanent path, XR ordered in PACU. Patient is stable for Dc from podiatric standpoint (may become full Weight bearing in surgical shoe to Left lower extremity). Procedure felt definitive in terms of resection of residual infection.     Dictated on behalf of Dr. Jimbo Canseco DPM, MS    Podiatric Resident, PGY-1   [employer]   Pager Number: [phone]  10/30/2024

## 2024-10-31 ENCOUNTER — TELEPHONE (OUTPATIENT)
Dept: INFECTIOUS DISEASES | Age: 69
End: 2024-10-31

## 2024-10-31 VITALS
DIASTOLIC BLOOD PRESSURE: 81 MMHG | TEMPERATURE: 98.2 F | OXYGEN SATURATION: 95 % | HEIGHT: 73 IN | BODY MASS INDEX: 35.68 KG/M2 | SYSTOLIC BLOOD PRESSURE: 149 MMHG | WEIGHT: 269.18 LBS | RESPIRATION RATE: 16 BRPM | HEART RATE: 64 BPM

## 2024-10-31 LAB
ANION GAP SERPL CALCULATED.3IONS-SCNC: 8 MMOL/L (ref 3–16)
BUN SERPL-MCNC: 11 MG/DL (ref 7–20)
CALCIUM SERPL-MCNC: 8.9 MG/DL (ref 8.3–10.6)
CHLORIDE SERPL-SCNC: 105 MMOL/L (ref 99–110)
CO2 SERPL-SCNC: 28 MMOL/L (ref 21–32)
CREAT SERPL-MCNC: 0.6 MG/DL (ref 0.8–1.3)
DEPRECATED RDW RBC AUTO: 15.4 % (ref 12.4–15.4)
GFR SERPLBLD CREATININE-BSD FMLA CKD-EPI: >90 ML/MIN/{1.73_M2}
GLUCOSE BLD-MCNC: 129 MG/DL (ref 70–99)
GLUCOSE BLD-MCNC: 169 MG/DL (ref 70–99)
GLUCOSE SERPL-MCNC: 120 MG/DL (ref 70–99)
HCT VFR BLD AUTO: 37.5 % (ref 40.5–52.5)
HGB BLD-MCNC: 12.5 G/DL (ref 13.5–17.5)
MCH RBC QN AUTO: 29.6 PG (ref 26–34)
MCHC RBC AUTO-ENTMCNC: 33.3 G/DL (ref 31–36)
MCV RBC AUTO: 89.1 FL (ref 80–100)
PERFORMED ON: ABNORMAL
PERFORMED ON: ABNORMAL
PLATELET # BLD AUTO: 232 K/UL (ref 135–450)
PMV BLD AUTO: 7.9 FL (ref 5–10.5)
POTASSIUM SERPL-SCNC: 4.1 MMOL/L (ref 3.5–5.1)
RBC # BLD AUTO: 4.2 M/UL (ref 4.2–5.9)
SODIUM SERPL-SCNC: 141 MMOL/L (ref 136–145)
WBC # BLD AUTO: 7.1 K/UL (ref 4–11)

## 2024-10-31 PROCEDURE — 6370000000 HC RX 637 (ALT 250 FOR IP)

## 2024-10-31 PROCEDURE — 94760 N-INVAS EAR/PLS OXIMETRY 1: CPT

## 2024-10-31 PROCEDURE — 80048 BASIC METABOLIC PNL TOTAL CA: CPT

## 2024-10-31 PROCEDURE — 85027 COMPLETE CBC AUTOMATED: CPT

## 2024-10-31 PROCEDURE — 6360000002 HC RX W HCPCS

## 2024-10-31 PROCEDURE — 2580000003 HC RX 258

## 2024-10-31 RX ADMIN — Medication 2000 UNITS: at 09:43

## 2024-10-31 RX ADMIN — LISINOPRIL 20 MG: 20 TABLET ORAL at 09:43

## 2024-10-31 RX ADMIN — AMLODIPINE BESYLATE 5 MG: 5 TABLET ORAL at 09:43

## 2024-10-31 RX ADMIN — INSULIN LISPRO 10 UNITS: 100 INJECTION, SOLUTION INTRAVENOUS; SUBCUTANEOUS at 09:42

## 2024-10-31 RX ADMIN — INSULIN GLARGINE 30 UNITS: 100 INJECTION, SOLUTION SUBCUTANEOUS at 09:42

## 2024-10-31 RX ADMIN — DAPTOMYCIN 750 MG: 500 INJECTION, POWDER, LYOPHILIZED, FOR SOLUTION INTRAVENOUS at 09:55

## 2024-10-31 RX ADMIN — PROPRANOLOL HYDROCHLORIDE 40 MG: 40 TABLET ORAL at 09:43

## 2024-10-31 RX ADMIN — TADALAFIL 5 MG: 5 TABLET ORAL at 11:05

## 2024-10-31 RX ADMIN — VENLAFAXINE HYDROCHLORIDE 75 MG: 37.5 CAPSULE, EXTENDED RELEASE ORAL at 09:43

## 2024-10-31 RX ADMIN — PANTOPRAZOLE SODIUM 40 MG: 40 TABLET, DELAYED RELEASE ORAL at 09:43

## 2024-10-31 RX ADMIN — MEROPENEM 1000 MG: 1 INJECTION INTRAVENOUS at 06:28

## 2024-10-31 ASSESSMENT — PAIN SCALES - WONG BAKER: WONGBAKER_NUMERICALRESPONSE: NO HURT

## 2024-10-31 ASSESSMENT — PAIN SCALES - GENERAL: PAINLEVEL_OUTOF10: 0

## 2024-10-31 NOTE — PLAN OF CARE
Problem: Chronic Conditions and Co-morbidities  Goal: Patient's chronic conditions and co-morbidity symptoms are monitored and maintained or improved  10/27/2024 0207 by Marj Mojica RN  Outcome: Progressing  10/26/2024 1710 by Laura Guy RN  Outcome: Progressing  Flowsheets (Taken 10/26/2024 1710)  Care Plan - Patient's Chronic Conditions and Co-Morbidity Symptoms are Monitored and Maintained or Improved: Monitor and assess patient's chronic conditions and comorbid symptoms for stability, deterioration, or improvement     Problem: Discharge Planning  Goal: Discharge to home or other facility with appropriate resources  10/27/2024 0207 by Marj Mojica, RN  Outcome: Progressing  10/26/2024 1710 by Laura Guy RN  Outcome: Progressing  Flowsheets (Taken 10/26/2024 1710)  Discharge to home or other facility with appropriate resources:   Identify barriers to discharge with patient and caregiver   Identify discharge learning needs (meds, wound care, etc)   Refer to discharge planning if patient needs post-hospital services based on physician order or complex needs related to functional status, cognitive ability or social support system   Arrange for needed discharge resources and transportation as appropriate     Problem: Pain  Goal: Verbalizes/displays adequate comfort level or baseline comfort level  10/27/2024 0207 by Marj Mojica, RN  Outcome: Progressing  10/26/2024 1710 by Laura Guy RN  Outcome: Progressing  Flowsheets (Taken 10/26/2024 1710)  Verbalizes/displays adequate comfort level or baseline comfort level:   Encourage patient to monitor pain and request assistance   Administer analgesics based on type and severity of pain and evaluate response   Assess pain using appropriate pain scale   Implement non-pharmacological measures as appropriate and evaluate response   Notify Licensed Independent Practitioner if interventions unsuccessful or patient reports new pain     
  Problem: Chronic Conditions and Co-morbidities  Goal: Patient's chronic conditions and co-morbidity symptoms are monitored and maintained or improved  Outcome: Progressing     Problem: Discharge Planning  Goal: Discharge to home or other facility with appropriate resources  Outcome: Progressing     
  Problem: Safety - Adult  Goal: Free from fall injury  10/29/2024 0722 by Adela Zuleta, RN  Outcome: Progressing   Will remain free from falls. Fall precautions are in place. Call light, telephone and bedside table are within reach.   Problem: Chronic Conditions and Co-morbidities  Goal: Patient's chronic conditions and co-morbidity symptoms are monitored and maintained or improved  10/29/2024 0722 by Adela Zuleta, RN  Outcome: Progressing     Problem: Discharge Planning  Goal: Discharge to home or other facility with appropriate resources  10/29/2024 0722 by Adela Zuleta, RN  Outcome: Progressing   Assessed patients knowledge of discharge.  Will continue to work with patient on discharge planning and answer patient questions. Will consult case management and  as necessary.   Problem: Pain  Goal: Verbalizes/displays adequate comfort level or baseline comfort level  10/29/2024 0722 by Adela Zuleta, RN  Outcome: Progressing   Patient educated on acute pain.  Taught patient to use call light to ask for pain medication.  PRN pain medication given for acute pain.  Will continue to monitor pain per unit protocol.    Problem: ABCDS Injury Assessment  Goal: Absence of physical injury  10/29/2024 0722 by Adela Zuleta, RN  Outcome: Progressing     
  Problem: Safety - Adult  Goal: Free from fall injury  10/30/2024 0720 by Adela Zuleta, RN  Outcome: Progressing   Will remain free from falls. Fall precautions are in place. Call light, telephone and bedside table are within reach.   Problem: Chronic Conditions and Co-morbidities  Goal: Patient's chronic conditions and co-morbidity symptoms are monitored and maintained or improved  10/30/2024 0720 by Adela Zuleta, RN  Outcome: Progressing     Problem: Discharge Planning  Goal: Discharge to home or other facility with appropriate resources  10/30/2024 0720 by Adela Zuleta, RN  Outcome: Progressing   Assessed patients knowledge of discharge.  Will continue to work with patient on discharge planning and answer patient questions. Will consult case management and  as necessary.   Problem: Pain  Goal: Verbalizes/displays adequate comfort level or baseline comfort level  10/30/2024 0720 by Adela Zuleta, RN  Outcome: Progressing   Patient educated on acute pain.  Taught patient to use call light to ask for pain medication.  PRN pain medication given for acute pain.  Will continue to monitor pain per unit protocol.    Problem: ABCDS Injury Assessment  Goal: Absence of physical injury  10/30/2024 0720 by Adela Zuleta, RN  Outcome: Progressing     
  Problem: Safety - Adult  Goal: Free from fall injury  10/31/2024 1057 by Juan Antonio Spears RN  Outcome: Progressing  Flowsheets (Taken 10/31/2024 1053)  Free From Fall Injury: Instruct family/caregiver on patient safety  10/31/2024 0122 by Ana Cardenas RN  Outcome: Progressing     Problem: Chronic Conditions and Co-morbidities  Goal: Patient's chronic conditions and co-morbidity symptoms are monitored and maintained or improved  10/31/2024 1057 by Juan Antonio Spears RN  Outcome: Progressing  10/31/2024 0122 by Ana Cardenas RN  Outcome: Progressing  Flowsheets (Taken 10/30/2024 2131)  Care Plan - Patient's Chronic Conditions and Co-Morbidity Symptoms are Monitored and Maintained or Improved: Monitor and assess patient's chronic conditions and comorbid symptoms for stability, deterioration, or improvement     Problem: Discharge Planning  Goal: Discharge to home or other facility with appropriate resources  10/31/2024 1057 by Juan Antonio Spears RN  Outcome: Progressing  10/31/2024 0122 by Ana Cardenas RN  Outcome: Progressing  Flowsheets (Taken 10/30/2024 2131)  Discharge to home or other facility with appropriate resources:   Identify barriers to discharge with patient and caregiver   Arrange for needed discharge resources and transportation as appropriate   Identify discharge learning needs (meds, wound care, etc)     Problem: Pain  Goal: Verbalizes/displays adequate comfort level or baseline comfort level  10/31/2024 1057 by Juan Antonio Spears RN  Outcome: Progressing  10/31/2024 0122 by Ana Cardenas RN  Outcome: Progressing     Problem: ABCDS Injury Assessment  Goal: Absence of physical injury  10/31/2024 1057 by Juan Antonio Spears RN  Outcome: Progressing  Flowsheets (Taken 10/31/2024 1053)  Absence of Physical Injury: Implement safety measures based on patient assessment  10/31/2024 0122 by Ana Cardenas RN  Outcome: Progressing     Problem: Neurosensory - Adult  Goal: Achieves stable or improved 
  Problem: Safety - Adult  Goal: Free from fall injury  Outcome: Progressing  Flowsheets (Taken 10/26/2024 1710)  Free From Fall Injury: Instruct family/caregiver on patient safety     Problem: Chronic Conditions and Co-morbidities  Goal: Patient's chronic conditions and co-morbidity symptoms are monitored and maintained or improved  Outcome: Progressing  Flowsheets (Taken 10/26/2024 1710)  Care Plan - Patient's Chronic Conditions and Co-Morbidity Symptoms are Monitored and Maintained or Improved: Monitor and assess patient's chronic conditions and comorbid symptoms for stability, deterioration, or improvement     Problem: Discharge Planning  Goal: Discharge to home or other facility with appropriate resources  Outcome: Progressing  Flowsheets (Taken 10/26/2024 1710)  Discharge to home or other facility with appropriate resources:   Identify barriers to discharge with patient and caregiver   Identify discharge learning needs (meds, wound care, etc)   Refer to discharge planning if patient needs post-hospital services based on physician order or complex needs related to functional status, cognitive ability or social support system   Arrange for needed discharge resources and transportation as appropriate     Problem: Pain  Goal: Verbalizes/displays adequate comfort level or baseline comfort level  Outcome: Progressing  Flowsheets (Taken 10/26/2024 1710)  Verbalizes/displays adequate comfort level or baseline comfort level:   Encourage patient to monitor pain and request assistance   Administer analgesics based on type and severity of pain and evaluate response   Assess pain using appropriate pain scale   Implement non-pharmacological measures as appropriate and evaluate response   Notify Licensed Independent Practitioner if interventions unsuccessful or patient reports new pain     Problem: ABCDS Injury Assessment  Goal: Absence of physical injury  Outcome: Progressing  Flowsheets (Taken 10/26/2024 1710)  Absence of 
  Problem: Safety - Adult  Goal: Free from fall injury  Outcome: Progressing  Flowsheets (Taken 10/27/2024 0206 by Marj Mojica, RN)  Free From Fall Injury: Instruct family/caregiver on patient safety     Problem: Chronic Conditions and Co-morbidities  Goal: Patient's chronic conditions and co-morbidity symptoms are monitored and maintained or improved  10/27/2024 0755 by Laura Guy RN  Outcome: Progressing  Flowsheets (Taken 10/26/2024 1710)  Care Plan - Patient's Chronic Conditions and Co-Morbidity Symptoms are Monitored and Maintained or Improved: Monitor and assess patient's chronic conditions and comorbid symptoms for stability, deterioration, or improvement     Problem: Discharge Planning  Goal: Discharge to home or other facility with appropriate resources  10/27/2024 0755 by Laura Guy RN  Outcome: Progressing  Flowsheets (Taken 10/26/2024 1710)  Discharge to home or other facility with appropriate resources:   Identify barriers to discharge with patient and caregiver   Identify discharge learning needs (meds, wound care, etc)   Refer to discharge planning if patient needs post-hospital services based on physician order or complex needs related to functional status, cognitive ability or social support system   Arrange for needed discharge resources and transportation as appropriate     Problem: Pain  Goal: Verbalizes/displays adequate comfort level or baseline comfort level  10/27/2024 0755 by Laura Guy RN  Outcome: Progressing  Flowsheets (Taken 10/27/2024 0755)  Verbalizes/displays adequate comfort level or baseline comfort level:   Encourage patient to monitor pain and request assistance   Administer analgesics based on type and severity of pain and evaluate response   Assess pain using appropriate pain scale   Implement non-pharmacological measures as appropriate and evaluate response   Notify Licensed Independent Practitioner if interventions unsuccessful or patient reports new pain   
  Problem: Safety - Adult  Goal: Free from fall injury  Outcome: Progressing  Flowsheets (Taken 10/28/2024 0156 by Marj Mojica, RN)  Free From Fall Injury: Instruct family/caregiver on patient safety   Will remain free from falls. Fall precautions are in place. Call light, telephone and bedside table are within reach.   Problem: Chronic Conditions and Co-morbidities  Goal: Patient's chronic conditions and co-morbidity symptoms are monitored and maintained or improved  10/28/2024 1138 by Adela Zuleta RN  Outcome: Progressing     Problem: Discharge Planning  Goal: Discharge to home or other facility with appropriate resources  10/28/2024 1138 by Adela Zuleta RN  Outcome: Progressing   Assessed patients knowledge of discharge.  Will continue to work with patient on discharge planning and answer patient questions. Will consult case management and  as necessary.   Problem: Pain  Goal: Verbalizes/displays adequate comfort level or baseline comfort level  Outcome: Progressing   Patient educated on acute pain.  Taught patient to use call light to ask for pain medication.  PRN pain medication given for acute pain.  Will continue to monitor pain per unit protocol.    Problem: ABCDS Injury Assessment  Goal: Absence of physical injury  Outcome: Progressing  Flowsheets (Taken 10/28/2024 0156 by Marj Mojica, RN)  Absence of Physical Injury: Implement safety measures based on patient assessment     
  Problem: Safety - Adult  Goal: Free from fall injury  Outcome: Progressing  Flowsheets (Taken 10/30/2024 0055)  Free From Fall Injury: Instruct family/caregiver on patient safety     Problem: Chronic Conditions and Co-morbidities  Goal: Patient's chronic conditions and co-morbidity symptoms are monitored and maintained or improved  Outcome: Progressing  Flowsheets (Taken 10/30/2024 0055)  Care Plan - Patient's Chronic Conditions and Co-Morbidity Symptoms are Monitored and Maintained or Improved:   Collaborate with multidisciplinary team to address chronic and comorbid conditions and prevent exacerbation or deterioration   Monitor and assess patient's chronic conditions and comorbid symptoms for stability, deterioration, or improvement     Problem: Discharge Planning  Goal: Discharge to home or other facility with appropriate resources  Outcome: Progressing  Flowsheets (Taken 10/30/2024 0055)  Discharge to home or other facility with appropriate resources:   Identify barriers to discharge with patient and caregiver   Arrange for needed discharge resources and transportation as appropriate     Problem: Pain  Goal: Verbalizes/displays adequate comfort level or baseline comfort level  Outcome: Progressing  Flowsheets (Taken 10/30/2024 0055)  Verbalizes/displays adequate comfort level or baseline comfort level:   Assess pain using appropriate pain scale   Encourage patient to monitor pain and request assistance   Administer analgesics based on type and severity of pain and evaluate response   Implement non-pharmacological measures as appropriate and evaluate response     Problem: ABCDS Injury Assessment  Goal: Absence of physical injury  Outcome: Progressing  Flowsheets (Taken 10/30/2024 0055)  Absence of Physical Injury: Implement safety measures based on patient assessment     
10/28/2024 2144)  Verbalizes/displays adequate comfort level or baseline comfort level:   Encourage patient to monitor pain and request assistance   Administer analgesics based on type and severity of pain and evaluate response   Assess pain using appropriate pain scale  10/28/2024 1138 by Adela Zuleta, RN  Outcome: Progressing     Problem: ABCDS Injury Assessment  Goal: Absence of physical injury  10/28/2024 2144 by Maribel Vee, RN  Outcome: Progressing  Flowsheets (Taken 10/28/2024 2144)  Absence of Physical Injury: Implement safety measures based on patient assessment  10/28/2024 1138 by Adela Zuleta, RN  Outcome: Progressing  Flowsheets (Taken 10/28/2024 0156 by Marj Mojica, RN)  Absence of Physical Injury: Implement safety measures based on patient assessment     
Electrolytes maintained within normal limits  Outcome: Progressing  Flowsheets (Taken 10/30/2024 2131)  Electrolytes maintained within normal limits: Monitor labs and assess patient for signs and symptoms of electrolyte imbalances  Goal: Hemodynamic stability and optimal renal function maintained  Outcome: Progressing  Flowsheets (Taken 10/30/2024 2131)  Hemodynamic stability and optimal renal function maintained: Monitor labs and assess for signs and symptoms of volume excess or deficit  Goal: Glucose maintained within prescribed range  Outcome: Progressing  Flowsheets (Taken 10/30/2024 2131)  Glucose maintained within prescribed range: Monitor blood glucose as ordered

## 2024-10-31 NOTE — TELEPHONE ENCOUNTER
Verbal OPAT orders as follows: IV Meropenem x 1 gm Q 8 hrs x stop date x 11/20   IV Daptomycin x  750 mg Q 24 hr x stop date x 11/20   CBC with diff, CMP, ESR, CRP weekly  given to Heather pharmacist at [facility].

## 2024-10-31 NOTE — CARE COORDINATION
DISCHARGE SUMMARY     DATE OF DISCHARGE: 10/31/24    DISCHARGE DESTINATION: Home      [facility]             PHONE NUMBER: [phone]             FAX NUMBER: [phone]    DME ORDERED: no      COMMENTS: Plan is for start of care tomorrow 11/1

## 2024-10-31 NOTE — PROGRESS NOTES
Name:  Tony Boyle /Age/Sex: 1955  (68 y.o. male)   MRN & CSN:  [id number] & [id number] Encounter Date/Time: 10/27/2024 10:28 AM EDT   Location:  [id number] PCP: Hebert Bolivar MD     Attending:Willam Mae MD       Tony Boyle is a 68 y.o. male with  has a past medical history of Depression, Diabetes mellitus ([facility]), GERD (gastroesophageal reflux disease), Hyperlipidemia, Hypertension, LBBB (left bundle branch block), and SVT (supraventricular tachycardia) (Formerly [facility]). who presents with Osteomyelitis of fifth toe of left foot    Hospital Day: 2      Interval history:     Seen and examined at bedside. No acute events overnight. Reports some pain in foot on and off but is well controlled.     Review of Systems:      10 point ROS negative except stated above    Objective:     Intake/Output Summary (Last 24 hours) at 10/27/2024 1028  Last data filed at 10/27/2024 0819  Gross per 24 hour   Intake 600 ml   Output 275 ml   Net 325 ml      Vitals:   Vitals:    10/26/24 2348 10/27/24 0543 10/27/24 0723 10/27/24 0910   BP: (!) 145/75  138/73    Pulse: 66  70    Resp: 19  18    Temp: 97.3 °F (36.3 °C)  97.7 °F (36.5 °C)    TempSrc: Oral  Oral    SpO2: 92%  94% 94%   Weight:  121.4 kg (267 lb 10.2 oz)     Height:             Physical Exam:        General: NAD  Eyes: EOMI  ENT: neck supple  Cardiovascular: Regular rate.  Respiratory: Clear to auscultation  Gastrointestinal: Soft, non tender  Genitourinary: no suprapubic tenderness  Musculoskeletal: No edema  Skin: Left foot fifth toe partial amputation with area of erythema surrounding medial foot extending into the ankle, spreading beyond the marked area  Neuro: Alert.  Psych: Mood appropriate.       Assessment and Plan     1. Left foot cellulitis : Recently discharged on 10/19 with IV meropenem till  for osteomyelitis of left fifth toe. Followed with podiatry 10/22 and were concerned about worsening cellulitis. Infectious disease added IV 
  Physician Progress Note      PATIENT:               CLIVE HARDING  CSN #:                  [id number]  :                       1955  ADMIT DATE:       10/26/2024 11:32 AM  DISCH DATE:  RESPONDING  PROVIDER #:        Willam Mae MD          QUERY TEXT:    Pt admitted with left foot cellulitis. Pt noted to have DM 2. If possible,   please document in progress notes and discharge summary the relationship, if   any, between cellulitis and DM.    The medical record reflects the following:  Risk Factors: Age 67yo. Hx- DM2, Osteo left 5th toe  Clinical Indicators: Gluc 202, sed rate 42, CRP 14.4, 10/12/24- HGA1C- 9.9  Treatment: POCT Glucose ac and hs and prn  Options provided:  -- Left foot cellulitis associated with Diabetes  -- Left foot cellulitis unrelated to Diabetes  -- Other - I will add my own diagnosis  -- Disagree - Not applicable / Not valid  -- Disagree - Clinically unable to determine / Unknown  -- Refer to Clinical Documentation Reviewer    PROVIDER RESPONSE TEXT:    Left foot cellulitis associated with Diabetes.    Query created by: gNoc Jones on 10/28/2024 9:01 AM      Electronically signed by:  Willam Mae MD 10/29/2024 3:18 PM          
4 Eyes Skin Assessment     NAME:  Tony Boyle  YOB: 1955  MEDICAL RECORD NUMBER:  [id number]    The patient is being assessed for  Admission    I agree that at least one RN has performed a thorough Head to Toe Skin Assessment on the patient. ALL assessment sites listed below have been assessed.      Areas assessed by both nurses:    Head, Face, Ears, Shoulders, Back, Chest, Arms, Elbows, Hands, Sacrum. Buttock, Coccyx, Ischium, Legs. Feet and Heels, and Under Medical Devices         Does the Patient have a Wound? Yes wound(s) were present on assessment. LDA wound assessment was Initiated and completed by RN  Incision/ wound L great toe, callus on R foot, blanchable redness on sacrum        Juaquin Prevention initiated by RN: Yes  Wound Care Orders initiated by RN: No    Pressure Injury (Stage 3,4, Unstageable, DTI, NWPT, and Complex wounds) if present, place Wound referral order by RN under : No    New Ostomies, if present place, Ostomy referral order under : No     Nurse 1 eSignature: Electronically signed by Laura Guy RN on 10/26/24 at 6:20 PM EDT    **SHARE this note so that the co-signing nurse can place an eSignature**    Nurse 2 eSignature: Electronically signed by Barbra Perez RN on 10/26/24 at 7:25 PM EDT    
Adela JAVED called and made aware of procedure at 1135 am LEFT GREAT TOE AMPUTATION AT METATARSAL JOINT - Left with Dr Bruno. Will monitor.   
Awaiting surgical time for further amputation of LEFT Great Toe  
Bedside introduction complete. Patient denies any needs at this time. Updated whiteboard with RN and PCA name and number. Patient able to make needs known, using call light appropriately. Will continue to monitor and assess for needs and comfort.     
Department of Podiatry Progress Note        Reason for Consult:  left foot erythema   Requesting Physician:  Willam Mae    CHIEF COMPLAINT:  Left foot redness     HISTORY OF PRESENT ILLNESS:    The patient is a 68 y.o. male with significant past medical history as listed below. Podiatry was consulted for Left DFI.  Pt is known to the service and is s/p partial hallux amp left foot (DOS 10/16/2024) who presented to the ED on 10/26 with worsening erythema of his left foot. Pt states that it had slowly increased over a 48-72 hour stretch before he finally came into the hospital. He was recently dc'd on 10/19 with IV meropenem at ID's recommendation for OM left 5th toe. Patient denies fever, chills, nausea, vomiting, shortness of breath, chest pain.  Patient has no other pedal complaints at this time.    Feels well, anticipates discharge to home after surgery    Past Medical History:        Diagnosis Date    Depression     Diabetes mellitus (HCC)     GERD (gastroesophageal reflux disease)     Hyperlipidemia     Hypertension     LBBB (left bundle branch block)     SVT (supraventricular tachycardia) (HCC)        Past Surgical History:        Procedure Laterality Date    COLONOSCOPY      COLONOSCOPY N/A 3/1/2024    COLONOSCOPY POLYPECTOMY SNARE/COLD BIOPSY performed by Ambrocio Coffey MD at [facility] ENDOSCOPY    ENDOSCOPY, COLON, DIAGNOSTIC      EYE SURGERY Right 05/01/2023    PHACOEMULSIFICATION WITH CLAREON PANOPTIX TORIC INTRAOCULAR LENS IMPLANT - RIGHT EYE performed by Luiz Mancuso MD at [facility]    EYE SURGERY Left 05/15/2023    PHACOEMULSIFICATION WITH CLAREON PANOPTIX TORIC INTRAOCULAR LENS IMPLANT - LEFT EYE performed by Luiz Mancuso MD at [facility]    INSERTABLE CARDIAC MONITOR      out    KNEE SURGERY Right     meninscus repair    PACEMAKER INSERTION      had 8 seconds heart stop    TOE AMPUTATION Left 10/16/2024    PARTIAL HALLUX AMPUTATION LEFT FOOT performed by Jimbo Bruno DPM at [facility] OR    
[facility]  Glycemic Control        NAME: Tony Boyle  MEDICAL RECORD NUMBER:  [id number]  AGE: 68 y.o.   GENDER: male  : 1955  EPISODE DATE:  10/31/2024     Data     Recent Labs     10/30/24  1025 10/30/24  1331 10/30/24  1544 10/30/24  2132 10/31/24  0700 10/31/24  1109   POCGLU 117* 98 145* 126* 129* 169*       HgBA1c:    Lab Results   Component Value Date/Time    LABA1C 9.9 10/12/2024 05:06 AM       BUN/Creatinine:    Lab Results   Component Value Date/Time    BUN 11 10/31/2024 04:40 AM    CREATININE 0.6 10/31/2024 04:40 AM       Medications  Scheduled Medications:   insulin glargine  30 Units SubCUTAneous Daily    insulin lispro  0.08 Units/kg SubCUTAneous TID WC    sodium chloride flush  5-40 mL IntraVENous 2 times per day    [Held by provider] enoxaparin  30 mg SubCUTAneous BID    meropenem  1,000 mg IntraVENous Q8H    DAPTOmycin (CUBICIN) 750 mg in sodium chloride 0.9 % 50 mL IVPB  6 mg/kg IntraVENous Q24H    amLODIPine  5 mg Oral Daily    clonazePAM  1 mg Oral Nightly    venlafaxine  75 mg Oral Daily with breakfast    lisinopril  20 mg Oral Daily    pantoprazole  40 mg Oral Daily    propranolol  40 mg Oral BID    [Held by provider] rosuvastatin  10 mg Oral Nightly    tadalafil  5 mg Oral Daily    traZODone  200 mg Oral Nightly    Vitamin D  2,000 Units Oral Daily    insulin lispro  0-4 Units SubCUTAneous 4x Daily AC & HS       Diet  Current diet/supplement order: ADULT DIET; Regular; 5 carb choices (75 gm/meal)     Recorded PO: PO Meals Eaten (%): 76 - 100% last meal in flowsheets      Action     Reviewed current BG trends and insulin plan.      Reports having access to CGM and medication at home.    Recommend targeting > 70% of time in range.      Recommend follow [facility] with [facility].  Declines services at this time.        Electronically signed by Verito Contreras RN on 10/31/2024 at 1:13 PM  
Patient admitted to PACU # 9 from OR at 1329 post LEFT GREAT TOE AMPUTATION AT METATARSAL JOINT - Left  per Dr Bruno.  Attached to PACU monitoring system and report received from anesthesia provider.  Patient was reported to be hemodynamically stable during procedure.  Patient drowsy on admission and denied pain.  
Patient arrived to unit. Patient has daptomycin scheduled for 1700. Patient states he had his dose of daptomycin at home at 0900AM today. Per Dr. Mae, hold 1700 dose tonight and start tomorrow. Pharmacy informed.   Electronically signed by Laura Guy RN on 10/26/2024 at 4:30 PM    
Patient called to report personal glucose monitor is alarming with a blood glucose of 47. POC glucose checked at this time, blood glucose 58. PRN glucose given, see eMAR. Patient rechecked after 15 minutes and glucose came up to 78. Patient reports no s/s of glucose dropping, only notified this RN because phone was alarming of low blood glucose.   
Patient discharged with belongings and durable medical equipment w. Patient given discharge instructions, patient verbalized understanding, no questions at this time. Prescriptions given to patient/family.   Electronically signed by JAN ARZOLA RN on 10/31/2024 at 1:15 PM    
Patient has arrived back to the unit with transport. Dressing to left foot is clean, dry and intact. Patient denies pain at present. Fall precautions are in place. Patient instructed on NWB status. VSS taken. Will continue to monitor patient  per unit protocols. Electronically signed by Adela Zuleta RN on 10/30/2024 at 2:10 PM  \  
Patient has left the unit with transport for surgery. Surgical consent has been signed and surgical checklist completed. Report given to TELLO Andrade. Electronically signed by Adela Zuleta RN on 10/30/2024 at 10:23 AM    
Patient is A&O X4. Patient is resting in bed, awake and quiet. Room air. Side rails are up x2. Fall precautions are in place. Bed alarm on. Bed is in lowest position. Call light, telephone and bedside table are within reach. VSS taken. AM meds given. Shift assessment completed. Will continue to monitor patient per unit protocols. Electronically signed by Adela Zuleta RN on 10/28/2024 at 12:50 PM    
Patient is A&O X4. Patient is resting in bed, awake and quiet. Room air. Side rails are up x2. Fall precautions are in place. Bed alarm on. Bed is in lowest position. Call light, telephone and bedside table are within reach. VSS taken. AM meds given. Shift assessment completed. Will continue to monitor patient per unit protocols. Electronically signed by Adela Zuleta RN on 10/29/2024 at 11:13 AM    
Patient is alert & oriented x4, resting in bed, on room air. Gets up independently. IV abx infusing in [facility] PICC. 2/4 bed rails up, bed in lowest position, fall precautions in place, call light within reach. Morning medications given as ordered, tolerated well. No complaints of pain. Patient denies further needs at this time. Will cont to monitor and reassess.  Electronically signed by Laura Guy RN on 10/27/2024 at 11:09 AM    
Patient is resting in bed, awake and quiet. Room air. Side rails are up x2. Fall precautions are in place. Bed alarm on. Bed is in lowest position. Call light, telephone and bedside table are within reach. Electronically signed by Adela Zuleta RN on 10/28/2024 at 6:53 PM    
Patient is resting in bed, awake and quiet. Room air. Side rails are up x2. Fall precautions are in place. Patient is   UAL. Bed is in lowest position. Call light, telephone and bedside table are within reach. Electronically signed by Adela Zuleta RN on 10/30/2024 at 6:39 PM    
Patient is resting in bed, awake and quiet. Room air. Side rails are up x2. Fall precautions are in place. Patient is  UAL. Bed is in lowest position. Call light, telephone and bedside table are within reach. Patient aware of NPO status at midnight. Electronically signed by Adela Zuleta RN on 10/29/2024 at 6:08 PM    
Patient's telemetry order is expiring. Perfect serve message sent to Dr. Mae if need to renew order. Per Dr. Mae, ok to d/c tele order.   Electronically signed by Laura Guy RN on 10/27/2024 at 2:41 PM    
Pharmacy Medication Reconciliation Note     List of medications patient is currently taking is complete.    Source of information:   1. Discharge instructions from [facility] admission 9 days ago      Notes regarding home medications:   1. none      Denies taking any other OTC or herbal medications    Asad Livingston RPH, Efrain 10/28/2024 8:09 AM      
Podiatric Surgery Daily Progress Note  Tony Boyle      Subjective :   Patient seen and examined this am at the bedside. Patient denies any acute overnight events. Pt denies pain to LLE. Patient denies N/V/F/C/SOB. Patient denies calf pain, thigh pain, chest pain.     Review of Systems: A 12 point review of symptoms is unremarkable with the exception of the chief complaint. Patient specifically denies nausea, fever, vomiting, chills, shortness of breath, chest pain, abdominal pain, constipation or difficulty urinating.       Objective     BP (!) 149/81   Pulse 64   Temp 98.2 °F (36.8 °C)   Resp 16   Ht 1.854 m (6' 1\")   Wt 122.1 kg (269 lb 2.9 oz)   SpO2 95%   BMI 35.51 kg/m²      I/O:  Intake/Output Summary (Last 24 hours) at 10/31/2024 1149  Last data filed at 10/31/2024 0846  Gross per 24 hour   Intake 1220 ml   Output --   Net 1220 ml              Wt Readings from Last 3 Encounters:   10/30/24 122.1 kg (269 lb 2.9 oz)   10/19/24 123.1 kg (271 lb 6.2 oz)   10/07/24 122.2 kg (269 lb 6.4 oz)       LABS:    Recent Labs     10/30/24  0915 10/31/24  0440   WBC 5.0 7.1   HGB 13.4* 12.5*   HCT 39.6* 37.5*    232        Recent Labs     10/31/24  0440      K 4.1      CO2 28   BUN 11   CREATININE 0.6*        Recent Labs     10/28/24  1449   INR 0.95           LOWER EXTREMITY EXAMINATION    Dressing to Left lower extremity left clean, dry, and intact.  No strikethrough noted to external dressing.    CFT brisk to remaining digits bilateral.  Sensation diminished to digits bilateral.  No pain with calf compression bilateral.  Patient able to perform active range of motion to digits bilateral.      IMAGING:  Vascular duplex lower extremity arteries left (pending)     XR left foot (10/26/2024)   FINDINGS:  The patient has had partial amputation of the great toe at the distal 1st  phalanx.  There is no evidence for any bony lysis to suggest osteomyelitis.  There is no gas within the soft tissues.   
Pre-Operative Note  Resident Note     Patient: Tony Boyle      Procedure: Great toe amputation at metatarsal joint, left     Consent: In chart     Labs:        Recent Labs     10/28/24  0530 10/29/24  0540   WBC 6.2 6.0   HGB 12.6* 12.5*   HCT 38.1* 37.5*   MCV 89.0 88.4    252        Recent Labs     10/28/24  0530 10/29/24  0540   * 145   K 3.7 3.6    106   CO2 28 30   BUN 13 13   CREATININE 0.6* 0.6*      No results for input(s): \"AST\", \"ALT\", \"BILIDIR\", \"BILITOT\", \"ALKPHOS\" in the last 72 hours.    Invalid input(s): \"ALB\"   No results for input(s): \"LIPASE\", \"AMYLASE\" in the last 72 hours.     Recent Labs     10/28/24  1449   INR 0.95      No results for input(s): \"CKTOTAL\", \"CKMB\", \"CKMBINDEX\", \"TROPONINI\" in the last 72 hours.    Saline lock/IV access: Yes    Clearance for surgery: Yes per medicine     Diet: NPO at midnight     CXR: No acute findings, PICC line in place     EKG: See cardiology section of EMR for detailed description     Echocardiogram: not done    PT/INR: 12.9/0.95    IVF: PICC line in place     Abx: scheduled dapto, meropenem     Type and Screen:   ABO/Rh O POS   Antibody Screen NEG         Known risk factors for perioperative complications: Diabetes Mellitus, cardiomyopathy       Anesthesia to see patient    Marcelino Canseco DPM, MS    Podiatric Resident, PGY-1   [employer]   Pager Number: [phone]  10/30/2024   
Pt arrived to unit at 1612. Pt is alert and oriented X4. Pt oriented to unit and to room. Pt oriented to call light and to phone. White board updated. Pt denies any further needs at this time. Will continue to monitor and assess.   PICC line in place RUE.   Electronically signed by Laura Guy RN on 10/26/2024 at 4:18 PM    
Pt is alert and oriented x4, resting quietly in bed. Nightly medications and intake tolerated well. Pt NPO since 0000 for procedure today. No complaints of nausea, vomiting, or pain at this time. Dressing to foot CDI. No other needs made known at this time. Fall precautions in place. Call light within reach. Will continue to monitor.    Electronically signed by Miguel Hampton RN on 10/30/2024 at 12:54 AM    
Report called to lindsey on 3 west. Vitals stable. Will transport to floor.   
[facility]  Glycemic Control       NAME: Tony Boyle  MEDICAL RECORD NUMBER:  [id number]  AGE: 68 y.o.   GENDER: male  : 1955  EPISODE DATE:  10/30/2024     Data     Recent Labs     10/29/24  1631 10/29/24  2009 10/30/24  0204 10/30/24  0623 10/30/24  1025 10/30/24  1331   POCGLU 220* 238* 164* 157* 117* 98       HgBA1c:    Lab Results   Component Value Date/Time    LABA1C 9.9 10/12/2024 05:06 AM       BUN/Creatinine:    Lab Results   Component Value Date/Time    BUN 12 10/30/2024 09:15 AM    CREATININE 0.6 10/30/2024 09:15 AM       Medications  Scheduled Medications:   insulin glargine  45 Units SubCUTAneous Daily    sodium chloride flush  5-40 mL IntraVENous 2 times per day    [Held by provider] enoxaparin  30 mg SubCUTAneous BID    meropenem  1,000 mg IntraVENous Q8H    DAPTOmycin (CUBICIN) 750 mg in sodium chloride 0.9 % 50 mL IVPB  6 mg/kg IntraVENous Q24H    amLODIPine  5 mg Oral Daily    clonazePAM  1 mg Oral Nightly    venlafaxine  75 mg Oral Daily with breakfast    lisinopril  20 mg Oral Daily    pantoprazole  40 mg Oral Daily    propranolol  40 mg Oral BID    [Held by provider] rosuvastatin  10 mg Oral Nightly    tadalafil  5 mg Oral Daily    traZODone  200 mg Oral Nightly    Vitamin D  2,000 Units Oral Daily    insulin lispro  0-4 Units SubCUTAneous 4x Daily AC & HS       Diet  Current diet/supplement order: ADULT DIET; Regular; 5 carb choices (75 gm/meal)     Recorded PO: PO Meals Eaten (%): 76 - 100% last meal in flowsheets      Action     Declined Lantus pre -op and post op today for fear of hypoglycemia.  He is receptive to starting Lantus in the morning.      Suspect home insulin is not consistently taken    Recommend inpatient target 140 - 180.      Recommend weight based Lantus and Humalog.       Physician Notified of event: Yes   Willam Mae MD    Responce     Medication plan updated: Yes      Electronically signed by Verito Contreras RN on 10/30/2024 at 3:29 PM  
[facility]  Hypoglycemia Event and Prevention Plan      NAME: Tony Boyle  MEDICAL RECORD NUMBER:  [id number]  AGE: 68 y.o.   GENDER: male  : 1955  EPISODE DATE:  10/28/2024     Data     Recent Labs     10/27/24  0720 10/27/24  1114 10/27/24  1635 10/27/24  2017 10/28/24  0735 10/28/24  1119   POCGLU 72 104* 133* 213* 72 107*        Latest Reference Range & Units 10/27/24 05:00   Glucose 70 - 99 mg/dL 60 (L)   (L): Data is abnormally low    HgBA1c:    Lab Results   Component Value Date/Time    LABA1C 9.9 10/12/2024 05:06 AM       BUN/Creatinine:    Lab Results   Component Value Date/Time    BUN 13 10/28/2024 05:30 AM    CREATININE 0.6 10/28/2024 05:30 AM        Latest Reference Range & Units 10/28/24 05:30   Est, Glom Filt Rate >60  >90       Medications  Scheduled Medications:   insulin glargine  56 Units SubCUTAneous Daily    sodium chloride flush  5-40 mL IntraVENous 2 times per day    enoxaparin  30 mg SubCUTAneous BID    meropenem  1,000 mg IntraVENous Q8H    DAPTOmycin (CUBICIN) 750 mg in sodium chloride 0.9 % 50 mL IVPB  6 mg/kg IntraVENous Q24H    amLODIPine  5 mg Oral Daily    clonazePAM  1 mg Oral Nightly    venlafaxine  75 mg Oral Daily with breakfast    lisinopril  20 mg Oral Daily    pantoprazole  40 mg Oral Daily    propranolol  40 mg Oral BID    [Held by provider] rosuvastatin  10 mg Oral Nightly    tadalafil  5 mg Oral Daily    traZODone  200 mg Oral Nightly    Vitamin D  2,000 Units Oral Daily    insulin lispro  0-4 Units SubCUTAneous 4x Daily AC & HS       Diet  Current diet/supplement order: ADULT DIET; Regular  Diet NPO     Recorded PO: PO Meals Eaten (%): 76 - 100% last meal in flowsheets      Action      Chart reviewed. Prior to admission, pt prescribed the following DM medication(s): Xigduo XR- take one tablet by mouth daily, Degludec- inject 70 units into the skin daily, and Semaglutide- inject 1mg into the skin once a week.     Glucose Target: 140-180 (inpt), 
This RN has assumed care of pt. Report received from TELLO Mcmahan, all questions answered.   
[facility]  Diabetes Education   Progress Note       NAME:  Tony Boyle  MEDICAL RECORD NUMBER:  [id number]  AGE: 68 y.o.   GENDER: male  : 1955  TODAY'S DATE:  10/29/2024    Subjective   Reason for Diabetes Education Evaluation and Assessment: Hypolist     Visit Type: evaluation      Tony Boyle is a 68 y.o. male referred by:  [x] Chart Review/Hypolist    Chart reviewed. Prior to admission, pt prescribed the following DM medication(s): Xigduo XR- take one tablet by mouth daily, Degludec- inject 70 units into the skin daily, and Semaglutide- inject 1mg into the skin once a week.      Met with pt. Pt voiced he has been living with diabetes for the last 25-30years. Pt currently living with his daughter after the death of his wife- and losing his job at a [employer] company, where he was employed for 47 years. This all occired over the past year per pt. Pt stated he has limited options and resources to cope. Pt stated he vacatons and meets people on the internet to have variety in his life. Pt denies eating a specific diet. Pt uses a CGM (Dexcom G7- placed on his abdomen currently. Pt calibrates his Dexcom with glucometer(s) both here and when at home, especially the day after he places his device. Pt able to name his medications. Pt stated he usually omits his Tresiba once a week. Pt stated he doesn't regularly use his [place] due to the GI SE he gets, \"severe diarrhea\".     PAST MEDICAL HISTORY        Diagnosis Date    Depression     Diabetes mellitus (HCC)     GERD (gastroesophageal reflux disease)     Hyperlipidemia     Hypertension     LBBB (left bundle branch block)     SVT (supraventricular tachycardia) (HCC)        PAST SURGICAL HISTORY    Past Surgical History:   Procedure Laterality Date    COLONOSCOPY      COLONOSCOPY N/A 3/1/2024    COLONOSCOPY POLYPECTOMY SNARE/COLD BIOPSY performed by Ambrocio Coffey MD at [facility] ENDOSCOPY    ENDOSCOPY, COLON, DIAGNOSTIC      EYE SURGERY Right 
Xrays completed at bedside.   
   KNEE SURGERY Right     meninscus repair    PACEMAKER INSERTION      had 8 seconds heart stop    TOE AMPUTATION Left 10/16/2024    PARTIAL HALLUX AMPUTATION LEFT FOOT performed by Jimbo Bruno DPM at [facility] OR    TONSILLECTOMY      UPPER GASTROINTESTINAL ENDOSCOPY N/A 3/1/2024    ESOPHAGOGASTRODUODENOSCOPY DILATATION performed by Ambrocio Coffey MD at [facility] ENDOSCOPY       Current Medications:    Outpatient Medications Marked as Taking for the 10/26/24 encounter (Hospital Encounter)   Medication Sig Dispense Refill    Continuous Glucose Sensor (DEXCOM G7 SENSOR) MISC 1 Device by Does not apply route every 10 days      DAPTOmycin (CUBICIN) infusion Infuse 500 mg intravenously every 24 hours for 14 days Compound per protocol. 7 g 0    meropenem (MERREM) infusion Infuse 1,000 mg intravenously in the morning and 1,000 mg at noon and 1,000 mg in the evening. Do all this for 11 days. Compound per protocol.. 1 each 0    amLODIPine (NORVASC) 5 MG tablet Take 1 tablet by mouth daily 30 tablet 0    tadalafil (CIALIS) 5 MG tablet Take 1 tablet by mouth daily      propranolol (INDERAL) 40 MG tablet Take 1 tablet by mouth 2 times daily      traZODone (DESYREL) 100 MG tablet Take 2 tablets by mouth nightly      lisinopril (PRINIVIL;ZESTRIL) 20 MG tablet Take 1 tablet by mouth daily      vitamin D (VITAMIN D3) 50 MCG (2000 UT) CAPS capsule Take 1 capsule by mouth daily      nitroGLYCERIN (NITRO-DUR) 0.1 MG/HR Place 1 patch onto the skin daily 30 patch 3    Dapagliflozin Pro-metFORMIN ER (XIGDUO XR)  MG TB24 Take 1 tablet by mouth daily      Insulin Degludec 200 UNIT/ML SOPN Inject 70 Units into the skin daily      Semaglutide, 1 MG/DOSE, 2 MG/1.5ML SOPN Inject 1 mg into the skin once a week      pantoprazole (PROTONIX) 40 MG tablet Take 1 tablet by mouth daily      clonazePAM (KLONOPIN) 1 MG tablet Take 1 tablet by mouth nightly.      desvenlafaxine succinate (PRISTIQ) 100 MG TB24 extended release tablet Take 1 tablet by 
Glucose greater than 70 mg/dl: Yes      Medication plan updated: Yes- notified with concerns of basal previous day, basal on hold at this time     Electronically signed by Esther Lemon RN on 10/29/2024 at 7:46 AM  
has had partial amputation of the great toe at the distal 1st phalanx.  There is no evidence for any bony lysis to suggest osteomyelitis. There is no gas within the soft tissues.     Partial amputation of the great toe at the distal 1st phalanx. No radiographic evidence for osteomyelitis.  No gas in the soft tissues.       CBC:   Recent Labs     10/26/24  1149 10/27/24  0500 10/28/24  0530   WBC 5.2 6.2 6.2   HGB 13.5 13.1* 12.6*    249 240     BMP:    Recent Labs     10/26/24  1149 10/27/24  0500 10/28/24  0530    142 146*   K 4.3 3.5 3.7    103 106   CO2 29 31 28   BUN 17 13 13   CREATININE 0.7* 0.6* 0.6*   GLUCOSE 202* 60* 62*     Hepatic:   Recent Labs     10/26/24  1149   AST 22   ALT 11   BILITOT <0.2   ALKPHOS 103     Lipids:   Lab Results   Component Value Date/Time    CHOL 149 01/21/2018 06:31 AM    HDL 50 01/21/2018 06:31 AM    TRIG 187 01/21/2018 06:31 AM     Hemoglobin A1C:   Lab Results   Component Value Date/Time    LABA1C 9.9 10/12/2024 05:06 AM     TSH: No results found for: \"TSH\"  Troponin: No results found for: \"TROPONINT\"  Lactic Acid: No results for input(s): \"LACTA\" in the last 72 hours.  BNP: No results for input(s): \"PROBNP\" in the last 72 hours.  UA:  Lab Results   Component Value Date/Time    NITRU Negative 10/26/2024 04:55 PM    COLORU Yellow 10/26/2024 04:55 PM    PHUR 5.5 10/26/2024 04:55 PM    CLARITYU Clear 10/26/2024 04:55 PM    LEUKOCYTESUR Negative 10/26/2024 04:55 PM    UROBILINOGEN 0.2 10/26/2024 04:55 PM    BILIRUBINUR Negative 10/26/2024 04:55 PM    BLOODU Negative 10/26/2024 04:55 PM    GLUCOSEU >=1000 10/26/2024 04:55 PM    KETUA Negative 10/26/2024 04:55 PM     Urine Cultures: No results found for: \"LABURIN\"  Blood Cultures:   Lab Results   Component Value Date/Time    BC No Growth after 4 days of incubation. 10/10/2024 11:19 AM     Lab Results   Component Value Date/Time    BLOODCULT2 No Growth after 4 days of incubation. 10/10/2024 11:33 AM     Organism: 
worsening redness of foot, LLE TECHNOLOGIST PROVIDED HISTORY: Reason for exam:->history of osteomyelitis, worsening redness of foot, LLE Reason for Exam: history of osteomyelitis, worsening redness of foot, LLE. partial hallux amputation 10/16/2024 FINDINGS: The patient has had partial amputation of the great toe at the distal 1st phalanx.  There is no evidence for any bony lysis to suggest osteomyelitis. There is no gas within the soft tissues.     Partial amputation of the great toe at the distal 1st phalanx. No radiographic evidence for osteomyelitis.  No gas in the soft tissues.       CBC:   Recent Labs     10/28/24  0530 10/29/24  0540 10/30/24  0915   WBC 6.2 6.0 5.0   HGB 12.6* 12.5* 13.4*    252 252     BMP:    Recent Labs     10/28/24  0530 10/29/24  0540 10/30/24  0915   * 145 142   K 3.7 3.6 4.2    106 105   CO2 28 30 30   BUN 13 13 12   CREATININE 0.6* 0.6* 0.6*   GLUCOSE 62* 63* 139*     Hepatic:   No results for input(s): \"AST\", \"ALT\", \"BILITOT\", \"ALKPHOS\" in the last 72 hours.    Invalid input(s): \"ALB\"    Lipids:   Lab Results   Component Value Date/Time    CHOL 149 01/21/2018 06:31 AM    HDL 50 01/21/2018 06:31 AM    TRIG 187 01/21/2018 06:31 AM     Hemoglobin A1C:   Lab Results   Component Value Date/Time    LABA1C 9.9 10/12/2024 05:06 AM     TSH: No results found for: \"TSH\"  Troponin: No results found for: \"TROPONINT\"  Lactic Acid: No results for input(s): \"LACTA\" in the last 72 hours.  BNP: No results for input(s): \"PROBNP\" in the last 72 hours.  UA:  Lab Results   Component Value Date/Time    NITRU Negative 10/26/2024 04:55 PM    COLORU Yellow 10/26/2024 04:55 PM    PHUR 5.5 10/26/2024 04:55 PM    CLARITYU Clear 10/26/2024 04:55 PM    LEUKOCYTESUR Negative 10/26/2024 04:55 PM    UROBILINOGEN 0.2 10/26/2024 04:55 PM    BILIRUBINUR Negative 10/26/2024 04:55 PM    BLOODU Negative 10/26/2024 04:55 PM    GLUCOSEU >=1000 10/26/2024 04:55 PM    KETUA Negative 10/26/2024 04:55 PM 
after 4 days of incubation. 10/10/2024 11:19 AM     Lab Results   Component Value Date/Time    BLOODCULT2 No Growth after 4 days of incubation. 10/10/2024 11:33 AM     Organism:   Lab Results   Component Value Date/Time    ORG Escherichia coli 10/10/2024 11:18 AM    ORG Staphylococcus epidermidis 10/10/2024 11:18 AM    ORG Enterobacter cloacae complex 10/10/2024 11:18 AM       Medical decision making:     Left foot cellulitis  placing patient at risk of multiple comorbidities including threat to bodily function    Drugs that require monitoring for toxicity include lovenox and the method of monitoring was H&H for anemia    Discussed management with RN and discharge planning options with Case Management   XR foot as interpreted by me showing no osteomyelitis  Personally reviewed lab test results  Personally reviewed studies   Reviewed prior external records in detail  Reviewed nursing notes overnight    Total time spent : 55 min of which >50% of the time was spent counseling/coordination of care and majority of the time involved discussion of test results, diagnostic or treatment recommendations, prognosis, risks and benefits of management options, instructions, education, compliance or risk factor reduction. The time includes time at the bedside, data interpretation, medication management, monitoring for potential decompensation and physician consultations.     Overall the complexity of medical decision making was high    Carmelita Quarles MD    
elevated R79.82    Elevated sed rate R70.0    Type 2 diabetes mellitus with left diabetic foot infection (HCC) E11.628, L08.9        ICD-10-CM    1. Cellulitis of left foot  L03.116         Complicated left diabetic foot infection  Left great toe osteomyelitis  Status post left great toe amputation on previous admission  MDRO infection  Left foot cellulitis  Left foot diabetic neuropathy  AICD in place  ESR  CRP elevation  Receiving IV antibiotic as outpatient  Morbid obesity BMI 35    Given the exam and non healing wound there is a plan for revision of the Left Great toe stump and cont IV abx and will check Arterial duplex given the complicated course for any underlying PVD  -       Labs, Microbiology, Radiology and all the pertinent results from current hospitalization and  care every where were reviewed  by me as a part of the evaluation   Plan:   IV meropenem 1 g every 8 hours  Continue IV daptomycin x 750  mg Q 24 HR  Hold rosuvastatin while on daptomycin  Trend CK  Trend ESR  Trend CRP  Check arterial duplex scan lower extremities  Wound culture in process so far negative  May go for revision of the Left great toe stump ?  Hold Crestor while on Daptomycin     Discussed with patient/Family and Nursing staff     Medical Decision Making:  The following items were considered in medical decision making:  Discussion of patient care with other providers  Reviewed clinical lab tests  Reviewed radiology tests  Reviewed other diagnostic tests/interventions  Independent review of radiologic images  Microbiology cultures and other micro tests reviewed     Risk of Complications/Morbidity: High      Illness(es)/ Infection present that pose threat to bodily function.   There is potential for severe exacerbation of infection/side effects of treatment.  Therapy requires intensive monitoring for antimicrobial agent toxicity.   Thanks for allowing me to participate in your patient's care and please call me with any questions or 
left foot L03.032    Class 2 obesity due to excess calories with body mass index (BMI) of 35.0 to 35.9 in adult E66.812, E66.09, Z68.35    Gastroesophageal reflux disease without esophagitis K21.9    CRP elevated R79.82    Elevated sed rate R70.0    Type 2 diabetes mellitus with left diabetic foot infection (HCC) E11.628, L08.9    Receiving intravenous antibiotic treatment as outpatient Z79.2    Toe osteomyelitis, left M86.9        ICD-10-CM    1. Cellulitis of left foot  L03.116       2. Cellulitis of fifth toe of left foot  L03.032 CANCELED: Vascular duplex lower extremity arteries left     CANCELED: Vascular duplex lower extremity arteries left      3. Diabetic ulcer of toe of left foot associated with type 2 diabetes mellitus, with necrosis of muscle (HCC)  E11.621 CANCELED: Vascular duplex lower extremity arteries left    L97.523 CANCELED: Vascular duplex lower extremity arteries left      4. Acute osteomyelitis-ankle/foot, left  M86.172 Surgical Pathology     Surgical Pathology        Complicated left diabetic foot infection  Left great toe osteomyelitis  Status post left great toe amputation on previous admission  MDRO infection  Left foot cellulitis  Left foot diabetic neuropathy  AICD in place  ESR  CRP elevation  Receiving IV antibiotic as outpatient  Morbid obesity BMI 35    Given the exam and non healing wound there is a plan for revision of the Left Great toe stump and cont IV abx     Status post left great toe amputation at the metatarsophalangeal joint per operative report felt to be definitive pathology pending x-rays reviewed with there was adequate bleeding at the surgical site  Will update DAWSON for discharge planning anticipate to continue IV antibiotics for 2 to 3 weeks with follow-up in the clinic        Labs, Microbiology, Radiology and all the pertinent results from current hospitalization and  care every where were reviewed  by me as a part of the evaluation   Plan:   IV meropenem 1 g every 8

## 2024-10-31 NOTE — ANESTHESIA POSTPROCEDURE EVALUATION
Department of Anesthesiology  Postprocedure Note    Patient: Tony Boyle  MRN: [id number]  YOB: 1955  Date of evaluation: 10/31/2024    Procedure Summary       Date: 10/30/24 Room / Location: [address]    Anesthesia Start: 1235 Anesthesia Stop: 1325    Procedure: LEFT GREAT TOE AMPUTATION AT METATARSAL JOINT (Left: Foot) Diagnosis:       Acute osteomyelitis-ankle/foot, left      (Acute osteomyelitis-ankle/foot, left [M86.172])    Surgeons: Jimbo Bruno DPM Responsible Provider: Christian Olea MD    Anesthesia Type: MAC ASA Status: 3            Anesthesia Type: MAC    Stephen Phase I: Stephen Score: 8    Stephen Phase II:      Anesthesia Post Evaluation    Patient location during evaluation: PACU  Patient participation: complete - patient participated  Level of consciousness: awake  Airway patency: patent  Nausea & Vomiting: no nausea and no vomiting  Cardiovascular status: hemodynamically stable and blood pressure returned to baseline  Respiratory status: spontaneous ventilation, nonlabored ventilation and room air  Hydration status: stable  Comments: Uneventful MAC anesthetic. Appropriate for return to floor.   Pain management: adequate    No notable events documented.

## 2024-11-01 NOTE — DISCHARGE SUMMARY
Name:  Tony Boyle /Age/Sex: 1955 (68 y.o. male)   Admit Date: 10/26/2024  Discharge Date: 10/31/2024    MRN & CSN:  [id number] & [id number] Encounter Date : 10/31/2024    Attending:  No att. providers found Discharging Provider: Willam Mae MD       Hospital Course:      Tony Boyle is a 68 y.o. male who presented with     Chief Complaint   Patient presents with    Wound Infection     Pt states that he has been getting at home iv antibiotics, and states that \" they marked my leg yesterday and the reddness is going everywhere         The patient was being managed in the hospital for    Left foot cellulitis : Recently discharged on 10/19 with IV meropenem till  for osteomyelitis of left fifth toe. Followed with podiatry 10/22 and were concerned about worsening cellulitis. Infectious disease added IV daptomycin on 10/22. XR foot negative for osteomyelitis. Wound culture negative so far. Inflammatory markers elevated but at baseline. Patient had left great toe amputation at metatarsal joint by podiatry on 10/30 and the patient was discharged on IV meropenem and daptomycin till  and was advised to hold his rosuvastatin while on daptomycin    The patient expressed appropriate understanding of, and agreement with the discharge recommendations, medications, and plan.     Consults this admission:  IP CONSULT TO INFECTIOUS DISEASES  IP CONSULT TO HOSPITALIST  IP CONSULT TO PODIATRY  IP CONSULT TO HOME CARE NEEDS    Discharge Diagnosis:   Osteomyelitis of fifth toe of left foot    Discharge Instruction:   Follow up appointments: Primary care physician: Hebert Bolivar MD within 1 week  Activity: activity as tolerated  Condition on discharge: Stable    Discharge Medications:        Medication List        CONTINUE taking these medications      amLODIPine 5 MG tablet  Commonly known as: NORVASC  Take 1 tablet by mouth daily     clonazePAM 1 MG tablet  Commonly known as: KLONOPIN

## 2024-11-04 LAB
ALBUMIN SERPL-MCNC: 3.9 G/DL (ref 3.4–5)
ALBUMIN/GLOB SERPL: 1.4 {RATIO} (ref 1.1–2.2)
ALP SERPL-CCNC: 97 U/L (ref 40–129)
ALT SERPL-CCNC: 14 U/L (ref 10–40)
ANION GAP SERPL CALCULATED.3IONS-SCNC: 10 MMOL/L (ref 3–16)
AST SERPL-CCNC: 24 U/L (ref 15–37)
BASOPHILS # BLD: 0 K/UL (ref 0–0.2)
BASOPHILS NFR BLD: 0.2 %
BILIRUB SERPL-MCNC: 0.3 MG/DL (ref 0–1)
BUN SERPL-MCNC: 20 MG/DL (ref 7–20)
CALCIUM SERPL-MCNC: 9.4 MG/DL (ref 8.3–10.6)
CHLORIDE SERPL-SCNC: 101 MMOL/L (ref 99–110)
CK SERPL-CCNC: 52 U/L (ref 39–308)
CO2 SERPL-SCNC: 28 MMOL/L (ref 21–32)
CREAT SERPL-MCNC: 0.7 MG/DL (ref 0.8–1.3)
CRP SERPL-MCNC: 12.9 MG/L (ref 0–5.1)
DEPRECATED RDW RBC AUTO: 15.7 % (ref 12.4–15.4)
EOSINOPHIL # BLD: 0.1 K/UL (ref 0–0.6)
EOSINOPHIL NFR BLD: 1.4 %
ERYTHROCYTE [SEDIMENTATION RATE] IN BLOOD BY WESTERGREN METHOD: 33 MM/HR (ref 0–20)
GFR SERPLBLD CREATININE-BSD FMLA CKD-EPI: >90 ML/MIN/{1.73_M2}
GLUCOSE SERPL-MCNC: 125 MG/DL (ref 70–99)
HCT VFR BLD AUTO: 40.9 % (ref 40.5–52.5)
HGB BLD-MCNC: 13.8 G/DL (ref 13.5–17.5)
LYMPHOCYTES # BLD: 2.6 K/UL (ref 1–5.1)
LYMPHOCYTES NFR BLD: 41.3 %
MCH RBC QN AUTO: 29.6 PG (ref 26–34)
MCHC RBC AUTO-ENTMCNC: 33.6 G/DL (ref 31–36)
MCV RBC AUTO: 88.1 FL (ref 80–100)
MONOCYTES # BLD: 0.7 K/UL (ref 0–1.3)
MONOCYTES NFR BLD: 10.7 %
NEUTROPHILS # BLD: 2.9 K/UL (ref 1.7–7.7)
NEUTROPHILS NFR BLD: 46.4 %
PLATELET # BLD AUTO: 237 K/UL (ref 135–450)
PMV BLD AUTO: 8.6 FL (ref 5–10.5)
POTASSIUM SERPL-SCNC: 4.6 MMOL/L (ref 3.5–5.1)
PROT SERPL-MCNC: 6.6 G/DL (ref 6.4–8.2)
RBC # BLD AUTO: 4.64 M/UL (ref 4.2–5.9)
SODIUM SERPL-SCNC: 139 MMOL/L (ref 136–145)
WBC # BLD AUTO: 6.3 K/UL (ref 4–11)

## 2024-11-11 LAB
ALBUMIN SERPL-MCNC: 4.1 G/DL (ref 3.4–5)
ALBUMIN/GLOB SERPL: 1.5 {RATIO} (ref 1.1–2.2)
ALP SERPL-CCNC: 135 U/L (ref 40–129)
ALT SERPL-CCNC: 10 U/L (ref 10–40)
ANION GAP SERPL CALCULATED.3IONS-SCNC: 12 MMOL/L (ref 3–16)
AST SERPL-CCNC: 22 U/L (ref 15–37)
BASOPHILS # BLD: 0 K/UL (ref 0–0.2)
BASOPHILS NFR BLD: 0.1 %
BILIRUB SERPL-MCNC: <0.2 MG/DL (ref 0–1)
BUN SERPL-MCNC: 19 MG/DL (ref 7–20)
CALCIUM SERPL-MCNC: 9.7 MG/DL (ref 8.3–10.6)
CHLORIDE SERPL-SCNC: 99 MMOL/L (ref 99–110)
CK SERPL-CCNC: 46 U/L (ref 39–308)
CO2 SERPL-SCNC: 22 MMOL/L (ref 21–32)
CREAT SERPL-MCNC: 0.7 MG/DL (ref 0.8–1.3)
CRP SERPL-MCNC: 31 MG/L (ref 0–5.1)
DEPRECATED RDW RBC AUTO: 15.9 % (ref 12.4–15.4)
EOSINOPHIL # BLD: 0.1 K/UL (ref 0–0.6)
EOSINOPHIL NFR BLD: 0.9 %
ERYTHROCYTE [SEDIMENTATION RATE] IN BLOOD BY WESTERGREN METHOD: 35 MM/HR (ref 0–20)
GFR SERPLBLD CREATININE-BSD FMLA CKD-EPI: >90 ML/MIN/{1.73_M2}
GLUCOSE SERPL-MCNC: 73 MG/DL (ref 70–99)
HCT VFR BLD AUTO: 42.6 % (ref 40.5–52.5)
HGB BLD-MCNC: 13.9 G/DL (ref 13.5–17.5)
LYMPHOCYTES # BLD: 2.1 K/UL (ref 1–5.1)
LYMPHOCYTES NFR BLD: 23 %
MCH RBC QN AUTO: 29.1 PG (ref 26–34)
MCHC RBC AUTO-ENTMCNC: 32.5 G/DL (ref 31–36)
MCV RBC AUTO: 89.5 FL (ref 80–100)
MONOCYTES # BLD: 0.9 K/UL (ref 0–1.3)
MONOCYTES NFR BLD: 9.5 %
NEUTROPHILS # BLD: 6.1 K/UL (ref 1.7–7.7)
NEUTROPHILS NFR BLD: 66.5 %
PLATELET # BLD AUTO: 210 K/UL (ref 135–450)
PMV BLD AUTO: 9.2 FL (ref 5–10.5)
POTASSIUM SERPL-SCNC: 4.2 MMOL/L (ref 3.5–5.1)
PROT SERPL-MCNC: 6.8 G/DL (ref 6.4–8.2)
RBC # BLD AUTO: 4.76 M/UL (ref 4.2–5.9)
SODIUM SERPL-SCNC: 133 MMOL/L (ref 136–145)
WBC # BLD AUTO: 9.2 K/UL (ref 4–11)

## 2024-11-14 ENCOUNTER — HOSPITAL ENCOUNTER (OUTPATIENT)
Dept: WOUND CARE | Age: 69
Discharge: HOME | End: 2024-11-14
Attending: PODIATRIST
Payer: MEDICARE

## 2024-11-14 VITALS
SYSTOLIC BLOOD PRESSURE: 138 MMHG | RESPIRATION RATE: 16 BRPM | DIASTOLIC BLOOD PRESSURE: 72 MMHG | TEMPERATURE: 97.2 F | HEART RATE: 91 BPM

## 2024-11-14 DIAGNOSIS — L97.529 DIABETIC ULCER OF LEFT GREAT TOE (HCC): Primary | ICD-10-CM

## 2024-11-14 DIAGNOSIS — E11.621 DIABETIC ULCER OF LEFT GREAT TOE (HCC): Primary | ICD-10-CM

## 2024-11-14 PROCEDURE — 99212 OFFICE O/P EST SF 10 MIN: CPT

## 2024-11-14 RX ORDER — BETAMETHASONE DIPROPIONATE 0.5 MG/G
CREAM TOPICAL ONCE
OUTPATIENT
Start: 2024-11-14 | End: 2024-11-14

## 2024-11-14 RX ORDER — GINSENG 100 MG
CAPSULE ORAL ONCE
OUTPATIENT
Start: 2024-11-14 | End: 2024-11-14

## 2024-11-14 RX ORDER — MUPIROCIN 20 MG/G
OINTMENT TOPICAL ONCE
OUTPATIENT
Start: 2024-11-14 | End: 2024-11-14

## 2024-11-14 RX ORDER — SODIUM CHLOR/HYPOCHLOROUS ACID 0.033 %
SOLUTION, IRRIGATION IRRIGATION ONCE
OUTPATIENT
Start: 2024-11-14 | End: 2024-11-14

## 2024-11-14 RX ORDER — TRIAMCINOLONE ACETONIDE 1 MG/G
OINTMENT TOPICAL ONCE
OUTPATIENT
Start: 2024-11-14 | End: 2024-11-14

## 2024-11-14 RX ORDER — NEOMYCIN/BACITRACIN/POLYMYXINB 3.5-400-5K
OINTMENT (GRAM) TOPICAL ONCE
OUTPATIENT
Start: 2024-11-14 | End: 2024-11-14

## 2024-11-14 RX ORDER — GENTAMICIN SULFATE 1 MG/G
OINTMENT TOPICAL ONCE
OUTPATIENT
Start: 2024-11-14 | End: 2024-11-14

## 2024-11-14 RX ORDER — LIDOCAINE HYDROCHLORIDE 20 MG/ML
JELLY TOPICAL ONCE
OUTPATIENT
Start: 2024-11-14 | End: 2024-11-14

## 2024-11-14 RX ORDER — LIDOCAINE 40 MG/G
CREAM TOPICAL ONCE
OUTPATIENT
Start: 2024-11-14 | End: 2024-11-14

## 2024-11-14 RX ORDER — LIDOCAINE 50 MG/G
OINTMENT TOPICAL ONCE
OUTPATIENT
Start: 2024-11-14 | End: 2024-11-14

## 2024-11-14 RX ORDER — LIDOCAINE HYDROCHLORIDE 40 MG/ML
SOLUTION TOPICAL ONCE
OUTPATIENT
Start: 2024-11-14 | End: 2024-11-14

## 2024-11-14 RX ORDER — SILVER SULFADIAZINE 10 MG/G
CREAM TOPICAL ONCE
OUTPATIENT
Start: 2024-11-14 | End: 2024-11-14

## 2024-11-14 RX ORDER — BACITRACIN ZINC AND POLYMYXIN B SULFATE 500; 1000 [USP'U]/G; [USP'U]/G
OINTMENT TOPICAL ONCE
OUTPATIENT
Start: 2024-11-14 | End: 2024-11-14

## 2024-11-14 RX ORDER — CLOBETASOL PROPIONATE 0.5 MG/G
OINTMENT TOPICAL ONCE
OUTPATIENT
Start: 2024-11-14 | End: 2024-11-14

## 2024-11-18 LAB
ALBUMIN SERPL-MCNC: 4 G/DL (ref 3.4–5)
ALBUMIN/GLOB SERPL: 1.5 {RATIO} (ref 1.1–2.2)
ALP SERPL-CCNC: 119 U/L (ref 40–129)
ALT SERPL-CCNC: 11 U/L (ref 10–40)
ANION GAP SERPL CALCULATED.3IONS-SCNC: 12 MMOL/L (ref 3–16)
AST SERPL-CCNC: 20 U/L (ref 15–37)
BASOPHILS # BLD: 0 K/UL (ref 0–0.2)
BASOPHILS NFR BLD: 0.6 %
BILIRUB SERPL-MCNC: <0.2 MG/DL (ref 0–1)
BUN SERPL-MCNC: 16 MG/DL (ref 7–20)
CALCIUM SERPL-MCNC: 9.6 MG/DL (ref 8.3–10.6)
CHLORIDE SERPL-SCNC: 100 MMOL/L (ref 99–110)
CK SERPL-CCNC: 49 U/L (ref 39–308)
CO2 SERPL-SCNC: 27 MMOL/L (ref 21–32)
CREAT SERPL-MCNC: 0.6 MG/DL (ref 0.8–1.3)
CRP SERPL-MCNC: 11.5 MG/L (ref 0–5.1)
DEPRECATED RDW RBC AUTO: 15.3 % (ref 12.4–15.4)
EOSINOPHIL # BLD: 0.3 K/UL (ref 0–0.6)
EOSINOPHIL NFR BLD: 4.5 %
ERYTHROCYTE [SEDIMENTATION RATE] IN BLOOD BY WESTERGREN METHOD: 35 MM/HR (ref 0–20)
GFR SERPLBLD CREATININE-BSD FMLA CKD-EPI: >90 ML/MIN/{1.73_M2}
GLUCOSE SERPL-MCNC: 186 MG/DL (ref 70–99)
HCT VFR BLD AUTO: 40.7 % (ref 40.5–52.5)
HGB BLD-MCNC: 13.9 G/DL (ref 13.5–17.5)
LYMPHOCYTES # BLD: 2.2 K/UL (ref 1–5.1)
LYMPHOCYTES NFR BLD: 31.8 %
MCH RBC QN AUTO: 30 PG (ref 26–34)
MCHC RBC AUTO-ENTMCNC: 34.2 G/DL (ref 31–36)
MCV RBC AUTO: 87.7 FL (ref 80–100)
MONOCYTES # BLD: 0.6 K/UL (ref 0–1.3)
MONOCYTES NFR BLD: 8 %
NEUTROPHILS # BLD: 3.9 K/UL (ref 1.7–7.7)
NEUTROPHILS NFR BLD: 55.1 %
PLATELET # BLD AUTO: 225 K/UL (ref 135–450)
PMV BLD AUTO: 9 FL (ref 5–10.5)
POTASSIUM SERPL-SCNC: 4.5 MMOL/L (ref 3.5–5.1)
PROT SERPL-MCNC: 6.7 G/DL (ref 6.4–8.2)
RBC # BLD AUTO: 4.65 M/UL (ref 4.2–5.9)
SODIUM SERPL-SCNC: 139 MMOL/L (ref 136–145)
WBC # BLD AUTO: 7.1 K/UL (ref 4–11)

## 2024-11-19 ENCOUNTER — HOSPITAL ENCOUNTER (OUTPATIENT)
Dept: WOUND CARE | Age: 69
Discharge: HOME | End: 2024-11-19
Attending: PODIATRIST
Payer: MEDICARE

## 2024-11-19 VITALS — TEMPERATURE: 97.4 F | RESPIRATION RATE: 18 BRPM

## 2024-11-19 DIAGNOSIS — L97.529 DIABETIC ULCER OF LEFT GREAT TOE (HCC): Primary | ICD-10-CM

## 2024-11-19 DIAGNOSIS — E11.621 DIABETIC ULCER OF LEFT GREAT TOE (HCC): Primary | ICD-10-CM

## 2024-11-19 PROCEDURE — 99212 OFFICE O/P EST SF 10 MIN: CPT

## 2024-11-19 RX ORDER — GENTAMICIN SULFATE 1 MG/G
OINTMENT TOPICAL ONCE
OUTPATIENT
Start: 2024-11-19 | End: 2024-11-19

## 2024-11-19 RX ORDER — SILVER SULFADIAZINE 10 MG/G
CREAM TOPICAL ONCE
OUTPATIENT
Start: 2024-11-19 | End: 2024-11-19

## 2024-11-19 RX ORDER — BACITRACIN ZINC AND POLYMYXIN B SULFATE 500; 1000 [USP'U]/G; [USP'U]/G
OINTMENT TOPICAL ONCE
OUTPATIENT
Start: 2024-11-19 | End: 2024-11-19

## 2024-11-19 RX ORDER — TRIAMCINOLONE ACETONIDE 1 MG/G
OINTMENT TOPICAL ONCE
OUTPATIENT
Start: 2024-11-19 | End: 2024-11-19

## 2024-11-19 RX ORDER — LIDOCAINE 40 MG/G
CREAM TOPICAL ONCE
OUTPATIENT
Start: 2024-11-19 | End: 2024-11-19

## 2024-11-19 RX ORDER — LIDOCAINE 50 MG/G
OINTMENT TOPICAL ONCE
OUTPATIENT
Start: 2024-11-19 | End: 2024-11-19

## 2024-11-19 RX ORDER — BETAMETHASONE DIPROPIONATE 0.5 MG/G
CREAM TOPICAL ONCE
OUTPATIENT
Start: 2024-11-19 | End: 2024-11-19

## 2024-11-19 RX ORDER — SODIUM CHLOR/HYPOCHLOROUS ACID 0.033 %
SOLUTION, IRRIGATION IRRIGATION ONCE
OUTPATIENT
Start: 2024-11-19 | End: 2024-11-19

## 2024-11-19 RX ORDER — GINSENG 100 MG
CAPSULE ORAL ONCE
OUTPATIENT
Start: 2024-11-19 | End: 2024-11-19

## 2024-11-19 RX ORDER — LIDOCAINE HYDROCHLORIDE 40 MG/ML
SOLUTION TOPICAL ONCE
OUTPATIENT
Start: 2024-11-19 | End: 2024-11-19

## 2024-11-19 RX ORDER — LIDOCAINE HYDROCHLORIDE 20 MG/ML
JELLY TOPICAL ONCE
OUTPATIENT
Start: 2024-11-19 | End: 2024-11-19

## 2024-11-19 RX ORDER — CLOBETASOL PROPIONATE 0.5 MG/G
OINTMENT TOPICAL ONCE
OUTPATIENT
Start: 2024-11-19 | End: 2024-11-19

## 2024-11-19 RX ORDER — NEOMYCIN/BACITRACIN/POLYMYXINB 3.5-400-5K
OINTMENT (GRAM) TOPICAL ONCE
OUTPATIENT
Start: 2024-11-19 | End: 2024-11-19

## 2024-11-19 RX ORDER — LIDOCAINE HYDROCHLORIDE 40 MG/ML
SOLUTION TOPICAL ONCE
Status: DISCONTINUED | OUTPATIENT
Start: 2024-11-19 | End: 2024-11-20 | Stop reason: HOSPADM

## 2024-11-19 RX ORDER — MUPIROCIN 20 MG/G
OINTMENT TOPICAL ONCE
OUTPATIENT
Start: 2024-11-19 | End: 2024-11-19

## 2024-11-19 NOTE — PATIENT INSTRUCTIONS
[facility] Wound Care Physician Orders and Discharge Instructions  [address], [address]        [place], [place] [address]  Telephone: [phone]      FAX [phone]  MONDAY - THURSDAY 8:30 am - 4:30 pm and Friday 8:30 am - 11:30 am      NAME:  Tony Boyle  YOB: 1955  MEDICAL RECORD NUMBER:  [id number]  DATE:  11/19/2024      Return Appointment:  [x] Return Appointment: With Dr. Jimbo Bruno  in  1  Week(s)             [] Nurse Visit for Wound Assessment:  [x] DME/Wound Dressing Supplies Provided by: [employer]    Please call them directly to reorder supplies when you run out)  [x] ECF or Home Healthcare:  [facility]  Your Home Care Agency is responsible to order your supplies.   [] Orders placed during your visit:      Continue IV antibiotics under the direction of Dr. Sheth      Important Reminders:   Please wash hands with soap and water before and after every dressing change.  If you smoke we ask that you refrain from smoking. Smoking inhibits wounds from healing.  When taking antibiotics take the entire prescription as ordered. Do not stop taking until medication is all gone unless otherwise instructed.   Do not get wounds wet in the bath or shower unless otherwise instructed by your physician. If your wound is on your foot or leg, you may purchase a cast bag. Please ask at your local pharmacy.   IF YOU ARE UNABLE TO OBTAIN WOUND SUPPLIES, CONTINUE TO USE THE SUPPLIES YOU HAVE AVAILABLE UNTIL YOU ARE ABLE TO REACH US. IT IS MOST IMPORTANT TO KEEP THE WOUND COVERED AT ALL TIMES.  ___________________________________________________________________    Wound Location: Left  Great Toe Amputation Site    WOUND CLEANSING:Normal Saline    Della-Wound Topical Treatments:   Apply immediately around the wound: Normal Saline    PRIMARY DRESSING: Silver Alginate        SECONDARY DRESSING: Gauze & Roll Gauze    N/a not recommended at this time, pulses present     Dressing

## 2024-11-19 NOTE — PROGRESS NOTES
[place], [place] [address]  Telephone: [phone]      FAX [phone]  MONDAY - THURSDAY 8:30 am - 4:30 pm and Friday 8:30 am - 11:30 am      NAME:  Tony Boyle  YOB: 1955  MEDICAL RECORD NUMBER:  [id number]  DATE:  11/19/2024      Return Appointment:  [x] Return Appointment: With Dr. Jimbo Bruno  in  1  Week(s)             [] Nurse Visit for Wound Assessment:  [x] DME/Wound Dressing Supplies Provided by: [employer]    Please call them directly to reorder supplies when you run out)  [x] ECF or Home Healthcare:  [facility]  Your Home Care Agency is responsible to order your supplies.   [] Orders placed during your visit:      Continue IV antibiotics under the direction of Dr. Sheth      Important Reminders:   Please wash hands with soap and water before and after every dressing change.  If you smoke we ask that you refrain from smoking. Smoking inhibits wounds from healing.  When taking antibiotics take the entire prescription as ordered. Do not stop taking until medication is all gone unless otherwise instructed.   Do not get wounds wet in the bath or shower unless otherwise instructed by your physician. If your wound is on your foot or leg, you may purchase a cast bag. Please ask at your local pharmacy.   IF YOU ARE UNABLE TO OBTAIN WOUND SUPPLIES, CONTINUE TO USE THE SUPPLIES YOU HAVE AVAILABLE UNTIL YOU ARE ABLE TO REACH US. IT IS MOST IMPORTANT TO KEEP THE WOUND COVERED AT ALL TIMES.  ___________________________________________________________________    Wound Location: Left  Great Toe Amputation Site    WOUND CLEANSING:Normal Saline    Della-Wound Topical Treatments:   Apply immediately around the wound: Normal Saline    PRIMARY DRESSING: Silver Alginate        SECONDARY DRESSING: Gauze & Roll Gauze    N/a not recommended at this time, pulses present     Dressing Frequency:Every Other

## 2024-11-20 ENCOUNTER — OFFICE VISIT (OUTPATIENT)
Dept: INFECTIOUS DISEASES | Age: 69
End: 2024-11-20
Payer: MEDICARE

## 2024-11-20 ENCOUNTER — TELEPHONE (OUTPATIENT)
Dept: INFECTIOUS DISEASES | Age: 69
End: 2024-11-20

## 2024-11-20 VITALS
BODY MASS INDEX: 35.97 KG/M2 | HEIGHT: 72 IN | WEIGHT: 265.6 LBS | TEMPERATURE: 97.6 F | HEART RATE: 96 BPM | SYSTOLIC BLOOD PRESSURE: 124 MMHG | DIASTOLIC BLOOD PRESSURE: 82 MMHG | OXYGEN SATURATION: 96 %

## 2024-11-20 DIAGNOSIS — I10 ESSENTIAL HYPERTENSION: ICD-10-CM

## 2024-11-20 DIAGNOSIS — Z16.24 MULTIPLE DRUG RESISTANT ORGANISM (MDRO) CULTURE POSITIVE: ICD-10-CM

## 2024-11-20 DIAGNOSIS — B99.9 INFECTION REQUIRING CONTACT ISOLATION PRECAUTIONS: ICD-10-CM

## 2024-11-20 DIAGNOSIS — R79.82 CRP ELEVATED: ICD-10-CM

## 2024-11-20 DIAGNOSIS — R70.0 ELEVATED SED RATE: ICD-10-CM

## 2024-11-20 DIAGNOSIS — A49.8 INFECTION CAUSED BY ENTEROBACTER CLOACAE: ICD-10-CM

## 2024-11-20 DIAGNOSIS — L03.116 CELLULITIS OF LEFT FOOT: ICD-10-CM

## 2024-11-20 DIAGNOSIS — M86.9 TOE OSTEOMYELITIS, LEFT: Primary | ICD-10-CM

## 2024-11-20 DIAGNOSIS — E66.01 MORBID OBESITY DUE TO EXCESS CALORIES: ICD-10-CM

## 2024-11-20 DIAGNOSIS — Z79.2 RECEIVING INTRAVENOUS ANTIBIOTIC TREATMENT AS OUTPATIENT: ICD-10-CM

## 2024-11-20 PROCEDURE — 1036F TOBACCO NON-USER: CPT | Performed by: INTERNAL MEDICINE

## 2024-11-20 PROCEDURE — 1111F DSCHRG MED/CURRENT MED MERGE: CPT | Performed by: INTERNAL MEDICINE

## 2024-11-20 PROCEDURE — 99214 OFFICE O/P EST MOD 30 MIN: CPT | Performed by: INTERNAL MEDICINE

## 2024-11-20 PROCEDURE — 3017F COLORECTAL CA SCREEN DOC REV: CPT | Performed by: INTERNAL MEDICINE

## 2024-11-20 PROCEDURE — G8427 DOCREV CUR MEDS BY ELIG CLIN: HCPCS | Performed by: INTERNAL MEDICINE

## 2024-11-20 PROCEDURE — 1123F ACP DISCUSS/DSCN MKR DOCD: CPT | Performed by: INTERNAL MEDICINE

## 2024-11-20 PROCEDURE — 3074F SYST BP LT 130 MM HG: CPT | Performed by: INTERNAL MEDICINE

## 2024-11-20 PROCEDURE — G8482 FLU IMMUNIZE ORDER/ADMIN: HCPCS | Performed by: INTERNAL MEDICINE

## 2024-11-20 PROCEDURE — 3079F DIAST BP 80-89 MM HG: CPT | Performed by: INTERNAL MEDICINE

## 2024-11-20 PROCEDURE — G8417 CALC BMI ABV UP PARAM F/U: HCPCS | Performed by: INTERNAL MEDICINE

## 2024-11-20 NOTE — PROGRESS NOTES
seconds heart stop    TOE AMPUTATION Left 10/16/2024    PARTIAL HALLUX AMPUTATION LEFT FOOT performed by Jimbo Bruno DPM at [facility] OR    TOE AMPUTATION Left 10/30/2024    LEFT GREAT TOE AMPUTATION AT METATARSAL JOINT performed by Jimbo Bruno DPM at [facility] OR    TONSILLECTOMY      UPPER GASTROINTESTINAL ENDOSCOPY N/A 3/1/2024    ESOPHAGOGASTRODUODENOSCOPY DILATATION performed by Ambrocio Coffey MD at [facility] ENDOSCOPY       Current Medications:    Current Outpatient Medications   Medication Sig Dispense Refill    Continuous Glucose Sensor (DEXCOM G7 SENSOR) MISC 1 Device by Does not apply route every 10 days      amLODIPine (NORVASC) 5 MG tablet Take 1 tablet by mouth daily 30 tablet 0    tadalafil (CIALIS) 5 MG tablet Take 1 tablet by mouth daily      propranolol (INDERAL) 40 MG tablet Take 1 tablet by mouth 2 times daily      traZODone (DESYREL) 100 MG tablet Take 2 tablets by mouth nightly      lisinopril (PRINIVIL;ZESTRIL) 20 MG tablet Take 1 tablet by mouth daily      vitamin D (VITAMIN D3) 50 MCG (2000 UT) CAPS capsule Take 1 capsule by mouth daily      nitroGLYCERIN (NITRO-DUR) 0.1 MG/HR Place 1 patch onto the skin daily 30 patch 3    Dapagliflozin Pro-metFORMIN ER (XIGDUO XR)  MG TB24 Take 1 tablet by mouth daily      Insulin Degludec 200 UNIT/ML SOPN Inject 70 Units into the skin daily      Semaglutide, 1 MG/DOSE, 2 MG/1.5ML SOPN Inject 1 mg into the skin once a week      pantoprazole (PROTONIX) 40 MG tablet Take 1 tablet by mouth daily      clonazePAM (KLONOPIN) 1 MG tablet Take 1 tablet by mouth nightly.      desvenlafaxine succinate (PRISTIQ) 100 MG TB24 extended release tablet Take 1 tablet by mouth daily      meropenem (MERREM) infusion Infuse 1,000 mg intravenously in the morning and 1,000 mg at noon and 1,000 mg in the evening. Do all this for 15 days. Compound per protocol.. 1 each 0    DAPTOmycin (CUBICIN) infusion Infuse 750 mg intravenously every 24 hours for 15 days Compound per protocol. 12

## 2024-11-20 NOTE — TELEPHONE ENCOUNTER
[facility] pharmacist at [facility] verbalized understanding to extend IV abx until 12-5-24.

## 2024-11-25 LAB
ALBUMIN SERPL-MCNC: 4 G/DL (ref 3.4–5)
ALBUMIN/GLOB SERPL: 1.5 {RATIO} (ref 1.1–2.2)
ALP SERPL-CCNC: 124 U/L (ref 40–129)
ALT SERPL-CCNC: 10 U/L (ref 10–40)
ANION GAP SERPL CALCULATED.3IONS-SCNC: 11 MMOL/L (ref 3–16)
AST SERPL-CCNC: 19 U/L (ref 15–37)
BASOPHILS # BLD: 0 K/UL (ref 0–0.2)
BASOPHILS NFR BLD: 0.2 %
BILIRUB SERPL-MCNC: 0.4 MG/DL (ref 0–1)
BUN SERPL-MCNC: 18 MG/DL (ref 7–20)
CALCIUM SERPL-MCNC: 9.8 MG/DL (ref 8.3–10.6)
CHLORIDE SERPL-SCNC: 103 MMOL/L (ref 99–110)
CK SERPL-CCNC: 41 U/L (ref 39–308)
CO2 SERPL-SCNC: 26 MMOL/L (ref 21–32)
CREAT SERPL-MCNC: 0.8 MG/DL (ref 0.8–1.3)
CRP SERPL-MCNC: 8.6 MG/L (ref 0–5.1)
DEPRECATED RDW RBC AUTO: 15.4 % (ref 12.4–15.4)
EOSINOPHIL # BLD: 1.1 K/UL (ref 0–0.6)
EOSINOPHIL NFR BLD: 12.7 %
ERYTHROCYTE [SEDIMENTATION RATE] IN BLOOD BY WESTERGREN METHOD: 39 MM/HR (ref 0–20)
GFR SERPLBLD CREATININE-BSD FMLA CKD-EPI: >90 ML/MIN/{1.73_M2}
GLUCOSE SERPL-MCNC: 87 MG/DL (ref 70–99)
HCT VFR BLD AUTO: 42.3 % (ref 40.5–52.5)
HGB BLD-MCNC: 14.3 G/DL (ref 13.5–17.5)
LYMPHOCYTES # BLD: 2.8 K/UL (ref 1–5.1)
LYMPHOCYTES NFR BLD: 32.6 %
MCH RBC QN AUTO: 29.6 PG (ref 26–34)
MCHC RBC AUTO-ENTMCNC: 33.8 G/DL (ref 31–36)
MCV RBC AUTO: 87.7 FL (ref 80–100)
MONOCYTES # BLD: 0.7 K/UL (ref 0–1.3)
MONOCYTES NFR BLD: 7.7 %
NEUTROPHILS # BLD: 4 K/UL (ref 1.7–7.7)
NEUTROPHILS NFR BLD: 46.8 %
PLATELET # BLD AUTO: 235 K/UL (ref 135–450)
PMV BLD AUTO: 9 FL (ref 5–10.5)
POTASSIUM SERPL-SCNC: 4.7 MMOL/L (ref 3.5–5.1)
PROT SERPL-MCNC: 6.7 G/DL (ref 6.4–8.2)
RBC # BLD AUTO: 4.83 M/UL (ref 4.2–5.9)
SODIUM SERPL-SCNC: 140 MMOL/L (ref 136–145)
WBC # BLD AUTO: 8.5 K/UL (ref 4–11)

## 2024-11-26 ENCOUNTER — HOSPITAL ENCOUNTER (OUTPATIENT)
Dept: WOUND CARE | Age: 69
Discharge: HOME | End: 2024-11-26
Attending: PODIATRIST
Payer: MEDICARE

## 2024-11-26 VITALS
SYSTOLIC BLOOD PRESSURE: 142 MMHG | HEART RATE: 104 BPM | TEMPERATURE: 97.3 F | RESPIRATION RATE: 18 BRPM | DIASTOLIC BLOOD PRESSURE: 87 MMHG

## 2024-11-26 DIAGNOSIS — E11.621 DIABETIC ULCER OF LEFT GREAT TOE (HCC): Primary | ICD-10-CM

## 2024-11-26 DIAGNOSIS — L97.529 DIABETIC ULCER OF LEFT GREAT TOE (HCC): Primary | ICD-10-CM

## 2024-11-26 PROCEDURE — 11042 DBRDMT SUBQ TIS 1ST 20SQCM/<: CPT

## 2024-11-26 RX ORDER — CLOBETASOL PROPIONATE 0.5 MG/G
OINTMENT TOPICAL ONCE
OUTPATIENT
Start: 2024-11-26 | End: 2024-11-26

## 2024-11-26 RX ORDER — TRIAMCINOLONE ACETONIDE 1 MG/G
OINTMENT TOPICAL ONCE
OUTPATIENT
Start: 2024-11-26 | End: 2024-11-26

## 2024-11-26 RX ORDER — BACITRACIN ZINC AND POLYMYXIN B SULFATE 500; 1000 [USP'U]/G; [USP'U]/G
OINTMENT TOPICAL ONCE
OUTPATIENT
Start: 2024-11-26 | End: 2024-11-26

## 2024-11-26 RX ORDER — SODIUM CHLOR/HYPOCHLOROUS ACID 0.033 %
SOLUTION, IRRIGATION IRRIGATION ONCE
OUTPATIENT
Start: 2024-11-26 | End: 2024-11-26

## 2024-11-26 RX ORDER — LIDOCAINE 40 MG/G
CREAM TOPICAL ONCE
OUTPATIENT
Start: 2024-11-26 | End: 2024-11-26

## 2024-11-26 RX ORDER — GENTAMICIN SULFATE 1 MG/G
OINTMENT TOPICAL ONCE
OUTPATIENT
Start: 2024-11-26 | End: 2024-11-26

## 2024-11-26 RX ORDER — NEOMYCIN/BACITRACIN/POLYMYXINB 3.5-400-5K
OINTMENT (GRAM) TOPICAL ONCE
OUTPATIENT
Start: 2024-11-26 | End: 2024-11-26

## 2024-11-26 RX ORDER — LIDOCAINE HYDROCHLORIDE 20 MG/ML
JELLY TOPICAL ONCE
OUTPATIENT
Start: 2024-11-26 | End: 2024-11-26

## 2024-11-26 RX ORDER — BETAMETHASONE DIPROPIONATE 0.5 MG/G
CREAM TOPICAL ONCE
OUTPATIENT
Start: 2024-11-26 | End: 2024-11-26

## 2024-11-26 RX ORDER — LIDOCAINE 50 MG/G
OINTMENT TOPICAL ONCE
OUTPATIENT
Start: 2024-11-26 | End: 2024-11-26

## 2024-11-26 RX ORDER — GINSENG 100 MG
CAPSULE ORAL ONCE
OUTPATIENT
Start: 2024-11-26 | End: 2024-11-26

## 2024-11-26 RX ORDER — MUPIROCIN 20 MG/G
OINTMENT TOPICAL ONCE
OUTPATIENT
Start: 2024-11-26 | End: 2024-11-26

## 2024-11-26 RX ORDER — SILVER SULFADIAZINE 10 MG/G
CREAM TOPICAL ONCE
OUTPATIENT
Start: 2024-11-26 | End: 2024-11-26

## 2024-11-26 RX ORDER — LIDOCAINE HYDROCHLORIDE 40 MG/ML
SOLUTION TOPICAL ONCE
OUTPATIENT
Start: 2024-11-26 | End: 2024-11-26

## 2024-11-26 ASSESSMENT — PAIN SCALES - GENERAL: PAINLEVEL_OUTOF10: 0

## 2024-11-26 NOTE — PATIENT INSTRUCTIONS
_______________________________________________________________________________________________    : JOY     Electronically signed by Joy Cisneros RN on 11/26/2024 at 1:47 PM      Wound Care Center Information: Should you experience any significant changes in your wound(s) or have questions about your wound care, please contact the [facility] Wound Center at [phone].   MONDAY through THURSDAY 8:00 am - 4:30 pm.  Friday 8:00 am - 11:30 am.   The office is closed on all major holidays.   (Hours of operation are subject to change).     Please give us 24-48 business hours to return your call.  If you need help with your wounds and cannot wait until we are available, contact your Primary Care Physician or go to your preferred emergency room.      Physician Signature:_______________________    Date: ___________ Time:  ____________    Dr. Jimbo Bruno

## 2024-11-26 NOTE — PROGRESS NOTES
Surface Area Debrided:  2 sq cm     Estimated Blood Loss:  Minimal    Hemostasis Achieved:  by pressure    Procedural Pain:  1  / 10     Post Procedural Pain:  0 / 10     Response to treatment:  Well tolerated by patient.       Plan:   Debridement of 2 cm2 of wound to medial most portion of incision with use of Santyl to promote granulation    Treatment Note please see attached Discharge Instructions    Written patient dismissal instructions given to patient and signed by patient or POA.           Patient Instructions     [facility] Wound Care Physician Orders and Discharge Instructions  [address], [address]        [place], [place] [address]  Telephone: [phone]      FAX [phone]  MONDAY - THURSDAY 8:30 am - 4:30 pm and Friday 8:30 am - 11:30 am      NAME:  Tony Boyle  YOB: 1955  MEDICAL RECORD NUMBER:  [id number]  DATE:  11/26/2024      Return Appointment:  [x] Return Appointment: With Dr. Jimbo Bruno  in  1  Week(s)             [] Nurse Visit for Wound Assessment:  [x] DME/Wound Dressing Supplies Provided by: [employer]    Please call them directly to reorder supplies when you run out)  [x] ECF or Home Healthcare:  [facility]  Your Home Care Agency is responsible to order your supplies.   [] Orders placed during your visit:      Continue IV antibiotics under the direction of Dr. Sheth      Important Reminders:   Please wash hands with soap and water before and after every dressing change.  If you smoke we ask that you refrain from smoking. Smoking inhibits wounds from healing.  When taking antibiotics take the entire prescription as ordered. Do not stop taking until medication is all gone unless otherwise instructed.   Do not get wounds wet in the bath or shower unless otherwise instructed by your physician. If your wound is on your foot or leg, you may purchase a cast bag. Please ask at your local pharmacy.   IF YOU ARE UNABLE TO OBTAIN WOUND SUPPLIES,

## 2024-12-02 LAB
ALBUMIN SERPL-MCNC: 4 G/DL (ref 3.4–5)
ALBUMIN/GLOB SERPL: 1.6 {RATIO} (ref 1.1–2.2)
ALP SERPL-CCNC: 105 U/L (ref 40–129)
ALT SERPL-CCNC: 9 U/L (ref 10–40)
ANION GAP SERPL CALCULATED.3IONS-SCNC: 12 MMOL/L (ref 3–16)
AST SERPL-CCNC: 23 U/L (ref 15–37)
BASOPHILS # BLD: 0 K/UL (ref 0–0.2)
BASOPHILS NFR BLD: 0.3 %
BILIRUB SERPL-MCNC: 0.5 MG/DL (ref 0–1)
BUN SERPL-MCNC: 27 MG/DL (ref 7–20)
CALCIUM SERPL-MCNC: 9.7 MG/DL (ref 8.3–10.6)
CHLORIDE SERPL-SCNC: 103 MMOL/L (ref 99–110)
CK SERPL-CCNC: 151 U/L (ref 39–308)
CO2 SERPL-SCNC: 24 MMOL/L (ref 21–32)
CREAT SERPL-MCNC: 0.8 MG/DL (ref 0.8–1.3)
CRP SERPL-MCNC: 12.2 MG/L (ref 0–5.1)
DEPRECATED RDW RBC AUTO: 15.7 % (ref 12.4–15.4)
EOSINOPHIL # BLD: 0.3 K/UL (ref 0–0.6)
EOSINOPHIL NFR BLD: 4.1 %
ERYTHROCYTE [SEDIMENTATION RATE] IN BLOOD BY WESTERGREN METHOD: 29 MM/HR (ref 0–20)
GFR SERPLBLD CREATININE-BSD FMLA CKD-EPI: >90 ML/MIN/{1.73_M2}
GLUCOSE SERPL-MCNC: 144 MG/DL (ref 70–99)
HCT VFR BLD AUTO: 41.9 % (ref 40.5–52.5)
HGB BLD-MCNC: 14 G/DL (ref 13.5–17.5)
LYMPHOCYTES # BLD: 2.6 K/UL (ref 1–5.1)
LYMPHOCYTES NFR BLD: 36.1 %
MCH RBC QN AUTO: 29.5 PG (ref 26–34)
MCHC RBC AUTO-ENTMCNC: 33.4 G/DL (ref 31–36)
MCV RBC AUTO: 88.3 FL (ref 80–100)
MONOCYTES # BLD: 0.7 K/UL (ref 0–1.3)
MONOCYTES NFR BLD: 9.1 %
NEUTROPHILS # BLD: 3.6 K/UL (ref 1.7–7.7)
NEUTROPHILS NFR BLD: 50.4 %
PLATELET # BLD AUTO: 223 K/UL (ref 135–450)
PMV BLD AUTO: 9.3 FL (ref 5–10.5)
POTASSIUM SERPL-SCNC: 4.5 MMOL/L (ref 3.5–5.1)
PROT SERPL-MCNC: 6.5 G/DL (ref 6.4–8.2)
RBC # BLD AUTO: 4.75 M/UL (ref 4.2–5.9)
SODIUM SERPL-SCNC: 139 MMOL/L (ref 136–145)
WBC # BLD AUTO: 7.2 K/UL (ref 4–11)

## 2024-12-03 ENCOUNTER — TELEPHONE (OUTPATIENT)
Dept: INFECTIOUS DISEASES | Age: 69
End: 2024-12-03

## 2024-12-03 ENCOUNTER — HOSPITAL ENCOUNTER (OUTPATIENT)
Dept: WOUND CARE | Age: 69
Discharge: HOME | End: 2024-12-03
Attending: PODIATRIST
Payer: MEDICARE

## 2024-12-03 VITALS
DIASTOLIC BLOOD PRESSURE: 73 MMHG | TEMPERATURE: 97.2 F | SYSTOLIC BLOOD PRESSURE: 122 MMHG | HEART RATE: 81 BPM | RESPIRATION RATE: 18 BRPM

## 2024-12-03 DIAGNOSIS — E11.621 DIABETIC ULCER OF LEFT GREAT TOE (HCC): Primary | ICD-10-CM

## 2024-12-03 DIAGNOSIS — L97.529 DIABETIC ULCER OF LEFT GREAT TOE (HCC): Primary | ICD-10-CM

## 2024-12-03 PROCEDURE — 11042 DBRDMT SUBQ TIS 1ST 20SQCM/<: CPT

## 2024-12-03 RX ORDER — LIDOCAINE HYDROCHLORIDE 40 MG/ML
SOLUTION TOPICAL ONCE
OUTPATIENT
Start: 2024-12-03 | End: 2024-12-03

## 2024-12-03 RX ORDER — LIDOCAINE HYDROCHLORIDE 20 MG/ML
JELLY TOPICAL ONCE
OUTPATIENT
Start: 2024-12-03 | End: 2024-12-03

## 2024-12-03 RX ORDER — NEOMYCIN/BACITRACIN/POLYMYXINB 3.5-400-5K
OINTMENT (GRAM) TOPICAL ONCE
OUTPATIENT
Start: 2024-12-03 | End: 2024-12-03

## 2024-12-03 RX ORDER — MUPIROCIN 20 MG/G
OINTMENT TOPICAL ONCE
OUTPATIENT
Start: 2024-12-03 | End: 2024-12-03

## 2024-12-03 RX ORDER — GENTAMICIN SULFATE 1 MG/G
OINTMENT TOPICAL ONCE
OUTPATIENT
Start: 2024-12-03 | End: 2024-12-03

## 2024-12-03 RX ORDER — TRIAMCINOLONE ACETONIDE 1 MG/G
OINTMENT TOPICAL ONCE
OUTPATIENT
Start: 2024-12-03 | End: 2024-12-03

## 2024-12-03 RX ORDER — LIDOCAINE 40 MG/G
CREAM TOPICAL ONCE
OUTPATIENT
Start: 2024-12-03 | End: 2024-12-03

## 2024-12-03 RX ORDER — BACITRACIN ZINC AND POLYMYXIN B SULFATE 500; 1000 [USP'U]/G; [USP'U]/G
OINTMENT TOPICAL ONCE
OUTPATIENT
Start: 2024-12-03 | End: 2024-12-03

## 2024-12-03 RX ORDER — SODIUM CHLOR/HYPOCHLOROUS ACID 0.033 %
SOLUTION, IRRIGATION IRRIGATION ONCE
OUTPATIENT
Start: 2024-12-03 | End: 2024-12-03

## 2024-12-03 RX ORDER — BETAMETHASONE DIPROPIONATE 0.5 MG/G
CREAM TOPICAL ONCE
OUTPATIENT
Start: 2024-12-03 | End: 2024-12-03

## 2024-12-03 RX ORDER — LIDOCAINE 50 MG/G
OINTMENT TOPICAL ONCE
OUTPATIENT
Start: 2024-12-03 | End: 2024-12-03

## 2024-12-03 RX ORDER — SILVER SULFADIAZINE 10 MG/G
CREAM TOPICAL ONCE
OUTPATIENT
Start: 2024-12-03 | End: 2024-12-03

## 2024-12-03 RX ORDER — CLOBETASOL PROPIONATE 0.5 MG/G
OINTMENT TOPICAL ONCE
OUTPATIENT
Start: 2024-12-03 | End: 2024-12-03

## 2024-12-03 RX ORDER — GINSENG 100 MG
CAPSULE ORAL ONCE
OUTPATIENT
Start: 2024-12-03 | End: 2024-12-03

## 2024-12-03 ASSESSMENT — PAIN SCALES - GENERAL: PAINLEVEL_OUTOF10: 0

## 2024-12-03 NOTE — PROGRESS NOTES
[facility]   Progress Note and Procedure Note      Tony Boyle  MEDICAL RECORD NUMBER:  [id number]  AGE: 69 y.o.   GENDER: male  : 1955  EPISODE DATE:  12/3/2024    Subjective:     Chief Complaint   Patient presents with    Wound Check     Follow up for left foot wound.          HISTORY of PRESENT ILLNESS HPI     Tony Boyle is a 69 y.o. male who presents today for wound/ulcer evaluation.   History of Wound Context: LEFT hallux amputation site with continued dehiscence  Wound/Ulcer Pain Timing/Severity: none  Quality of pain: dull  Severity:  1 / 10   Modifying Factors: Pain worsens with debridement  Associated Signs/Symptoms: edema, drainage, and numbness    Ulcer Identification:  Ulcer Type: diabetic    Contributing Factors: edema, diabetes, and shear force    Acute Wound: Wound dehiscence    PAST MEDICAL HISTORY        Diagnosis Date    Depression     Diabetes mellitus (HCC)     GERD (gastroesophageal reflux disease)     Hyperlipidemia     Hypertension     LBBB (left bundle branch block)     SVT (supraventricular tachycardia) (HCC)        PAST SURGICAL HISTORY    Past Surgical History:   Procedure Laterality Date    COLONOSCOPY      COLONOSCOPY N/A 3/1/2024    COLONOSCOPY POLYPECTOMY SNARE/COLD BIOPSY performed by Ambrocio Coffey MD at [facility] ENDOSCOPY    ENDOSCOPY, COLON, DIAGNOSTIC      EYE SURGERY Right 2023    PHACOEMULSIFICATION WITH CLAREON PANOPTIX TORIC INTRAOCULAR LENS IMPLANT - RIGHT EYE performed by Luiz Mancuso MD at [facility]    EYE SURGERY Left 05/15/2023    PHACOEMULSIFICATION WITH CLAREON PANOPTIX TORIC INTRAOCULAR LENS IMPLANT - LEFT EYE performed by Luiz Mancuso MD at [facility]    INSERTABLE CARDIAC MONITOR      out    KNEE SURGERY Right     meninscus repair    PACEMAKER INSERTION      had 8 seconds heart stop    TOE AMPUTATION Left 10/16/2024    PARTIAL HALLUX AMPUTATION LEFT FOOT performed by Jimbo Bruno DPM at [facility] OR    MILLIE

## 2024-12-03 NOTE — TELEPHONE ENCOUNTER
Spoke with Dr. Bruno while pt was at his appt. Dr. Bruno states there is dehiscence along surgical line with serous fluid, but believes pt is ready to transition to oral abx. Pt due to end on 12-5-24. Ok to stop IV abx and pull PICC. Please advise.    Thanks.

## 2024-12-03 NOTE — PATIENT INSTRUCTIONS
[facility] Physician Orders and Discharge Instructions  [address], [address]        [place], [place] [address]  Telephone: [phone]      FAX [phone]  MONDAY - THURSDAY 8:30 am - 4:30 pm and Friday 8:30 am - 11:30 am      NAME:  Tony Boyle  YOB: 1955  MEDICAL RECORD NUMBER:  [id number]  DATE:  12/3/2024      Return Appointment:  [x] Return Appointment: With Dr. Jimbo Bruno  in  1  Week(s)             [] Nurse Visit for Wound Assessment:  [x] DME/Wound Dressing Supplies Provided by: [employer]    Please call them directly to reorder supplies when you run out)  [x] ECF or Home Healthcare:  [facility]  Your Home Care Agency is responsible to order your supplies.   [] Orders placed during your visit:      Continue IV antibiotics under the direction of Dr. Sheth      Important Reminders:   Please wash hands with soap and water before and after every dressing change.  If you smoke we ask that you refrain from smoking. Smoking inhibits wounds from healing.  When taking antibiotics take the entire prescription as ordered. Do not stop taking until medication is all gone unless otherwise instructed.   Do not get wounds wet in the bath or shower unless otherwise instructed by your physician. If your wound is on your foot or leg, you may purchase a cast bag. Please ask at your local pharmacy.   IF YOU ARE UNABLE TO OBTAIN WOUND SUPPLIES, CONTINUE TO USE THE SUPPLIES YOU HAVE AVAILABLE UNTIL YOU ARE ABLE TO REACH US. IT IS MOST IMPORTANT TO KEEP THE WOUND COVERED AT ALL TIMES.  ___________________________________________________________________    Wound Location: Left  Great Toe Amputation Site    WOUND CLEANSING:Normal Saline    Franklin-Wound Topical Treatments:   Apply immediately around the wound: Normal Saline    PRIMARY DRESSING: Betadine to franklin wound                   Medihoney        SECONDARY DRESSING: Gauze & Roll Gauze    N/a not recommended at this

## 2024-12-04 RX ORDER — LEVOFLOXACIN 500 MG/1
500 TABLET, FILM COATED ORAL DAILY
Qty: 14 TABLET | Refills: 0 | Status: SHIPPED | OUTPATIENT
Start: 2024-12-04 | End: 2024-12-18

## 2024-12-04 NOTE — TELEPHONE ENCOUNTER
I spoke to Dr.Loftus Dayana hightower to d/c IV abx as planned   Oral levofloxacin sent to his pharmacy for x 2 weeks  ESR,CRP improving

## 2024-12-05 ENCOUNTER — TELEPHONE (OUTPATIENT)
Dept: INFECTIOUS DISEASES | Age: 69
End: 2024-12-05

## 2024-12-05 NOTE — TELEPHONE ENCOUNTER
ALETAT End  Call made to pharmacy  Dany[facility]ward    Call made to A  [facility]   Call made to patient  yes  Will f/u in 1-2 days to verify line removal

## 2024-12-10 ENCOUNTER — HOSPITAL ENCOUNTER (OUTPATIENT)
Dept: WOUND CARE | Age: 69
Discharge: HOME | End: 2024-12-10
Attending: PODIATRIST
Payer: MEDICARE

## 2024-12-10 VITALS
SYSTOLIC BLOOD PRESSURE: 121 MMHG | HEART RATE: 85 BPM | DIASTOLIC BLOOD PRESSURE: 73 MMHG | TEMPERATURE: 97 F | RESPIRATION RATE: 20 BRPM

## 2024-12-10 DIAGNOSIS — E11.621 DIABETIC ULCER OF LEFT GREAT TOE (HCC): Primary | ICD-10-CM

## 2024-12-10 DIAGNOSIS — L97.529 DIABETIC ULCER OF LEFT GREAT TOE (HCC): Primary | ICD-10-CM

## 2024-12-10 PROCEDURE — 11042 DBRDMT SUBQ TIS 1ST 20SQCM/<: CPT

## 2024-12-10 RX ORDER — TRIAMCINOLONE ACETONIDE 1 MG/G
OINTMENT TOPICAL ONCE
OUTPATIENT
Start: 2024-12-10 | End: 2024-12-10

## 2024-12-10 RX ORDER — LIDOCAINE HYDROCHLORIDE 20 MG/ML
JELLY TOPICAL ONCE
OUTPATIENT
Start: 2024-12-10 | End: 2024-12-10

## 2024-12-10 RX ORDER — LIDOCAINE 40 MG/G
CREAM TOPICAL ONCE
OUTPATIENT
Start: 2024-12-10 | End: 2024-12-10

## 2024-12-10 RX ORDER — CLOBETASOL PROPIONATE 0.5 MG/G
OINTMENT TOPICAL ONCE
OUTPATIENT
Start: 2024-12-10 | End: 2024-12-10

## 2024-12-10 RX ORDER — SODIUM CHLOR/HYPOCHLOROUS ACID 0.033 %
SOLUTION, IRRIGATION IRRIGATION ONCE
OUTPATIENT
Start: 2024-12-10 | End: 2024-12-10

## 2024-12-10 RX ORDER — BETAMETHASONE DIPROPIONATE 0.5 MG/G
CREAM TOPICAL ONCE
OUTPATIENT
Start: 2024-12-10 | End: 2024-12-10

## 2024-12-10 RX ORDER — LIDOCAINE HYDROCHLORIDE 40 MG/ML
SOLUTION TOPICAL ONCE
OUTPATIENT
Start: 2024-12-10 | End: 2024-12-10

## 2024-12-10 RX ORDER — SILVER SULFADIAZINE 10 MG/G
CREAM TOPICAL ONCE
OUTPATIENT
Start: 2024-12-10 | End: 2024-12-10

## 2024-12-10 RX ORDER — GINSENG 100 MG
CAPSULE ORAL ONCE
OUTPATIENT
Start: 2024-12-10 | End: 2024-12-10

## 2024-12-10 RX ORDER — NEOMYCIN/BACITRACIN/POLYMYXINB 3.5-400-5K
OINTMENT (GRAM) TOPICAL ONCE
OUTPATIENT
Start: 2024-12-10 | End: 2024-12-10

## 2024-12-10 RX ORDER — GENTAMICIN SULFATE 1 MG/G
OINTMENT TOPICAL ONCE
OUTPATIENT
Start: 2024-12-10 | End: 2024-12-10

## 2024-12-10 RX ORDER — MUPIROCIN 20 MG/G
OINTMENT TOPICAL ONCE
OUTPATIENT
Start: 2024-12-10 | End: 2024-12-10

## 2024-12-10 RX ORDER — LIDOCAINE 50 MG/G
OINTMENT TOPICAL ONCE
OUTPATIENT
Start: 2024-12-10 | End: 2024-12-10

## 2024-12-10 RX ORDER — BACITRACIN ZINC AND POLYMYXIN B SULFATE 500; 1000 [USP'U]/G; [USP'U]/G
OINTMENT TOPICAL ONCE
OUTPATIENT
Start: 2024-12-10 | End: 2024-12-10

## 2024-12-10 ASSESSMENT — PAIN SCALES - GENERAL: PAINLEVEL_OUTOF10: 0

## 2024-12-10 NOTE — PROGRESS NOTES
Day  ___________________________________________________________________________________________    _____________________________________________________________________________________________   Pressure Relief and Off Loading: HEEL WEIGHT BEARING ONLY    Specialty equipment ordered:         []Wheelchair cushion            [] Specialty Bed/Mattress  ______________________________________________________________________________________________                       [x]Activity: Activity as tolerated    Surgical shoe on Left Foot                    [] Assistive Devices   None   Use as instructed by the provider     ____________________________________________________________________________________________     Dietary: Continue your diet as tolerated.  Important dietary reminders:  1. Increase Protein intake (i.e. Lean meats, fish, eggs, legumes, and yogurt).   2. Limit Sodium, Alcohol and Sugar.   3. If diabetic, follow a diabetic diet and check glucose prior to meals or as instructed by your physician.     May choose one of the Suggested Dietary Supplements below:   Ivan       30ml ProStat  EnsureEnlive   Ensure Max/Premier  ____________________________________________________________________________________________  Pain:   Please Note : some pain, drainage and/or bleeding might be expected after seeing the provider.     If you are still having pain after you go home:  For wounds on lower legs or arms, elevate the affected limb.  Use over-the-counter medications you would normally use for pain as permitted by your primary care doctor.    For Persistent Pain not relieved by the above interventions, please notify your family doctor.     Seek immediate medical care if:    You have symptoms of infection, such as:  Increased pain, swelling, warmth, or redness.  Red streaks leading from the area.  Pus draining from the area.  A fever.

## 2024-12-10 NOTE — PATIENT INSTRUCTIONS
recommended at this time, pulses present     Dressing Frequency:Every Other Day  ___________________________________________________________________________________________    _____________________________________________________________________________________________   Pressure Relief and Off Loading: HEEL WEIGHT BEARING ONLY    Specialty equipment ordered:         []Wheelchair cushion            [] Specialty Bed/Mattress  ______________________________________________________________________________________________                       [x]Activity: Activity as tolerated    Surgical shoe on Left Foot                    [] Assistive Devices   None   Use as instructed by the provider     ____________________________________________________________________________________________     Dietary: Continue your diet as tolerated.  Important dietary reminders:  1. Increase Protein intake (i.e. Lean meats, fish, eggs, legumes, and yogurt).   2. Limit Sodium, Alcohol and Sugar.   3. If diabetic, follow a diabetic diet and check glucose prior to meals or as instructed by your physician.     May choose one of the Suggested Dietary Supplements below:   Ivan       30ml ProStat  EnsureEnlive   Ensure Max/Premier  ____________________________________________________________________________________________  Pain:   Please Note : some pain, drainage and/or bleeding might be expected after seeing the provider.     If you are still having pain after you go home:  For wounds on lower legs or arms, elevate the affected limb.  Use over-the-counter medications you would normally use for pain as permitted by your primary care doctor.    For Persistent Pain not relieved by the above interventions, please notify your family doctor.     Seek immediate medical care if:    You have symptoms of infection, such as:  Increased pain, swelling, warmth, or redness.  Red streaks leading from the area.  Pus draining from the area.  A fever.

## 2025-01-08 ENCOUNTER — OFFICE VISIT (OUTPATIENT)
Dept: INFECTIOUS DISEASES | Age: 70
End: 2025-01-08
Payer: MEDICARE

## 2025-01-08 VITALS
HEART RATE: 72 BPM | HEIGHT: 73 IN | WEIGHT: 259.2 LBS | OXYGEN SATURATION: 94 % | DIASTOLIC BLOOD PRESSURE: 72 MMHG | SYSTOLIC BLOOD PRESSURE: 132 MMHG | TEMPERATURE: 96.3 F | BODY MASS INDEX: 34.35 KG/M2

## 2025-01-08 DIAGNOSIS — Z16.24 MULTIPLE DRUG RESISTANT ORGANISM (MDRO) CULTURE POSITIVE: ICD-10-CM

## 2025-01-08 DIAGNOSIS — L03.116 CELLULITIS OF LEFT FOOT: ICD-10-CM

## 2025-01-08 DIAGNOSIS — E11.628 TYPE 2 DIABETES MELLITUS WITH LEFT DIABETIC FOOT INFECTION (HCC): ICD-10-CM

## 2025-01-08 DIAGNOSIS — E66.01 MORBID OBESITY DUE TO EXCESS CALORIES: ICD-10-CM

## 2025-01-08 DIAGNOSIS — E11.628 DIABETIC INFECTION OF LEFT FOOT (HCC): ICD-10-CM

## 2025-01-08 DIAGNOSIS — M86.9 TOE OSTEOMYELITIS, LEFT: Primary | ICD-10-CM

## 2025-01-08 DIAGNOSIS — I70.248 ATHEROSCLEROSIS OF NATIVE ARTERIES OF LEFT LEG WITH ULCERATION OF OTHER PART OF LOWER LEG (HCC): ICD-10-CM

## 2025-01-08 DIAGNOSIS — R79.82 CRP ELEVATED: ICD-10-CM

## 2025-01-08 DIAGNOSIS — R70.0 ELEVATED SED RATE: ICD-10-CM

## 2025-01-08 DIAGNOSIS — M79.672 FOOT PAIN, LEFT: ICD-10-CM

## 2025-01-08 DIAGNOSIS — L08.9 DIABETIC INFECTION OF LEFT FOOT (HCC): ICD-10-CM

## 2025-01-08 DIAGNOSIS — L08.9 TYPE 2 DIABETES MELLITUS WITH LEFT DIABETIC FOOT INFECTION (HCC): ICD-10-CM

## 2025-01-08 PROCEDURE — 3078F DIAST BP <80 MM HG: CPT | Performed by: INTERNAL MEDICINE

## 2025-01-08 PROCEDURE — 3075F SYST BP GE 130 - 139MM HG: CPT | Performed by: INTERNAL MEDICINE

## 2025-01-08 PROCEDURE — 1159F MED LIST DOCD IN RCRD: CPT | Performed by: INTERNAL MEDICINE

## 2025-01-08 PROCEDURE — G8427 DOCREV CUR MEDS BY ELIG CLIN: HCPCS | Performed by: INTERNAL MEDICINE

## 2025-01-08 PROCEDURE — 2022F DILAT RTA XM EVC RTNOPTHY: CPT | Performed by: INTERNAL MEDICINE

## 2025-01-08 PROCEDURE — G8417 CALC BMI ABV UP PARAM F/U: HCPCS | Performed by: INTERNAL MEDICINE

## 2025-01-08 PROCEDURE — 1036F TOBACCO NON-USER: CPT | Performed by: INTERNAL MEDICINE

## 2025-01-08 PROCEDURE — 3017F COLORECTAL CA SCREEN DOC REV: CPT | Performed by: INTERNAL MEDICINE

## 2025-01-08 PROCEDURE — 3046F HEMOGLOBIN A1C LEVEL >9.0%: CPT | Performed by: INTERNAL MEDICINE

## 2025-01-08 PROCEDURE — 99214 OFFICE O/P EST MOD 30 MIN: CPT | Performed by: INTERNAL MEDICINE

## 2025-01-08 PROCEDURE — 1123F ACP DISCUSS/DSCN MKR DOCD: CPT | Performed by: INTERNAL MEDICINE

## 2025-01-08 NOTE — PROGRESS NOTES
Infectious Diseases Outpatient Follow-up Note       Primary Care Physician:  Hebert Bolivar MD       History Obtained From:   Patient, EPIC    CHIEF COMPLAINT / ID problem:    Chief Complaint   Patient presents with    Follow-up     Toe OM -nk       HISTORY OF PRESENT ILLNESS / Interval history:  Here for follow-up on her left great toe infection and osteomyelitis status post  revision surgery patient had a complicated course for healing unfortunately has to taken back for surgery and revision secondary to ongoing cellulitis and nonhealing wound of Left Great toe resection site Bone path margins clean from 10/30/24.  History of MDRO infection requiring IV antibiotic.  Patient finished IV meropenem and IV daptomycin and PICC line removed.  Was followed up in the wound care  now seeing  in his private office, left great toe resection site still has an open wound edema significantly improved not much drainage he does have neuropathy affecting the wound healing currently off all antibiotics he is doing local wound care last lab work indicate ESR 29 and CRP was down to 12.2.      Past Medical History:    Past Medical History:   Diagnosis Date    Depression     Diabetes mellitus (HCC)     GERD (gastroesophageal reflux disease)     Hyperlipidemia     Hypertension     LBBB (left bundle branch block)     SVT (supraventricular tachycardia) (HCC)        Past Surgical History:    Past Surgical History:   Procedure Laterality Date    COLONOSCOPY      COLONOSCOPY N/A 3/1/2024    COLONOSCOPY POLYPECTOMY SNARE/COLD BIOPSY performed by Ambrocio Coffey MD at [facility] ENDOSCOPY    ENDOSCOPY, COLON, DIAGNOSTIC      EYE SURGERY Right 05/01/2023    PHACOEMULSIFICATION WITH CLAREON PANOPTIX TORIC INTRAOCULAR LENS IMPLANT - RIGHT EYE performed by Luiz Mancuso MD at [facility]    EYE SURGERY Left 05/15/2023    PHACOEMULSIFICATION WITH CLAREON PANOPTIX TORIC INTRAOCULAR LENS IMPLANT - LEFT EYE performed by Luiz Mancuso MD at

## 2025-01-22 ENCOUNTER — HOSPITAL ENCOUNTER (OUTPATIENT)
Dept: VASCULAR LAB | Age: 70
Discharge: HOME | End: 2025-01-24
Attending: INTERNAL MEDICINE
Payer: MEDICARE

## 2025-01-22 ENCOUNTER — TELEPHONE (OUTPATIENT)
Dept: INFECTIOUS DISEASES | Age: 70
End: 2025-01-22

## 2025-01-22 DIAGNOSIS — M79.672 FOOT PAIN, LEFT: ICD-10-CM

## 2025-01-22 DIAGNOSIS — L08.9 DIABETIC INFECTION OF LEFT FOOT (HCC): ICD-10-CM

## 2025-01-22 DIAGNOSIS — E11.628 DIABETIC INFECTION OF LEFT FOOT (HCC): ICD-10-CM

## 2025-01-22 DIAGNOSIS — I70.248 ATHEROSCLEROSIS OF NATIVE ARTERIES OF LEFT LEG WITH ULCERATION OF OTHER PART OF LOWER LEG (HCC): ICD-10-CM

## 2025-01-22 DIAGNOSIS — M86.9 TOE OSTEOMYELITIS, LEFT: ICD-10-CM

## 2025-01-22 LAB
VAS LEFT ABI: 1.41
VAS LEFT ARM BP: 142 MMHG
VAS LEFT ATA MID PSV: 102.9 CM/S
VAS LEFT CFA DIST PSV: 120.5 CM/S
VAS LEFT CFA PROX PSV: 149 CM/S
VAS LEFT DORSALIS PEDIS BP: 200 MMHG
VAS LEFT PERONEAL MID PSV: 118.3 CM/S
VAS LEFT PFA PROX PSV: 76 CM/S
VAS LEFT POP A DIST PSV: 122.7 CM/S
VAS LEFT POP A PROX PSV: 155.6 CM/S
VAS LEFT POP A PROX VEL RATIO: 1.37
VAS LEFT PTA BP: 180 MMHG
VAS LEFT PTA MID PSV: 133.7 CM/S
VAS LEFT SFA DIST PSV: 113.9 CM/S
VAS LEFT SFA DIST VEL RATIO: 0.79
VAS LEFT SFA MID PSV: 144.6 CM/S
VAS LEFT SFA MID VEL RATIO: 1.12
VAS LEFT SFA PROX PSV: 129.3 CM/S
VAS LEFT SFA PROX VEL RATIO: 0.87
VAS RIGHT ABI: 1.2
VAS RIGHT ARM BP: 142 MMHG
VAS RIGHT ATA MID PSV: 75.6 CM/S
VAS RIGHT CFA DIST PSV: 116.9 CM/S
VAS RIGHT CFA PROX PSV: 134.4 CM/S
VAS RIGHT DORSALIS PEDIS BP: 168 MMHG
VAS RIGHT PERONEAL MID PSV: 65.8 CM/S
VAS RIGHT PFA PROX PSV: 86 CM/S
VAS RIGHT POP A DIST PSV: 91.2 CM/S
VAS RIGHT POP A PROX PSV: 99.3 CM/S
VAS RIGHT POP A PROX VEL RATIO: 0.87
VAS RIGHT PTA BP: 170 MMHG
VAS RIGHT PTA MID PSV: 118.3 CM/S
VAS RIGHT SFA DIST PSV: 114.7 CM/S
VAS RIGHT SFA DIST VEL RATIO: 0.93
VAS RIGHT SFA MID PSV: 123.5 CM/S
VAS RIGHT SFA MID VEL RATIO: 1.2
VAS RIGHT SFA PROX PSV: 101.5 CM/S
VAS RIGHT SFA PROX VEL RATIO: 0.8

## 2025-01-22 PROCEDURE — 93925 LOWER EXTREMITY STUDY: CPT | Performed by: SURGERY

## 2025-01-22 PROCEDURE — 93925 LOWER EXTREMITY STUDY: CPT

## 2025-07-28 ENCOUNTER — HOSPITAL ENCOUNTER (INPATIENT)
Age: 70
LOS: 4 days | Discharge: HOME HEALTH | End: 2025-08-01
Attending: HOSPITALIST | Admitting: HOSPITALIST
Payer: MEDICARE

## 2025-07-28 ENCOUNTER — ANESTHESIA EVENT (OUTPATIENT)
Dept: OPERATING ROOM | Age: 70
End: 2025-07-28
Payer: MEDICARE

## 2025-07-28 DIAGNOSIS — M86.172 OSTEOMYELITIS OF ANKLE OR FOOT, LEFT, ACUTE (HCC): ICD-10-CM

## 2025-07-28 PROBLEM — M86.9 OSTEOMYELITIS (HCC): Status: ACTIVE | Noted: 2025-07-28

## 2025-07-28 LAB
ANION GAP SERPL CALCULATED.3IONS-SCNC: 10 MMOL/L (ref 3–16)
BASOPHILS # BLD: 0.1 K/UL (ref 0–0.2)
BASOPHILS NFR BLD: 1.5 %
BUN SERPL-MCNC: 15 MG/DL (ref 7–20)
CALCIUM SERPL-MCNC: 9.2 MG/DL (ref 8.3–10.6)
CHLORIDE SERPL-SCNC: 105 MMOL/L (ref 99–110)
CO2 SERPL-SCNC: 27 MMOL/L (ref 21–32)
CREAT SERPL-MCNC: 0.7 MG/DL (ref 0.8–1.3)
CRP SERPL-MCNC: 71.5 MG/L (ref 0–5.1)
DEPRECATED RDW RBC AUTO: 16.5 % (ref 12.4–15.4)
EOSINOPHIL # BLD: 0.1 K/UL (ref 0–0.6)
EOSINOPHIL NFR BLD: 1.5 %
GFR SERPLBLD CREATININE-BSD FMLA CKD-EPI: >90 ML/MIN/{1.73_M2}
GLUCOSE BLD-MCNC: 116 MG/DL (ref 70–99)
GLUCOSE BLD-MCNC: 120 MG/DL (ref 70–99)
GLUCOSE BLD-MCNC: 87 MG/DL (ref 70–99)
GLUCOSE SERPL-MCNC: 70 MG/DL (ref 70–99)
HCT VFR BLD AUTO: 39.2 % (ref 40.5–52.5)
HGB BLD-MCNC: 13.3 G/DL (ref 13.5–17.5)
LYMPHOCYTES # BLD: 1.8 K/UL (ref 1–5.1)
LYMPHOCYTES NFR BLD: 23.4 %
MCH RBC QN AUTO: 29.3 PG (ref 26–34)
MCHC RBC AUTO-ENTMCNC: 34 G/DL (ref 31–36)
MCV RBC AUTO: 85.9 FL (ref 80–100)
MONOCYTES # BLD: 0.9 K/UL (ref 0–1.3)
MONOCYTES NFR BLD: 12 %
NEUTROPHILS # BLD: 4.6 K/UL (ref 1.7–7.7)
NEUTROPHILS NFR BLD: 61.6 %
PERFORMED ON: ABNORMAL
PERFORMED ON: ABNORMAL
PERFORMED ON: NORMAL
PLATELET # BLD AUTO: 241 K/UL (ref 135–450)
PMV BLD AUTO: 7.9 FL (ref 5–10.5)
POTASSIUM SERPL-SCNC: 4 MMOL/L (ref 3.5–5.1)
PROCALCITONIN SERPL IA-MCNC: 0.07 NG/ML (ref 0–0.15)
RBC # BLD AUTO: 4.56 M/UL (ref 4.2–5.9)
SODIUM SERPL-SCNC: 142 MMOL/L (ref 136–145)
WBC # BLD AUTO: 7.5 K/UL (ref 4–11)

## 2025-07-28 PROCEDURE — 86140 C-REACTIVE PROTEIN: CPT

## 2025-07-28 PROCEDURE — 2580000003 HC RX 258: Performed by: HOSPITALIST

## 2025-07-28 PROCEDURE — 85025 COMPLETE CBC W/AUTO DIFF WBC: CPT

## 2025-07-28 PROCEDURE — 84145 PROCALCITONIN (PCT): CPT

## 2025-07-28 PROCEDURE — 6360000002 HC RX W HCPCS: Performed by: HOSPITALIST

## 2025-07-28 PROCEDURE — 2500000003 HC RX 250 WO HCPCS: Performed by: HOSPITALIST

## 2025-07-28 PROCEDURE — 87641 MR-STAPH DNA AMP PROBE: CPT

## 2025-07-28 PROCEDURE — 36415 COLL VENOUS BLD VENIPUNCTURE: CPT

## 2025-07-28 PROCEDURE — 6370000000 HC RX 637 (ALT 250 FOR IP): Performed by: HOSPITALIST

## 2025-07-28 PROCEDURE — 80048 BASIC METABOLIC PNL TOTAL CA: CPT

## 2025-07-28 PROCEDURE — 1200000000 HC SEMI PRIVATE

## 2025-07-28 RX ORDER — GLUCAGON 1 MG/ML
1 KIT INJECTION PRN
Status: DISCONTINUED | OUTPATIENT
Start: 2025-07-28 | End: 2025-08-01 | Stop reason: HOSPADM

## 2025-07-28 RX ORDER — POTASSIUM CHLORIDE 7.45 MG/ML
10 INJECTION INTRAVENOUS PRN
Status: DISCONTINUED | OUTPATIENT
Start: 2025-07-28 | End: 2025-08-01 | Stop reason: HOSPADM

## 2025-07-28 RX ORDER — ENOXAPARIN SODIUM 100 MG/ML
40 INJECTION SUBCUTANEOUS DAILY
Status: DISCONTINUED | OUTPATIENT
Start: 2025-07-28 | End: 2025-08-01 | Stop reason: DRUGHIGH

## 2025-07-28 RX ORDER — MAGNESIUM SULFATE IN WATER 40 MG/ML
2000 INJECTION, SOLUTION INTRAVENOUS PRN
Status: DISCONTINUED | OUTPATIENT
Start: 2025-07-28 | End: 2025-08-01 | Stop reason: HOSPADM

## 2025-07-28 RX ORDER — VENLAFAXINE HYDROCHLORIDE 75 MG/1
75 CAPSULE, EXTENDED RELEASE ORAL
Status: DISCONTINUED | OUTPATIENT
Start: 2025-07-29 | End: 2025-08-01 | Stop reason: HOSPADM

## 2025-07-28 RX ORDER — INSULIN GLARGINE 100 [IU]/ML
50 INJECTION, SOLUTION SUBCUTANEOUS NIGHTLY
Status: DISCONTINUED | OUTPATIENT
Start: 2025-07-28 | End: 2025-07-30

## 2025-07-28 RX ORDER — ACETAMINOPHEN 325 MG/1
650 TABLET ORAL EVERY 6 HOURS PRN
Status: DISCONTINUED | OUTPATIENT
Start: 2025-07-28 | End: 2025-08-01 | Stop reason: HOSPADM

## 2025-07-28 RX ORDER — TRAZODONE HYDROCHLORIDE 100 MG/1
200 TABLET ORAL NIGHTLY
Status: DISCONTINUED | OUTPATIENT
Start: 2025-07-28 | End: 2025-08-01 | Stop reason: HOSPADM

## 2025-07-28 RX ORDER — KETOROLAC TROMETHAMINE 15 MG/ML
15 INJECTION, SOLUTION INTRAMUSCULAR; INTRAVENOUS EVERY 6 HOURS PRN
Status: DISCONTINUED | OUTPATIENT
Start: 2025-07-28 | End: 2025-08-01 | Stop reason: HOSPADM

## 2025-07-28 RX ORDER — INSULIN LISPRO 100 [IU]/ML
0-8 INJECTION, SOLUTION INTRAVENOUS; SUBCUTANEOUS
Status: DISCONTINUED | OUTPATIENT
Start: 2025-07-28 | End: 2025-08-01 | Stop reason: HOSPADM

## 2025-07-28 RX ORDER — SODIUM CHLORIDE 0.9 % (FLUSH) 0.9 %
5-40 SYRINGE (ML) INJECTION EVERY 12 HOURS SCHEDULED
Status: DISCONTINUED | OUTPATIENT
Start: 2025-07-28 | End: 2025-08-01 | Stop reason: HOSPADM

## 2025-07-28 RX ORDER — POTASSIUM CHLORIDE 1500 MG/1
40 TABLET, EXTENDED RELEASE ORAL PRN
Status: DISCONTINUED | OUTPATIENT
Start: 2025-07-28 | End: 2025-08-01 | Stop reason: HOSPADM

## 2025-07-28 RX ORDER — ONDANSETRON 2 MG/ML
4 INJECTION INTRAMUSCULAR; INTRAVENOUS EVERY 6 HOURS PRN
Status: DISCONTINUED | OUTPATIENT
Start: 2025-07-28 | End: 2025-08-01 | Stop reason: HOSPADM

## 2025-07-28 RX ORDER — DEXTROSE MONOHYDRATE 100 MG/ML
INJECTION, SOLUTION INTRAVENOUS CONTINUOUS PRN
Status: DISCONTINUED | OUTPATIENT
Start: 2025-07-28 | End: 2025-08-01 | Stop reason: HOSPADM

## 2025-07-28 RX ORDER — LISINOPRIL 20 MG/1
20 TABLET ORAL DAILY
Status: DISCONTINUED | OUTPATIENT
Start: 2025-07-28 | End: 2025-08-01 | Stop reason: HOSPADM

## 2025-07-28 RX ORDER — SODIUM CHLORIDE 0.9 % (FLUSH) 0.9 %
5-40 SYRINGE (ML) INJECTION PRN
Status: DISCONTINUED | OUTPATIENT
Start: 2025-07-28 | End: 2025-08-01 | Stop reason: HOSPADM

## 2025-07-28 RX ORDER — CLONAZEPAM 1 MG/1
1 TABLET ORAL NIGHTLY
Status: DISCONTINUED | OUTPATIENT
Start: 2025-07-28 | End: 2025-08-01 | Stop reason: HOSPADM

## 2025-07-28 RX ORDER — ONDANSETRON 4 MG/1
4 TABLET, ORALLY DISINTEGRATING ORAL EVERY 8 HOURS PRN
Status: DISCONTINUED | OUTPATIENT
Start: 2025-07-28 | End: 2025-08-01 | Stop reason: HOSPADM

## 2025-07-28 RX ORDER — ACETAMINOPHEN 650 MG/1
650 SUPPOSITORY RECTAL EVERY 6 HOURS PRN
Status: DISCONTINUED | OUTPATIENT
Start: 2025-07-28 | End: 2025-08-01 | Stop reason: HOSPADM

## 2025-07-28 RX ORDER — ROSUVASTATIN CALCIUM 20 MG/1
20 TABLET, COATED ORAL DAILY
COMMUNITY

## 2025-07-28 RX ORDER — ROSUVASTATIN CALCIUM 20 MG/1
20 TABLET, COATED ORAL DAILY
Status: DISCONTINUED | OUTPATIENT
Start: 2025-07-28 | End: 2025-08-01 | Stop reason: HOSPADM

## 2025-07-28 RX ORDER — POLYETHYLENE GLYCOL 3350 17 G/17G
17 POWDER, FOR SOLUTION ORAL DAILY PRN
Status: DISCONTINUED | OUTPATIENT
Start: 2025-07-28 | End: 2025-08-01 | Stop reason: HOSPADM

## 2025-07-28 RX ORDER — SODIUM CHLORIDE 9 MG/ML
INJECTION, SOLUTION INTRAVENOUS PRN
Status: DISCONTINUED | OUTPATIENT
Start: 2025-07-28 | End: 2025-08-01

## 2025-07-28 RX ORDER — OXYCODONE AND ACETAMINOPHEN 5; 325 MG/1; MG/1
1 TABLET ORAL EVERY 4 HOURS PRN
Refills: 0 | Status: DISCONTINUED | OUTPATIENT
Start: 2025-07-28 | End: 2025-08-01 | Stop reason: HOSPADM

## 2025-07-28 RX ORDER — AMLODIPINE BESYLATE 5 MG/1
5 TABLET ORAL DAILY
Status: DISCONTINUED | OUTPATIENT
Start: 2025-07-28 | End: 2025-08-01 | Stop reason: HOSPADM

## 2025-07-28 RX ORDER — PROPRANOLOL HYDROCHLORIDE 40 MG/1
40 TABLET ORAL 2 TIMES DAILY
Status: DISCONTINUED | OUTPATIENT
Start: 2025-07-28 | End: 2025-08-01 | Stop reason: HOSPADM

## 2025-07-28 RX ADMIN — ACETAMINOPHEN 650 MG: 325 TABLET ORAL at 20:52

## 2025-07-28 RX ADMIN — VANCOMYCIN HYDROCHLORIDE 1500 MG: 1.5 INJECTION, POWDER, LYOPHILIZED, FOR SOLUTION INTRAVENOUS at 13:12

## 2025-07-28 RX ADMIN — AMLODIPINE BESYLATE 5 MG: 5 TABLET ORAL at 15:46

## 2025-07-28 RX ADMIN — Medication 10 ML: at 20:55

## 2025-07-28 RX ADMIN — CEFEPIME 2000 MG: 2 INJECTION, POWDER, FOR SOLUTION INTRAVENOUS at 21:02

## 2025-07-28 RX ADMIN — CLONAZEPAM 1 MG: 1 TABLET ORAL at 20:52

## 2025-07-28 RX ADMIN — ROSUVASTATIN CALCIUM 20 MG: 20 TABLET, FILM COATED ORAL at 15:46

## 2025-07-28 RX ADMIN — CEFEPIME 2000 MG: 2 INJECTION, POWDER, FOR SOLUTION INTRAVENOUS at 13:10

## 2025-07-28 RX ADMIN — TRAZODONE HYDROCHLORIDE 200 MG: 100 TABLET ORAL at 20:52

## 2025-07-28 RX ADMIN — PROPRANOLOL HYDROCHLORIDE 40 MG: 40 TABLET ORAL at 20:52

## 2025-07-28 RX ADMIN — LISINOPRIL 20 MG: 20 TABLET ORAL at 15:46

## 2025-07-28 RX ADMIN — ENOXAPARIN SODIUM 40 MG: 100 INJECTION SUBCUTANEOUS at 15:45

## 2025-07-28 ASSESSMENT — ENCOUNTER SYMPTOMS: SHORTNESS OF BREATH: 0

## 2025-07-28 ASSESSMENT — LIFESTYLE VARIABLES: SMOKING_STATUS: 0

## 2025-07-28 NOTE — H&P
V2.0  History and Physical      Name:  Tony Boyle /Age/Sex: 1955  (69 y.o. male)   MRN & CSN:  [id number] & [id number] Encounter Date/Time: 2025 1:06 PM EDT   Location:  A3H-0288/4272-01 PCP: Hebert Bolivar MD       Hospital Day: 1  History from:   patient  History of Present Illness:   Chief Complaint: Gangrenous toe    Tony Boyle is a 69 y.o. male with a past medical history significant for diabetes, peripheral neuropathy, hypertension, has had amputation of his left great toe, that has healed well, that was undertaken late last year, several weeks ago it seems patient developed an infection of his third toe, the distal aspect but slowly got worse, seen by Dr. Bruno today in the office and he was concerned about osteomyelitis with deep wound probing to the bone, he was sent to the hospital for direct admission, the patient did not look toxic, no labs available, denies any fevers or chills,     Review of Systems:    Pertinent positives and negatives discussed in HPI   Objective:   No intake or output data in the 24 hours ending 25 1306   Vitals:   Vitals:    25 1149 25 1151 25 1215   BP: (!) 167/52 (!) 146/71    Pulse: 83 87    Resp: 18     Temp: 97.9 °F (36.6 °C)     TempSrc: Oral     SpO2: 99%     Weight:   115.8 kg (255 lb 4.7 oz)   Height:   1.829 m (6')       Past Medical History:     PMHx   Past Medical History:   Diagnosis Date    Depression     Diabetes mellitus (HCC)     GERD (gastroesophageal reflux disease)     Hyperlipidemia     Hypertension     LBBB (left bundle branch block)     SVT (supraventricular tachycardia)      PSHX:  has a past surgical history that includes Tonsillectomy; knee surgery (Right); Pacemaker insertion; Eye surgery (Right, 2023); Insertable Cardiac Monitor; Eye surgery (Left, 05/15/2023); Colonoscopy; Endoscopy, colon, diagnostic; Upper gastrointestinal endoscopy (N/A, 3/1/2024); Colonoscopy (N/A, 3/1/2024); Toe    Dapagliflozin Pro-metFORMIN ER (XIGDUO XR)  MG TB24 Take 1 tablet by mouth daily 5/20/24  Yes Juana Hunt MD   Insulin Degludec 200 UNIT/ML SOPN Inject 70 Units into the skin daily   Yes Juana Hunt MD   clonazePAM (KLONOPIN) 1 MG tablet Take 1 tablet by mouth nightly. 12/15/17  Yes Juana Hunt MD   desvenlafaxine succinate (PRISTIQ) 100 MG TB24 extended release tablet Take 1 tablet by mouth daily   Yes Juana Hunt MD   Continuous Glucose Sensor (DEXCOM G7 SENSOR) MISC 1 Device by Does not apply route every 10 days    Juana Hunt MD   tadalafil (CIALIS) 5 MG tablet Take 1 tablet by mouth daily    Juana Hunt MD   vitamin D (VITAMIN D3) 50 MCG (2000 UT) CAPS capsule Take 1 capsule by mouth daily    Juana Hunt MD   nitroGLYCERIN (NITRO-DUR) 0.1 MG/HR Place 1 patch onto the skin daily 10/8/24   Jimbo Bruno DPM   Semaglutide, 1 MG/DOSE, 2 MG/1.5ML SOPN Inject 1 mg into the skin once a week  Patient not taking: Reported on 7/28/2025    Juana Hunt MD   pantoprazole (PROTONIX) 40 MG tablet Take 1 tablet by mouth daily  Patient not taking: Reported on 7/28/2025    Juana Hunt MD       Physical Exam:      General: Awake, alert and oriented, NAD  Eyes: EOMI  ENT: neck supple  Cardiovascular: S1S2 present, regular rate and rhythm, no murmurs  Respiratory: Clear to auscultation bilaterally  Gastrointestinal: Soft, non tender, + bowel sounds   Genitourinary: no suprapubic tenderness  Left foot, great toe is missing, the third toe, the distal aspect is necrotic, did not probe any surrounding area but there is seem to be exudate, the forefoot was warm and erythematous    Medications:   Medications:    sodium chloride flush  5-40 mL IntraVENous 2 times per day    enoxaparin  40 mg SubCUTAneous Daily    cefepime  2,000 mg IntraVENous Q8H    insulin lispro  0-8 Units SubCUTAneous 4x Daily AC & HS    vancomycin (VANCOCIN)

## 2025-07-28 NOTE — CONSULTS
Department of Podiatry Consult Note  Jimbo Bruno DPM Attending     Reason for Consult:  Osteomyelitis of LEFT 3rd digits  Requesting Physician:  Osman Lo MD    CHIEF COMPLAINT:  Dark eschar of gangrene to LEFT 3rd digit    HISTORY OF PRESENT ILLNESS:                The patient is a 69 y.o. male with significant past medical history of Diabetes with peripheral neuropathy and previous amputation status who is consulted for newer onset of wound of LEFT 3rd digit during his recovery of hallux amputation same foot. Patient had radiographs at last appointment with full intact cortices, but now presents today with exposed distal phalanx of his LEFT 3rd digit    Past Medical History:        Diagnosis Date    Depression     Diabetes mellitus (HCC)     GERD (gastroesophageal reflux disease)     Hyperlipidemia     Hypertension     LBBB (left bundle branch block)     SVT (supraventricular tachycardia)      Past Surgical History:        Procedure Laterality Date    COLONOSCOPY      COLONOSCOPY N/A 3/1/2024    COLONOSCOPY POLYPECTOMY SNARE/COLD BIOPSY performed by Ambrocio Coffey MD at [facility] ENDOSCOPY    ENDOSCOPY, COLON, DIAGNOSTIC      EYE SURGERY Right 05/01/2023    PHACOEMULSIFICATION WITH CLAREON PANOPTIX TORIC INTRAOCULAR LENS IMPLANT - RIGHT EYE performed by Luiz Mancuso MD at [facility]    EYE SURGERY Left 05/15/2023    PHACOEMULSIFICATION WITH CLAREON PANOPTIX TORIC INTRAOCULAR LENS IMPLANT - LEFT EYE performed by Luiz Mancuso MD at [facility]    INSERTABLE CARDIAC MONITOR      out    KNEE SURGERY Right     meninscus repair    PACEMAKER INSERTION      had 8 seconds heart stop    TOE AMPUTATION Left 10/16/2024    PARTIAL HALLUX AMPUTATION LEFT FOOT performed by Jimbo Bruno DPM at [facility] OR    TOE AMPUTATION Left 10/30/2024    LEFT GREAT TOE AMPUTATION AT METATARSAL JOINT performed by Jimbo Bruno DPM at [facility] OR    TONSILLECTOMY      UPPER GASTROINTESTINAL ENDOSCOPY N/A 3/1/2024     ESOPHAGOGASTRODUODENOSCOPY DILATATION performed by Ambrocio Coffey MD at [facility] ENDOSCOPY     Current Medications:    Current Facility-Administered Medications: sodium chloride flush 0.9 % injection 5-40 mL, 5-40 mL, IntraVENous, 2 times per day  sodium chloride flush 0.9 % injection 5-40 mL, 5-40 mL, IntraVENous, PRN  0.9 % sodium chloride infusion, , IntraVENous, PRN  potassium chloride (KLOR-CON M) extended release tablet 40 mEq, 40 mEq, Oral, PRN **OR** potassium bicarb-citric acid (EFFER-K) effervescent tablet 40 mEq, 40 mEq, Oral, PRN **OR** potassium chloride 10 mEq/100 mL IVPB (Peripheral Line), 10 mEq, IntraVENous, PRN  magnesium sulfate 2000 mg in 50 mL IVPB premix, 2,000 mg, IntraVENous, PRN  enoxaparin (LOVENOX) injection 40 mg, 40 mg, SubCUTAneous, Daily  ondansetron (ZOFRAN-ODT) disintegrating tablet 4 mg, 4 mg, Oral, Q8H PRN **OR** ondansetron (ZOFRAN) injection 4 mg, 4 mg, IntraVENous, Q6H PRN  polyethylene glycol (GLYCOLAX) packet 17 g, 17 g, Oral, Daily PRN  acetaminophen (TYLENOL) tablet 650 mg, 650 mg, Oral, Q6H PRN **OR** acetaminophen (TYLENOL) suppository 650 mg, 650 mg, Rectal, Q6H PRN  ceFEPIme (MAXIPIME) 2,000 mg in sodium chloride 0.9 % 100 mL IVPB (addEASE), 2,000 mg, IntraVENous, Q8H  oxyCODONE-acetaminophen (PERCOCET) 5-325 MG per tablet 1 tablet, 1 tablet, Oral, Q4H PRN  insulin lispro (HUMALOG,ADMELOG) injection vial 0-8 Units, 0-8 Units, SubCUTAneous, 4x Daily AC & HS  glucose chewable tablet 16 g, 4 tablet, Oral, PRN  dextrose bolus 10% 125 mL, 125 mL, IntraVENous, PRN **OR** dextrose bolus 10% 250 mL, 250 mL, IntraVENous, PRN  glucagon injection 1 mg, 1 mg, SubCUTAneous, PRN  dextrose 10 % infusion, , IntraVENous, Continuous PRN  vancomycin (VANCOCIN) intermittent dosing (placeholder), , Other, RX Placeholder  vancomycin (VANCOCIN) 1,500 mg in sodium chloride 0.9 % 250 mL (addEASE) IVPB, 1,500 mg, IntraVENous, Once  ketorolac (TORADOL) injection 15 mg, 15 mg, IntraVENous, Q6H

## 2025-07-28 NOTE — PROGRESS NOTES
4 Eyes Skin Assessment     NAME:  Tony Boyle  YOB: 1955  MEDICAL RECORD NUMBER:  [id number]    The patient is being assessed for  Admission    I agree that at least one RN has performed a thorough Head to Toe Skin Assessment on the patient. ALL assessment sites listed below have been assessed.      Areas assessed by both nurses:    Head, Face, Ears, Shoulders, Back, Chest, Arms, Elbows, Hands, Sacrum. Buttock, Coccyx, Ischium, and Legs. Feet and Heels        Does the Patient have a Wound? Yes wound(s) were present on assessment. LDA wound assessment was Initiated and completed by RN       Juaquin Prevention initiated by RN: No  Wound Care Orders initiated by RN: Yes    For hospital-acquired stage 1 & 2 and ALL Stage 3,4, Unstageable, DTI, NWPT, and Complex wounds: place order “IP Wound Care/Ostomy Nurse Eval and Treat” by RN under : No    New Ostomies, if present place, Ostomy referral order under : No     Nurse 1 eSignature: Electronically signed by Fidelina Newby RN on 7/28/25 at 2:02 PM EDT    **SHARE this note so that the co-signing nurse can place an eSignature**    Nurse 2 eSignature: {Esignature:[id number]}

## 2025-07-28 NOTE — ANESTHESIA PRE PROCEDURE
Department of Anesthesiology  Preprocedure Note       Name:  Tony Boyle   Age:  69 y.o.  :  1955                                          MRN:  [id number]         Date:  2025      Surgeon: Surgeon(s):  Jimbo Bruno DPM    Procedure: Procedure(s):  AMPUTATION LEFT THIRD TOE    Medications prior to admission:   Prior to Admission medications    Medication Sig Start Date End Date Taking? Authorizing Provider   rosuvastatin (CRESTOR) 20 MG tablet Take 1 tablet by mouth daily   Yes Juana Hunt MD   amLODIPine (NORVASC) 5 MG tablet Take 1 tablet by mouth daily 10/19/24  Yes Taye Nix MD   propranolol (INDERAL) 40 MG tablet Take 1 tablet by mouth 2 times daily 24  Yes Juana Hunt MD   traZODone (DESYREL) 100 MG tablet Take 2 tablets by mouth nightly 24  Yes Juana Hunt MD   lisinopril (PRINIVIL;ZESTRIL) 20 MG tablet Take 1 tablet by mouth daily   Yes Juana Hunt MD   Dapagliflozin Pro-metFORMIN ER (XIGDUO XR)  MG TB24 Take 1 tablet by mouth daily 24  Yes Juana Hunt MD   Insulin Degludec 200 UNIT/ML SOPN Inject 70 Units into the skin daily   Yes Juana Hunt MD   clonazePAM (KLONOPIN) 1 MG tablet Take 1 tablet by mouth nightly. 12/15/17  Yes Juana Hunt MD   desvenlafaxine succinate (PRISTIQ) 100 MG TB24 extended release tablet Take 1 tablet by mouth daily   Yes Juana Hunt MD   Continuous Glucose Sensor (DEXCOM G7 SENSOR) MISC 1 Device by Does not apply route every 10 days    Juana Hunt MD   tadalafil (CIALIS) 5 MG tablet Take 1 tablet by mouth daily    Juana Hunt MD   vitamin D (VITAMIN D3) 50 MCG (2000) CAPS capsule Take 1 capsule by mouth daily    Juana Hunt MD   nitroGLYCERIN (NITRO-DUR) 0.1 MG/HR Place 1 patch onto the skin daily 10/8/24   Jimbo Bruno DPM   Semaglutide, 1 MG/DOSE, 2 MG/1.5ML SOPN Inject 1 mg into the skin once a week  Patient not

## 2025-07-28 NOTE — PLAN OF CARE
Problem: Chronic Conditions and Co-morbidities  Goal: Patient's chronic conditions and co-morbidity symptoms are monitored and maintained or improved  7/28/2025 1642 by Fidelina Newby RN  Outcome: Progressing     Problem: Discharge Planning  Goal: Discharge to home or other facility with appropriate resources  7/28/2025 1642 by Fidelina Newby, RN  Outcome: Progressing

## 2025-07-28 NOTE — PROGRESS NOTES
Pharmacy Medication Reconciliation Note     List of medications patient is currently taking is complete.    Source of information:   1. Conversation with [place] at bedside   2. Fill hx and EMR      Notes regarding home medications:   1.  Added Crestor  2.  [place] confirmed he is taking meds as marked on the list.     Luz Maria William [facility]   7/28/2025  1:05 PM

## 2025-07-28 NOTE — CARE COORDINATION
Case Management Assessment  Initial Evaluation    Date/Time of Evaluation: 7/28/2025 2:33 PM  Assessment Completed by: DEQUAN Le    If patient is discharged prior to next notation, then this note serves as note for discharge by case management.    Patient Name: Tony Boyle                   YOB: 1955  Diagnosis: Acute osteomyelitis of left foot (HCC) [M86.172]  Osteomyelitis (HCC) [M86.9]                   Date / Time: 7/28/2025 11:22 AM    Patient Admission Status: Inpatient   Readmission Risk (Low < 19, Mod (19-27), High > 27): Readmission Risk Score: 10.3    Current PCP: Hebert Bolivar MD  PCP verified by CM? (P) Yes    Chart Reviewed: Yes      History Provided by: (P) Patient  Patient Orientation: (P) Alert and Oriented, Person    Patient Cognition: (P) Alert    Hospitalization in the last 30 days (Readmission):  No    If yes, Readmission Assessment in CM Navigator will be completed.    Advance Directives:      Code Status: Full Code   Patient's Primary Decision Maker is: (P) Legal Next of Kin    Primary Decision Maker: JhonSylvie - Child - [phone]    Secondary Decision Maker: Janie Boyle - Child - [phone]    Discharge Planning:    Patient lives with: (P) Family Members Type of Home: (P) House  Primary Care Giver: (P) Family  Patient Support Systems include: (P) Family Members   Current Financial resources: (P) None  Current community resources: (P) None  Current services prior to admission: (P) Durable Medical Equipment            Current DME: (P) Walker, Cane            Type of Home Care services:  (P) None    ADLS  Prior functional level: (P) Independent in ADLs/IADLs, Mobility, Assistance with the following:  Current functional level: (P) Independent in ADLs/IADLs, Mobility, Assistance with the following:    PT AM-PAC:   /24  OT AM-PAC:   /24    Family can provide assistance at DC: (P) Yes  Would you like Case Management to discuss the discharge plan with any

## 2025-07-28 NOTE — PROGRESS NOTES
Patient admitted directly to room 4272 from home Patient is alert and oriented x4, complains of pain rated 4 and tolerable on ;eft foot . Patient is room air and sat ok. IV in place and patent. Skin assessment completed with one open areas on left foot . 3rd toe. Patient has been oriented to room and floor policies. Care plan reviewed with patient/ and family .Fall precautions in place at this time. Call light within reach bed low position, bed alarm on. Patient has been encouraged to call when ambulating. ongoing monitoring

## 2025-07-28 NOTE — PROGRESS NOTES
Clinical Pharmacy Note  Vancomycin Consult    Tony Boyle is a 69 y.o. male ordered vancomycin for skin/soft tissue infection; consult received from Dr. Lo to manage therapy. Also receiving cefepime.    Allergies:  Patient has no known allergies.     Temp max:  Temp (24hrs), Av.9 °F (36.6 °C), Min:97.9 °F (36.6 °C), Max:97.9 °F (36.6 °C)      Recent Labs     25  1244   WBC 7.5       Recent Labs     25  1243   BUN 15   CREATININE 0.7*         Intake/Output Summary (Last 24 hours) at 2025 1633  Last data filed at 2025 1340  Gross per 24 hour   Intake 480 ml   Output --   Net 480 ml       Culture Results:  pending    Ht Readings from Last 1 Encounters:   25 1.829 m (6')        Wt Readings from Last 1 Encounters:   25 115.8 kg (255 lb 4.7 oz)         Estimated Creatinine Clearance: 127 mL/min (A) (based on SCr of 0.7 mg/dL (L)).    Assessment/Plan:  Day # 1 of vancomycin.  Vancomycin 1500 mg x 1 dose given in ER  Vancomycin 1250 mg IV every 12 hours.    Goal -600  Predicted  (24-48H), 489 (steady state)  Will order level tomorrow with AM labs    Thank you for the consult.   Francie Holbrook, PharmD, BCPS  2025 4:34 PM

## 2025-07-29 ENCOUNTER — APPOINTMENT (OUTPATIENT)
Dept: GENERAL RADIOLOGY | Age: 70
End: 2025-07-29
Attending: HOSPITALIST
Payer: MEDICARE

## 2025-07-29 ENCOUNTER — ANESTHESIA (OUTPATIENT)
Dept: OPERATING ROOM | Age: 70
End: 2025-07-29
Payer: MEDICARE

## 2025-07-29 PROBLEM — M86.172 ACUTE OSTEOMYELITIS OF LEFT FOOT (HCC): Status: ACTIVE | Noted: 2025-07-29

## 2025-07-29 LAB
ALBUMIN SERPL-MCNC: 3.1 G/DL (ref 3.4–5)
ALBUMIN/GLOB SERPL: 1 {RATIO} (ref 1.1–2.2)
ALP SERPL-CCNC: 82 U/L (ref 40–129)
ALT SERPL-CCNC: <5 U/L (ref 10–40)
ANION GAP SERPL CALCULATED.3IONS-SCNC: 8 MMOL/L (ref 3–16)
AST SERPL-CCNC: 11 U/L (ref 15–37)
BASOPHILS # BLD: 0 K/UL (ref 0–0.2)
BASOPHILS NFR BLD: 0.5 %
BILIRUB SERPL-MCNC: 0.3 MG/DL (ref 0–1)
BUN SERPL-MCNC: 10 MG/DL (ref 7–20)
CALCIUM SERPL-MCNC: 8.3 MG/DL (ref 8.3–10.6)
CHLORIDE SERPL-SCNC: 106 MMOL/L (ref 99–110)
CO2 SERPL-SCNC: 26 MMOL/L (ref 21–32)
CREAT SERPL-MCNC: 0.6 MG/DL (ref 0.8–1.3)
DEPRECATED RDW RBC AUTO: 16.3 % (ref 12.4–15.4)
EOSINOPHIL # BLD: 0.1 K/UL (ref 0–0.6)
EOSINOPHIL NFR BLD: 1.6 %
EST. AVERAGE GLUCOSE BLD GHB EST-MCNC: 174.3 MG/DL
GFR SERPLBLD CREATININE-BSD FMLA CKD-EPI: >90 ML/MIN/{1.73_M2}
GLUCOSE BLD-MCNC: 140 MG/DL (ref 70–99)
GLUCOSE BLD-MCNC: 258 MG/DL (ref 70–99)
GLUCOSE BLD-MCNC: 63 MG/DL (ref 70–99)
GLUCOSE BLD-MCNC: 72 MG/DL (ref 70–99)
GLUCOSE BLD-MCNC: 78 MG/DL (ref 70–99)
GLUCOSE BLD-MCNC: 82 MG/DL (ref 70–99)
GLUCOSE BLD-MCNC: 89 MG/DL (ref 70–99)
GLUCOSE BLD-MCNC: 94 MG/DL (ref 70–99)
GLUCOSE SERPL-MCNC: 83 MG/DL (ref 70–99)
HBA1C MFR BLD: 7.7 %
HCT VFR BLD AUTO: 37.2 % (ref 40.5–52.5)
HGB BLD-MCNC: 12.3 G/DL (ref 13.5–17.5)
LYMPHOCYTES # BLD: 1.8 K/UL (ref 1–5.1)
LYMPHOCYTES NFR BLD: 23.9 %
MCH RBC QN AUTO: 28.7 PG (ref 26–34)
MCHC RBC AUTO-ENTMCNC: 33 G/DL (ref 31–36)
MCV RBC AUTO: 86.9 FL (ref 80–100)
MONOCYTES # BLD: 0.8 K/UL (ref 0–1.3)
MONOCYTES NFR BLD: 10.9 %
MRSA DNA SPEC QL NAA+PROBE: NORMAL
NEUTROPHILS # BLD: 4.8 K/UL (ref 1.7–7.7)
NEUTROPHILS NFR BLD: 63.1 %
PERFORMED ON: ABNORMAL
PERFORMED ON: NORMAL
PLATELET # BLD AUTO: 251 K/UL (ref 135–450)
PMV BLD AUTO: 8 FL (ref 5–10.5)
POTASSIUM SERPL-SCNC: 3.7 MMOL/L (ref 3.5–5.1)
PROT SERPL-MCNC: 6.2 G/DL (ref 6.4–8.2)
RBC # BLD AUTO: 4.28 M/UL (ref 4.2–5.9)
SODIUM SERPL-SCNC: 140 MMOL/L (ref 136–145)
VANCOMYCIN SERPL-MCNC: 15.7 UG/ML
VANCOMYCIN SERPL-MCNC: 8.9 UG/ML
WBC # BLD AUTO: 7.5 K/UL (ref 4–11)

## 2025-07-29 PROCEDURE — 3600000003 HC SURGERY LEVEL 3 BASE: Performed by: PODIATRIST

## 2025-07-29 PROCEDURE — 87075 CULTR BACTERIA EXCEPT BLOOD: CPT

## 2025-07-29 PROCEDURE — 6360000002 HC RX W HCPCS

## 2025-07-29 PROCEDURE — 2500000003 HC RX 250 WO HCPCS: Performed by: PODIATRIST

## 2025-07-29 PROCEDURE — 0Y6S0Z1 DETACHMENT AT LEFT 2ND TOE, HIGH, OPEN APPROACH: ICD-10-PCS | Performed by: PODIATRIST

## 2025-07-29 PROCEDURE — 87070 CULTURE OTHR SPECIMN AEROBIC: CPT

## 2025-07-29 PROCEDURE — 6360000002 HC RX W HCPCS: Performed by: PODIATRIST

## 2025-07-29 PROCEDURE — 2580000003 HC RX 258

## 2025-07-29 PROCEDURE — 97161 PT EVAL LOW COMPLEX 20 MIN: CPT

## 2025-07-29 PROCEDURE — 6370000000 HC RX 637 (ALT 250 FOR IP)

## 2025-07-29 PROCEDURE — 0Y6U0Z1 DETACHMENT AT LEFT 3RD TOE, HIGH, OPEN APPROACH: ICD-10-PCS | Performed by: PODIATRIST

## 2025-07-29 PROCEDURE — 2500000003 HC RX 250 WO HCPCS

## 2025-07-29 PROCEDURE — 94760 N-INVAS EAR/PLS OXIMETRY 1: CPT

## 2025-07-29 PROCEDURE — 7100000000 HC PACU RECOVERY - FIRST 15 MIN: Performed by: PODIATRIST

## 2025-07-29 PROCEDURE — 7100000001 HC PACU RECOVERY - ADDTL 15 MIN: Performed by: PODIATRIST

## 2025-07-29 PROCEDURE — 85025 COMPLETE CBC W/AUTO DIFF WBC: CPT

## 2025-07-29 PROCEDURE — 36415 COLL VENOUS BLD VENIPUNCTURE: CPT

## 2025-07-29 PROCEDURE — 80053 COMPREHEN METABOLIC PANEL: CPT

## 2025-07-29 PROCEDURE — 87077 CULTURE AEROBIC IDENTIFY: CPT

## 2025-07-29 PROCEDURE — 2580000003 HC RX 258: Performed by: HOSPITALIST

## 2025-07-29 PROCEDURE — 87076 CULTURE ANAEROBE IDENT EACH: CPT

## 2025-07-29 PROCEDURE — 3700000000 HC ANESTHESIA ATTENDED CARE: Performed by: PODIATRIST

## 2025-07-29 PROCEDURE — 3600000013 HC SURGERY LEVEL 3 ADDTL 15MIN: Performed by: PODIATRIST

## 2025-07-29 PROCEDURE — 2709999900 HC NON-CHARGEABLE SUPPLY: Performed by: PODIATRIST

## 2025-07-29 PROCEDURE — 97116 GAIT TRAINING THERAPY: CPT

## 2025-07-29 PROCEDURE — 87205 SMEAR GRAM STAIN: CPT

## 2025-07-29 PROCEDURE — 6360000002 HC RX W HCPCS: Performed by: HOSPITALIST

## 2025-07-29 PROCEDURE — 88305 TISSUE EXAM BY PATHOLOGIST: CPT

## 2025-07-29 PROCEDURE — 3700000001 HC ADD 15 MINUTES (ANESTHESIA): Performed by: PODIATRIST

## 2025-07-29 PROCEDURE — 80202 ASSAY OF VANCOMYCIN: CPT

## 2025-07-29 PROCEDURE — 73630 X-RAY EXAM OF FOOT: CPT

## 2025-07-29 PROCEDURE — 1200000000 HC SEMI PRIVATE

## 2025-07-29 PROCEDURE — 83036 HEMOGLOBIN GLYCOSYLATED A1C: CPT

## 2025-07-29 RX ORDER — SODIUM CHLORIDE 0.9 % (FLUSH) 0.9 %
5-40 SYRINGE (ML) INJECTION EVERY 12 HOURS SCHEDULED
Status: DISCONTINUED | OUTPATIENT
Start: 2025-07-29 | End: 2025-07-29 | Stop reason: HOSPADM

## 2025-07-29 RX ORDER — LIDOCAINE HYDROCHLORIDE 20 MG/ML
INJECTION, SOLUTION EPIDURAL; INFILTRATION; INTRACAUDAL; PERINEURAL
Status: DISCONTINUED | OUTPATIENT
Start: 2025-07-29 | End: 2025-07-29 | Stop reason: SDUPTHER

## 2025-07-29 RX ORDER — SODIUM CHLORIDE 9 MG/ML
INJECTION, SOLUTION INTRAVENOUS PRN
Status: DISCONTINUED | OUTPATIENT
Start: 2025-07-29 | End: 2025-07-29 | Stop reason: HOSPADM

## 2025-07-29 RX ORDER — IPRATROPIUM BROMIDE AND ALBUTEROL SULFATE 2.5; .5 MG/3ML; MG/3ML
1 SOLUTION RESPIRATORY (INHALATION)
Status: DISCONTINUED | OUTPATIENT
Start: 2025-07-29 | End: 2025-07-29 | Stop reason: HOSPADM

## 2025-07-29 RX ORDER — FENTANYL CITRATE 50 UG/ML
50 INJECTION, SOLUTION INTRAMUSCULAR; INTRAVENOUS EVERY 5 MIN PRN
Status: DISCONTINUED | OUTPATIENT
Start: 2025-07-29 | End: 2025-07-29 | Stop reason: HOSPADM

## 2025-07-29 RX ORDER — FENTANYL CITRATE 50 UG/ML
25 INJECTION, SOLUTION INTRAMUSCULAR; INTRAVENOUS EVERY 5 MIN PRN
Status: DISCONTINUED | OUTPATIENT
Start: 2025-07-29 | End: 2025-07-29 | Stop reason: HOSPADM

## 2025-07-29 RX ORDER — HYDRALAZINE HYDROCHLORIDE 20 MG/ML
10 INJECTION INTRAMUSCULAR; INTRAVENOUS
Status: DISCONTINUED | OUTPATIENT
Start: 2025-07-29 | End: 2025-07-29 | Stop reason: HOSPADM

## 2025-07-29 RX ORDER — ONDANSETRON 2 MG/ML
4 INJECTION INTRAMUSCULAR; INTRAVENOUS
Status: DISCONTINUED | OUTPATIENT
Start: 2025-07-29 | End: 2025-07-29 | Stop reason: HOSPADM

## 2025-07-29 RX ORDER — LABETALOL HYDROCHLORIDE 5 MG/ML
10 INJECTION, SOLUTION INTRAVENOUS
Status: DISCONTINUED | OUTPATIENT
Start: 2025-07-29 | End: 2025-07-29 | Stop reason: HOSPADM

## 2025-07-29 RX ORDER — MAGNESIUM HYDROXIDE 1200 MG/15ML
LIQUID ORAL CONTINUOUS PRN
Status: DISCONTINUED | OUTPATIENT
Start: 2025-07-29 | End: 2025-07-29 | Stop reason: HOSPADM

## 2025-07-29 RX ORDER — FENTANYL CITRATE 50 UG/ML
INJECTION, SOLUTION INTRAMUSCULAR; INTRAVENOUS
Status: DISCONTINUED | OUTPATIENT
Start: 2025-07-29 | End: 2025-07-29 | Stop reason: SDUPTHER

## 2025-07-29 RX ORDER — SODIUM CHLORIDE 0.9 % (FLUSH) 0.9 %
5-40 SYRINGE (ML) INJECTION PRN
Status: DISCONTINUED | OUTPATIENT
Start: 2025-07-29 | End: 2025-07-29 | Stop reason: HOSPADM

## 2025-07-29 RX ORDER — OXYCODONE HYDROCHLORIDE 5 MG/1
5 TABLET ORAL
Status: DISCONTINUED | OUTPATIENT
Start: 2025-07-29 | End: 2025-07-29 | Stop reason: HOSPADM

## 2025-07-29 RX ORDER — PROCHLORPERAZINE EDISYLATE 5 MG/ML
10 INJECTION INTRAMUSCULAR; INTRAVENOUS
Status: DISCONTINUED | OUTPATIENT
Start: 2025-07-29 | End: 2025-07-29 | Stop reason: HOSPADM

## 2025-07-29 RX ORDER — PROPOFOL 10 MG/ML
INJECTION, EMULSION INTRAVENOUS
Status: DISCONTINUED | OUTPATIENT
Start: 2025-07-29 | End: 2025-07-29 | Stop reason: SDUPTHER

## 2025-07-29 RX ADMIN — ACETAMINOPHEN 650 MG: 325 TABLET ORAL at 20:19

## 2025-07-29 RX ADMIN — SODIUM CHLORIDE 1250 MG: 0.9 INJECTION, SOLUTION INTRAVENOUS at 01:28

## 2025-07-29 RX ADMIN — AMLODIPINE BESYLATE 5 MG: 5 TABLET ORAL at 09:34

## 2025-07-29 RX ADMIN — LIDOCAINE HYDROCHLORIDE 80 MG: 20 INJECTION, SOLUTION EPIDURAL; INFILTRATION; INTRACAUDAL; PERINEURAL at 07:29

## 2025-07-29 RX ADMIN — INSULIN LISPRO 4 UNITS: 100 INJECTION, SOLUTION INTRAVENOUS; SUBCUTANEOUS at 20:31

## 2025-07-29 RX ADMIN — PROPRANOLOL HYDROCHLORIDE 40 MG: 40 TABLET ORAL at 09:35

## 2025-07-29 RX ADMIN — CEFEPIME 2000 MG: 2 INJECTION, POWDER, FOR SOLUTION INTRAVENOUS at 20:22

## 2025-07-29 RX ADMIN — Medication 10 ML: at 09:39

## 2025-07-29 RX ADMIN — FENTANYL CITRATE 25 MCG: 50 INJECTION INTRAMUSCULAR; INTRAVENOUS at 07:38

## 2025-07-29 RX ADMIN — OXYCODONE HYDROCHLORIDE AND ACETAMINOPHEN 1 TABLET: 5; 325 TABLET ORAL at 20:28

## 2025-07-29 RX ADMIN — INSULIN GLARGINE 50 UNITS: 100 INJECTION, SOLUTION SUBCUTANEOUS at 20:31

## 2025-07-29 RX ADMIN — PROPOFOL 160 MCG/KG/MIN: 10 INJECTION, EMULSION INTRAVENOUS at 07:30

## 2025-07-29 RX ADMIN — PROPOFOL 50 MG: 10 INJECTION, EMULSION INTRAVENOUS at 07:29

## 2025-07-29 RX ADMIN — VENLAFAXINE HYDROCHLORIDE 75 MG: 75 CAPSULE, EXTENDED RELEASE ORAL at 09:35

## 2025-07-29 RX ADMIN — PROPRANOLOL HYDROCHLORIDE 40 MG: 40 TABLET ORAL at 20:19

## 2025-07-29 RX ADMIN — TRAZODONE HYDROCHLORIDE 200 MG: 100 TABLET ORAL at 20:19

## 2025-07-29 RX ADMIN — ROSUVASTATIN CALCIUM 20 MG: 20 TABLET, FILM COATED ORAL at 09:35

## 2025-07-29 RX ADMIN — LISINOPRIL 20 MG: 20 TABLET ORAL at 09:39

## 2025-07-29 RX ADMIN — KETOROLAC TROMETHAMINE 15 MG: 15 INJECTION, SOLUTION INTRAMUSCULAR; INTRAVENOUS at 16:47

## 2025-07-29 RX ADMIN — SODIUM CHLORIDE: 9 INJECTION, SOLUTION INTRAVENOUS at 07:25

## 2025-07-29 RX ADMIN — CEFEPIME 2000 MG: 2 INJECTION, POWDER, FOR SOLUTION INTRAVENOUS at 13:18

## 2025-07-29 RX ADMIN — FENTANYL CITRATE 25 MCG: 50 INJECTION INTRAMUSCULAR; INTRAVENOUS at 07:33

## 2025-07-29 RX ADMIN — CLONAZEPAM 1 MG: 1 TABLET ORAL at 20:18

## 2025-07-29 RX ADMIN — VANCOMYCIN HYDROCHLORIDE 1500 MG: 1.5 INJECTION, POWDER, LYOPHILIZED, FOR SOLUTION INTRAVENOUS at 13:16

## 2025-07-29 RX ADMIN — ENOXAPARIN SODIUM 40 MG: 100 INJECTION SUBCUTANEOUS at 09:35

## 2025-07-29 RX ADMIN — CEFEPIME 2000 MG: 2 INJECTION, POWDER, FOR SOLUTION INTRAVENOUS at 05:59

## 2025-07-29 ASSESSMENT — PAIN DESCRIPTION - DESCRIPTORS
DESCRIPTORS: ACHING
DESCRIPTORS: ACHING;BURNING
DESCRIPTORS: ACHING;DISCOMFORT

## 2025-07-29 ASSESSMENT — PAIN DESCRIPTION - LOCATION
LOCATION: FOOT
LOCATION: FOOT

## 2025-07-29 ASSESSMENT — PAIN - FUNCTIONAL ASSESSMENT
PAIN_FUNCTIONAL_ASSESSMENT: ACTIVITIES ARE NOT PREVENTED
PAIN_FUNCTIONAL_ASSESSMENT: 0-10
PAIN_FUNCTIONAL_ASSESSMENT: PREVENTS OR INTERFERES WITH MANY ACTIVE NOT PASSIVE ACTIVITIES

## 2025-07-29 ASSESSMENT — PAIN SCALES - GENERAL
PAINLEVEL_OUTOF10: 9
PAINLEVEL_OUTOF10: 8

## 2025-07-29 ASSESSMENT — PAIN DESCRIPTION - ORIENTATION
ORIENTATION: LEFT
ORIENTATION: LEFT

## 2025-07-29 NOTE — PLAN OF CARE
Problem: Chronic Conditions and Co-morbidities  Goal: Patient's chronic conditions and co-morbidity symptoms are monitored and maintained or improved  Outcome: Progressing  Flowsheets (Taken 7/28/2025 2056)  Care Plan - Patient's Chronic Conditions and Co-Morbidity Symptoms are Monitored and Maintained or Improved:   Monitor and assess patient's chronic conditions and comorbid symptoms for stability, deterioration, or improvement   Collaborate with multidisciplinary team to address chronic and comorbid conditions and prevent exacerbation or deterioration   Update acute care plan with appropriate goals if chronic or comorbid symptoms are exacerbated and prevent overall improvement and discharge     Problem: Discharge Planning  Goal: Discharge to home or other facility with appropriate resources  Outcome: Progressing  Flowsheets (Taken 7/28/2025 2056)  Discharge to home or other facility with appropriate resources:   Identify barriers to discharge with patient and caregiver   Arrange for needed discharge resources and transportation as appropriate   Identify discharge learning needs (meds, wound care, etc)   Refer to discharge planning if patient needs post-hospital services based on physician order or complex needs related to functional status, cognitive ability or social support system     Problem: Safety - Adult  Goal: Free from fall injury  Outcome: Progressing     Problem: Skin/Tissue Integrity - Adult  Goal: Incisions, wounds, or drain sites healing without S/S of infection  Outcome: Progressing  Flowsheets (Taken 7/28/2025 2056)  Incisions, Wounds, or Drain Sites Healing Without Sign and Symptoms of Infection:   ADMISSION and DAILY: Assess and document risk factors for pressure ulcer development   TWICE DAILY: Assess and document skin integrity   Initiate isolation precautions as appropriate   Initiate pressure ulcer prevention bundle as indicated     Problem: Metabolic/Fluid and Electrolytes - Adult  Goal:

## 2025-07-29 NOTE — PROGRESS NOTES
Pt refused scheduled Lantus 50u.  States that he usually takes Lantus in the morning.  Pts 2102 blood sugar= 116. Medication held.  Notification sent to Sofiya Tellez NP on call.  NPO ordered after midnight for a procedure.  Will continue to monitor.

## 2025-07-29 NOTE — PLAN OF CARE
Problem: Chronic Conditions and Co-morbidities  Goal: Patient's chronic conditions and co-morbidity symptoms are monitored and maintained or improved  7/29/2025 1453 by Fidelina Newby RN  Outcome: Progressing     Problem: Discharge Planning  Goal: Discharge to home or other facility with appropriate resources  7/29/2025 1453 by Fidelina Newby RN  Outcome: Progressing     Problem: Safety - Adult  Goal: Free from fall injury  7/29/2025 1453 by Fidelina Newby RN  Outcome: Progressing     Problem: Skin/Tissue Integrity - Adult  Goal: Incisions, wounds, or drain sites healing without S/S of infection  7/29/2025 1453 by Fidelina Newby RN  Outcome: Progressing     Problem: Metabolic/Fluid and Electrolytes - Adult  Goal: Glucose maintained within prescribed range  7/29/2025 1453 by Fidelina Newby, RN  Outcome: Progressing     Problem: ABCDS Injury Assessment  Goal: Absence of physical injury  Outcome: Progressing

## 2025-07-29 NOTE — ANESTHESIA POSTPROCEDURE EVALUATION
Department of Anesthesiology  Postprocedure Note    Patient: Tony Boyle  MRN: [id number]  YOB: 1955  Date of evaluation: 7/29/2025    Procedure Summary       Date: 07/29/25 Room / Location: [address]    Anesthesia Start: 0725 Anesthesia Stop: 0812    Procedure: AMPUTATION LEFT THIRD AND SECOND TOE (Left) Diagnosis:       Osteomyelitis of ankle or foot, left, acute (HCC)      (Osteomyelitis of ankle or foot, left, acute (HCC) [M86.172])    Surgeons: Jimbo Bruno DPM Responsible Provider: Christian Olea MD    Anesthesia Type: MAC ASA Status: 3            Anesthesia Type: MAC    Stephen Phase I: Stephen Score: 10    Stephen Phase II:      Anesthesia Post Evaluation    Patient location during evaluation: PACU  Patient participation: complete - patient participated  Level of consciousness: awake and alert  Airway patency: patent  Nausea & Vomiting: no nausea and no vomiting  Cardiovascular status: hemodynamically stable and blood pressure returned to baseline  Respiratory status: spontaneous ventilation and nonlabored ventilation  Hydration status: stable  Comments: Mr. Boyle was seen resting comfortably following uneventful MAC anesthetic. Appropriate for return to floor.   Pain management: adequate    No notable events documented.

## 2025-07-29 NOTE — PROGRESS NOTES
Clinical Pharmacy Note  Vancomycin Consult    Tony Boyle is a 69 y.o. male ordered vancomycin for skin/soft tissue infection; consult received from Dr. Lo to manage therapy. Also receiving cefepime.    Allergies:  Patient has no known allergies.     Temp max:  Temp (24hrs), Av.7 °F (37.1 °C), Min:96.8 °F (36 °C), Max:101.1 °F (38.4 °C)      Recent Labs     25  1244 25  0435   WBC 7.5 7.5       Recent Labs     25  1243 25  0435   BUN 15 10   CREATININE 0.7* 0.6*         Intake/Output Summary (Last 24 hours) at 2025 1240  Last data filed at 2025 0805  Gross per 24 hour   Intake 2460 ml   Output --   Net 2460 ml       Culture Results:  pending    Ht Readings from Last 1 Encounters:   25 1.829 m (6')        Wt Readings from Last 1 Encounters:   25 114 kg (251 lb 5.2 oz)         Estimated Creatinine Clearance: 147 mL/min (A) (based on SCr of 0.6 mg/dL (L)).    Assessment:  Day # 2 of vancomycin.  Current regimen: 1250 mg every 12 hours  Vancomycin level: 8.9 mg/L  Predicted AUC: 387 (24-48H)    Plan:  Change regimen to 1500 mg every 12 hours.  Predicted  (24-48H), 494 (steady state)  Will obtain level after 2 days of new regimen ( at 1000)    Thank you for the consult.   Francie Holbrook, PharmD, BCPS  2025 2:19 PM

## 2025-07-29 NOTE — BRIEF OP NOTE
Brief Postoperative Note      Patient: Tony Boyle  YOB: 1955  MRN: [id number]    Date of Procedure: 7/29/2025    Pre-Op Diagnosis Codes:      * Osteomyelitis of ankle or foot, left, acute ([facility]) [M86.172]    Post-Op Diagnosis: Same       Procedure(s):  AMPUTATION LEFT THIRD AND SECOND TOE    Surgeon(s):  Jimbo Bruno DPM    Assistant:  Carlos Koch DPM, PGY-1    Anesthesia: Monitor Anesthesia Care    Hemostasis: anatomic dissection    Injectables: Pre-Op 20 cc of 1% Mepivacaine plain    Materials:  2-0 Nylon, 3-0 Nylon    Implants: None        DISPO: S/P amputation of second and third toe, left foot. Recommend patient continue on antibiotics per ID ( Cefepime, Vancomycin). Soft tissue culture sent for microbiologic examination. Clearance fragment sent to pathology. Procedure was felt to be definitive as second and third metatarsal heads were white, healthy, and shiny. Post op XR ordered. Patient is partial heel weightbearing to left foot with surgical shoe. PT/OT consulted; appreciate recommendations. Podiatry will continue to follow patient while in house.     Estimated Blood Loss (mL): Minimal    Complications: None    Specimens:   ID Type Source Tests Collected by Time Destination   1 : 1) SWAB LEFT FOOT Specimen Foot CULTURE, SURGICAL Jimbo Bruno DPM 7/29/2025 0791    A : A) LEFT 2ND AND 3RD TOES--( OBSERVE PROXIMALLY FOR OSTEOMYELITIS) Specimen Foot SURGICAL PATHOLOGY Jimbo Bruno DPM 7/29/2025 0779        Implants:  * No implants in log *      Drains: * No LDAs found *    Findings:  Present At Time Of Surgery (PATOS) (choose all levels that have infection present):  - Organ Space infection (below fascia) present as evidenced by osteomyelitis  Other Findings: Erosive changes to the left second and third digits consistent with osteomyelitis. Second and third metatarsal heads were white and shiny. No purulence identified in all fascial planes.     Carlos Koch DPM  Podiatric

## 2025-07-29 NOTE — PROGRESS NOTES
Patient is back in trhe room from procedure, AOX4, ,dressing noted  on left foot with gauze and ace band. Dressing clean dry and intact, No drainage or blood noted on dressing. Pt denies pains at this time. Ongoing monitoring.

## 2025-07-29 NOTE — PROGRESS NOTES
V2.0  [facility] Hospitalist Progress Note      Name:  Tony Boyle /Age/Sex: 1955  (69 y.o. male)   MRN & CSN:  [id number] & [id number] Encounter Date/Time: 2025 1:54 PM EDT    Location:  C1V-3477/4272-01 PCP: Hebert Bolivar MD       Hospital Day: 2    Subjective:   Chief Complaint: Follow-up gangrenous toe    Seen and evaluated at bedside, had surgery this morning    Review of Systems:    Review of Systems    10 point ROS negative except as stated above in \"subjective\" section    Objective:     Intake/Output Summary (Last 24 hours) at 2025 1354  Last data filed at 2025 0805  Gross per 24 hour   Intake 1980 ml   Output --   Net 1980 ml      Vitals:   Vitals:    25 0913   BP: 139/76   Pulse: 80   Resp: 18   Temp: 98.2 °F (36.8 °C)   SpO2: 98%     Physical Exam:   General: Awake, alert and  NAD  Cardiovascular: S1S2 present, regular rate and rhythm, no murmurs  Respiratory: Clear to auscultation  Gastrointestinal: Soft, non tender, positive bowel sounds   Genitourinary: no suprapubic tenderness  Musculoskeletal: Dressing over foot    Medications:     Medications:    vancomycin  1,500 mg IntraVENous Q12H    sodium chloride flush  5-40 mL IntraVENous 2 times per day    enoxaparin  40 mg SubCUTAneous Daily    cefepime  2,000 mg IntraVENous Q8H    insulin lispro  0-8 Units SubCUTAneous 4x Daily AC & HS    vancomycin (VANCOCIN) intermittent dosing (placeholder)   Other RX Placeholder    amLODIPine  5 mg Oral Daily    clonazePAM  1 mg Oral Nightly    venlafaxine  75 mg Oral Daily with breakfast    lisinopril  20 mg Oral Daily    propranolol  40 mg Oral BID    rosuvastatin  20 mg Oral Daily    traZODone  200 mg Oral Nightly    insulin glargine  50 Units SubCUTAneous Nightly      Infusions:    sodium chloride      dextrose         Labs      Recent Results (from the past 24 hours)   POCT Glucose    Collection Time: 25  4:13 PM   Result Value Ref Range    POC Glucose 120 (H) 70 - 99

## 2025-07-29 NOTE — PROGRESS NOTES
[facility] - Physical Therapy   Phone: [phone]    Physical Therapy  Unit: [facility] 4N MED SURG    [x] Initial Evaluation            [] Daily Treatment Note         [x] Discharge Summary      Patient: Tony Boyle   : 1955   MRN: [id number]   Date of Service:  2025  Staff Mobility Recommendation: Walker SBA/Supervision; Heel wgt bear     AM-PAC score: 22/24  Discharge Recommendations: Tony Boyle scored a 22/24 on the AM-PAC short mobility form.  At this time, no further PT is recommended upon discharge due to patient at independent level.  Recommend patient returns to prior setting with prior services.    Admitting Diagnosis: Osteomyelitis (HCC)  Ordering Physician: Dr August SPAULDING  Current Admission Summary: 68 y/o male admit 2025 with Osteomyelitis L Foot.  2025 S/P Amp  L 2nd & 3rd Toes.  PMH as noted including LBBB, Pacemaker, L Toe Amp (10/2024).     Past Medical History:  has a past medical history of Depression, Diabetes mellitus (HCC), GERD (gastroesophageal reflux disease), Hyperlipidemia, Hypertension, LBBB (left bundle branch block), and SVT (supraventricular tachycardia).  Past Surgical History:  has a past surgical history that includes Tonsillectomy; knee surgery (Right); Pacemaker insertion; Eye surgery (Right, 2023); Insertable Cardiac Monitor; Eye surgery (Left, 05/15/2023); Colonoscopy; Endoscopy, colon, diagnostic; Upper gastrointestinal endoscopy (N/A, 3/1/2024); Colonoscopy (N/A, 3/1/2024); Toe amputation (Left, 10/16/2024); Toe amputation (Left, 10/30/2024); and Toe amputation (Left, 2025).    Clinical Assessment: Patient presenting at independent level for completion of required mobility tasks for return to home.  Eval with d/c at this time.  No therapy services indicated.      DME Required For Discharge: patient has all required DME for discharge      Restrictions:  Precautions/Restrictions: contact precautions due to MDRO, weight

## 2025-07-30 LAB
ANION GAP SERPL CALCULATED.3IONS-SCNC: 8 MMOL/L (ref 3–16)
BASOPHILS # BLD: 0.1 K/UL (ref 0–0.2)
BASOPHILS NFR BLD: 1 %
BUN SERPL-MCNC: 10 MG/DL (ref 7–20)
CALCIUM SERPL-MCNC: 8.5 MG/DL (ref 8.3–10.6)
CHLORIDE SERPL-SCNC: 104 MMOL/L (ref 99–110)
CO2 SERPL-SCNC: 28 MMOL/L (ref 21–32)
CREAT SERPL-MCNC: 0.6 MG/DL (ref 0.8–1.3)
DEPRECATED RDW RBC AUTO: 16.4 % (ref 12.4–15.4)
EOSINOPHIL # BLD: 0.1 K/UL (ref 0–0.6)
EOSINOPHIL NFR BLD: 1.8 %
GFR SERPLBLD CREATININE-BSD FMLA CKD-EPI: >90 ML/MIN/{1.73_M2}
GLUCOSE BLD-MCNC: 109 MG/DL (ref 70–99)
GLUCOSE BLD-MCNC: 180 MG/DL (ref 70–99)
GLUCOSE BLD-MCNC: 193 MG/DL (ref 70–99)
GLUCOSE BLD-MCNC: 58 MG/DL (ref 70–99)
GLUCOSE BLD-MCNC: 94 MG/DL (ref 70–99)
GLUCOSE SERPL-MCNC: 52 MG/DL (ref 70–99)
HCT VFR BLD AUTO: 37.4 % (ref 40.5–52.5)
HGB BLD-MCNC: 12.4 G/DL (ref 13.5–17.5)
LYMPHOCYTES # BLD: 1.2 K/UL (ref 1–5.1)
LYMPHOCYTES NFR BLD: 17.1 %
MCH RBC QN AUTO: 28.7 PG (ref 26–34)
MCHC RBC AUTO-ENTMCNC: 33.1 G/DL (ref 31–36)
MCV RBC AUTO: 86.8 FL (ref 80–100)
MONOCYTES # BLD: 1 K/UL (ref 0–1.3)
MONOCYTES NFR BLD: 14 %
NEUTROPHILS # BLD: 4.5 K/UL (ref 1.7–7.7)
NEUTROPHILS NFR BLD: 66.1 %
PERFORMED ON: ABNORMAL
PERFORMED ON: NORMAL
PLATELET # BLD AUTO: 240 K/UL (ref 135–450)
PMV BLD AUTO: 7.9 FL (ref 5–10.5)
POTASSIUM SERPL-SCNC: 3.9 MMOL/L (ref 3.5–5.1)
RBC # BLD AUTO: 4.31 M/UL (ref 4.2–5.9)
SODIUM SERPL-SCNC: 140 MMOL/L (ref 136–145)
WBC # BLD AUTO: 6.9 K/UL (ref 4–11)

## 2025-07-30 PROCEDURE — 97530 THERAPEUTIC ACTIVITIES: CPT

## 2025-07-30 PROCEDURE — 87040 BLOOD CULTURE FOR BACTERIA: CPT

## 2025-07-30 PROCEDURE — 6360000002 HC RX W HCPCS: Performed by: INTERNAL MEDICINE

## 2025-07-30 PROCEDURE — G0545 PR INHERENT VISIT TO INPT: HCPCS | Performed by: INTERNAL MEDICINE

## 2025-07-30 PROCEDURE — 97165 OT EVAL LOW COMPLEX 30 MIN: CPT

## 2025-07-30 PROCEDURE — 6360000002 HC RX W HCPCS: Performed by: HOSPITALIST

## 2025-07-30 PROCEDURE — 2580000003 HC RX 258: Performed by: HOSPITALIST

## 2025-07-30 PROCEDURE — 85025 COMPLETE CBC W/AUTO DIFF WBC: CPT

## 2025-07-30 PROCEDURE — 6370000000 HC RX 637 (ALT 250 FOR IP): Performed by: HOSPITALIST

## 2025-07-30 PROCEDURE — 6370000000 HC RX 637 (ALT 250 FOR IP)

## 2025-07-30 PROCEDURE — 80048 BASIC METABOLIC PNL TOTAL CA: CPT

## 2025-07-30 PROCEDURE — 99223 1ST HOSP IP/OBS HIGH 75: CPT | Performed by: INTERNAL MEDICINE

## 2025-07-30 PROCEDURE — 94760 N-INVAS EAR/PLS OXIMETRY 1: CPT

## 2025-07-30 PROCEDURE — 36415 COLL VENOUS BLD VENIPUNCTURE: CPT

## 2025-07-30 PROCEDURE — 6360000002 HC RX W HCPCS

## 2025-07-30 PROCEDURE — 1200000000 HC SEMI PRIVATE

## 2025-07-30 PROCEDURE — 2580000003 HC RX 258

## 2025-07-30 RX ORDER — METRONIDAZOLE 500 MG/100ML
500 INJECTION, SOLUTION INTRAVENOUS EVERY 8 HOURS
Status: DISCONTINUED | OUTPATIENT
Start: 2025-07-30 | End: 2025-08-01 | Stop reason: HOSPADM

## 2025-07-30 RX ORDER — INSULIN GLARGINE 100 [IU]/ML
35 INJECTION, SOLUTION SUBCUTANEOUS NIGHTLY
Status: DISCONTINUED | OUTPATIENT
Start: 2025-07-30 | End: 2025-07-31

## 2025-07-30 RX ADMIN — CEFEPIME 2000 MG: 2 INJECTION, POWDER, FOR SOLUTION INTRAVENOUS at 05:42

## 2025-07-30 RX ADMIN — TRAZODONE HYDROCHLORIDE 200 MG: 100 TABLET ORAL at 21:36

## 2025-07-30 RX ADMIN — INSULIN GLARGINE 35 UNITS: 100 INJECTION, SOLUTION SUBCUTANEOUS at 21:45

## 2025-07-30 RX ADMIN — INSULIN LISPRO 2 UNITS: 100 INJECTION, SOLUTION INTRAVENOUS; SUBCUTANEOUS at 17:07

## 2025-07-30 RX ADMIN — CEFEPIME 2000 MG: 2 INJECTION, POWDER, FOR SOLUTION INTRAVENOUS at 22:55

## 2025-07-30 RX ADMIN — VENLAFAXINE HYDROCHLORIDE 75 MG: 75 CAPSULE, EXTENDED RELEASE ORAL at 08:48

## 2025-07-30 RX ADMIN — METRONIDAZOLE 500 MG: 500 INJECTION, SOLUTION INTRAVENOUS at 21:36

## 2025-07-30 RX ADMIN — PROPRANOLOL HYDROCHLORIDE 40 MG: 40 TABLET ORAL at 21:36

## 2025-07-30 RX ADMIN — ENOXAPARIN SODIUM 40 MG: 100 INJECTION SUBCUTANEOUS at 08:48

## 2025-07-30 RX ADMIN — CLONAZEPAM 1 MG: 1 TABLET ORAL at 21:36

## 2025-07-30 RX ADMIN — ROSUVASTATIN CALCIUM 20 MG: 20 TABLET, FILM COATED ORAL at 08:50

## 2025-07-30 RX ADMIN — VANCOMYCIN HYDROCHLORIDE 1500 MG: 1.5 INJECTION, POWDER, LYOPHILIZED, FOR SOLUTION INTRAVENOUS at 13:59

## 2025-07-30 RX ADMIN — VANCOMYCIN HYDROCHLORIDE 1500 MG: 1.5 INJECTION, POWDER, LYOPHILIZED, FOR SOLUTION INTRAVENOUS at 01:16

## 2025-07-30 RX ADMIN — PROPRANOLOL HYDROCHLORIDE 40 MG: 40 TABLET ORAL at 08:49

## 2025-07-30 RX ADMIN — METRONIDAZOLE 500 MG: 500 INJECTION, SOLUTION INTRAVENOUS at 12:20

## 2025-07-30 RX ADMIN — INSULIN LISPRO 2 UNITS: 100 INJECTION, SOLUTION INTRAVENOUS; SUBCUTANEOUS at 21:46

## 2025-07-30 RX ADMIN — CEFEPIME 2000 MG: 2 INJECTION, POWDER, FOR SOLUTION INTRAVENOUS at 13:22

## 2025-07-30 ASSESSMENT — PAIN SCALES - GENERAL
PAINLEVEL_OUTOF10: 0
PAINLEVEL_OUTOF10: 0

## 2025-07-30 NOTE — PLAN OF CARE
Problem: Chronic Conditions and Co-morbidities  Goal: Patient's chronic conditions and co-morbidity symptoms are monitored and maintained or improved  7/30/2025 0038 by Siva Rodriguez RN  Outcome: Progressing  Flowsheets (Taken 7/29/2025 1936)  Care Plan - Patient's Chronic Conditions and Co-Morbidity Symptoms are Monitored and Maintained or Improved:   Monitor and assess patient's chronic conditions and comorbid symptoms for stability, deterioration, or improvement   Collaborate with multidisciplinary team to address chronic and comorbid conditions and prevent exacerbation or deterioration   Update acute care plan with appropriate goals if chronic or comorbid symptoms are exacerbated and prevent overall improvement and discharge  7/29/2025 1453 by Fidelina Newby RN  Outcome: Progressing     Problem: Discharge Planning  Goal: Discharge to home or other facility with appropriate resources  7/30/2025 0038 by Siva Rodriguez RN  Outcome: Progressing  Flowsheets (Taken 7/29/2025 1936)  Discharge to home or other facility with appropriate resources:   Identify barriers to discharge with patient and caregiver   Arrange for needed discharge resources and transportation as appropriate   Identify discharge learning needs (meds, wound care, etc)  7/29/2025 1453 by Fidelina Newby, RN  Outcome: Progressing     Problem: Safety - Adult  Goal: Free from fall injury  7/30/2025 0038 by Siva Rodriguez RN  Outcome: Progressing  7/29/2025 1453 by Fidelina Newby RN  Outcome: Progressing     Problem: Skin/Tissue Integrity - Adult  Goal: Incisions, wounds, or drain sites healing without S/S of infection  7/30/2025 0038 by Siva Rodriguez RN  Outcome: Progressing  Flowsheets  Taken 7/30/2025 0037  Incisions, Wounds, or Drain Sites Healing Without Sign and Symptoms of Infection: TWICE DAILY: Assess and document dressing/incision, wound bed, drain sites and surrounding tissue  Taken 7/29/2025 1936  Incisions, Wounds, or Drain

## 2025-07-30 NOTE — PROGRESS NOTES
[facility]  Hypoglycemia Event and Prevention Plan      NAME: Tony Boyle  MEDICAL RECORD NUMBER:  [id number]  AGE: 69 y.o.   GENDER: male  : 1955  EPISODE DATE:  2025     Data     Recent Labs     25  0935 25  1140 25  1623 25  1937 25  0546 25  0618   POCGLU 89 140* 94 258* 58* 94       HgBA1c:    Lab Results   Component Value Date/Time    LABA1C 7.7 2025 04:35 AM       BUN/Creatinine:    Lab Results   Component Value Date/Time    BUN 10 2025 04:36 AM    CREATININE 0.6 2025 04:36 AM     GFR Estimated Creatinine Clearance: 148 mL/min (A) (based on SCr of 0.6 mg/dL (L)).    Medications  Scheduled Medications:   vancomycin  1,500 mg IntraVENous Q12H    sodium chloride flush  5-40 mL IntraVENous 2 times per day    enoxaparin  40 mg SubCUTAneous Daily    cefepime  2,000 mg IntraVENous Q8H    insulin lispro  0-8 Units SubCUTAneous 4x Daily AC & HS    vancomycin (VANCOCIN) intermittent dosing (placeholder)   Other RX Placeholder    amLODIPine  5 mg Oral Daily    clonazePAM  1 mg Oral Nightly    venlafaxine  75 mg Oral Daily with breakfast    lisinopril  20 mg Oral Daily    propranolol  40 mg Oral BID    rosuvastatin  20 mg Oral Daily    traZODone  200 mg Oral Nightly    insulin glargine  50 Units SubCUTAneous Nightly       Diet  Current diet/supplement order: ADULT DIET; Regular; 3 carb choices (45 gm/meal)     Recorded PO: PO Meals Eaten (%): 76 - 100% last meal in flowsheets      Action      Recommend BG targets 100 - 180.    Recommend reducing Lantus.    Consider adding prandial Humalog starting .    Physician Notified of event: Yes   Osman Lo MD    POCT added at 0200.      Responce     Achieved POCT Blood Glucose greater than 70 mg/dl: Yes      Medication plan updated: Yes      Electronically signed by Verito Contreras RN on 2025 at 8:41 AM

## 2025-07-30 NOTE — PROGRESS NOTES
[facility]  Diabetes Education   Progress Note       NAME:  Tony Boyle  MEDICAL RECORD NUMBER:  [id number]  AGE: 69 y.o.   GENDER: male  : 1955  TODAY'S DATE:  2025    Subjective   Reason for Diabetic Education Evaluation and Assessment: hypoglycemia     [place] is up in the chair.      He notes that his blood sugars are lower because he is eating less in the hospital.      Visit Type: evaluation      Tony Boyle is a 69 y.o. male referred by:     [] Physician  [] Nursing  [x] Chart Review   [] Other:     PAST MEDICAL HISTORY        Diagnosis Date    Depression     Diabetes mellitus (HCC)     GERD (gastroesophageal reflux disease)     Hyperlipidemia     Hypertension     LBBB (left bundle branch block)     SVT (supraventricular tachycardia)        PAST SURGICAL HISTORY    Past Surgical History:   Procedure Laterality Date    COLONOSCOPY      COLONOSCOPY N/A 3/1/2024    COLONOSCOPY POLYPECTOMY SNARE/COLD BIOPSY performed by Ambrocio Coffey MD at [facility] ENDOSCOPY    ENDOSCOPY, COLON, DIAGNOSTIC      EYE SURGERY Right 2023    PHACOEMULSIFICATION WITH CLAREON PANOPTIX TORIC INTRAOCULAR LENS IMPLANT - RIGHT EYE performed by Luiz Mancuso MD at [facility]    EYE SURGERY Left 05/15/2023    PHACOEMULSIFICATION WITH CLAREON PANOPTIX TORIC INTRAOCULAR LENS IMPLANT - LEFT EYE performed by Luiz Mancuso MD at [facility]    INSERTABLE CARDIAC MONITOR      out    KNEE SURGERY Right     meninscus repair    PACEMAKER INSERTION      had 8 seconds heart stop    TOE AMPUTATION Left 10/16/2024    PARTIAL HALLUX AMPUTATION LEFT FOOT performed by Jimbo Bruno DPCORDELL at [facility] OR    TOE AMPUTATION Left 10/30/2024    LEFT GREAT TOE AMPUTATION AT METATARSAL JOINT performed by Jimbo Bruno DPM at [facility] OR    TOE AMPUTATION Left 2025    AMPUTATION LEFT THIRD AND SECOND TOE performed by Jimbo Bruno DPM at [facility] OR    TONSILLECTOMY      UPPER GASTROINTESTINAL ENDOSCOPY N/A 3/1/2024     ESOPHAGOGASTRODUODENOSCOPY DILATATION performed by Ambrocio Coffey MD at [facility] ENDOSCOPY       FAMILY HISTORY    Family History   Problem Relation Age of Onset    Heart Disease Mother     Other Mother         pulmonary emboli    Diabetes Father        SOCIAL HISTORY    Social History     Tobacco Use    Smoking status: Former     Types: Cigarettes    Smokeless tobacco: Never    Tobacco comments:     Quit age 16 smoked for 2 years    Vaping Use    Vaping status: Never Used   Substance Use Topics    Alcohol use: Yes     Comment: social    Drug use: No       ALLERGIES    No Known Allergies    MEDICATIONS     insulin glargine  35 Units SubCUTAneous Nightly    metroNIDAZOLE  500 mg IntraVENous Q8H    vancomycin  1,500 mg IntraVENous Q12H    sodium chloride flush  5-40 mL IntraVENous 2 times per day    enoxaparin  40 mg SubCUTAneous Daily    cefepime  2,000 mg IntraVENous Q8H    insulin lispro  0-8 Units SubCUTAneous 4x Daily AC & HS    vancomycin (VANCOCIN) intermittent dosing (placeholder)   Other RX Placeholder    amLODIPine  5 mg Oral Daily    clonazePAM  1 mg Oral Nightly    venlafaxine  75 mg Oral Daily with breakfast    lisinopril  20 mg Oral Daily    propranolol  40 mg Oral BID    rosuvastatin  20 mg Oral Daily    traZODone  200 mg Oral Nightly       Objective        Patient Active Problem List   Diagnosis Code    Essential hypertension I10    Dyslipidemia E78.5    Type 2 diabetes mellitus with complication, with long-term current use of insulin ([facility]) E11.8, Z79.4    LBBB (left bundle branch block) I44.7    Cardiomyopathy ([facility]) I42.9    Diabetic ulcer of left great toe ([facility]) E11.621, L97.529    Infection requiring contact isolation precautions B99.9    Cellulitis of left foot L03.116    Multiple drug resistant organism (MDRO) culture positive Z16.24    Infection caused by Enterobacter cloacae A49.8    Type 2 diabetes mellitus with diabetic neuropathy, with long-term current use of insulin ([facility]) E11.40, Z79.4

## 2025-07-30 NOTE — PLAN OF CARE
Problem: Chronic Conditions and Co-morbidities  Goal: Patient's chronic conditions and co-morbidity symptoms are monitored and maintained or improved  7/30/2025 0853 by Marcelino Velasquez  Outcome: Progressing  7/30/2025 0038 by Siva Rodriguez, RN  Outcome: Progressing  Flowsheets (Taken 7/29/2025 1936)  Care Plan - Patient's Chronic Conditions and Co-Morbidity Symptoms are Monitored and Maintained or Improved:   Monitor and assess patient's chronic conditions and comorbid symptoms for stability, deterioration, or improvement   Collaborate with multidisciplinary team to address chronic and comorbid conditions and prevent exacerbation or deterioration   Update acute care plan with appropriate goals if chronic or comorbid symptoms are exacerbated and prevent overall improvement and discharge     Problem: Discharge Planning  Goal: Discharge to home or other facility with appropriate resources  7/30/2025 0853 by Marcelino Velasquez  Outcome: Progressing  7/30/2025 0038 by Siva Rodriguez, RN  Outcome: Progressing  Flowsheets (Taken 7/29/2025 1936)  Discharge to home or other facility with appropriate resources:   Identify barriers to discharge with patient and caregiver   Arrange for needed discharge resources and transportation as appropriate   Identify discharge learning needs (meds, wound care, etc)     Problem: Safety - Adult  Goal: Free from fall injury  7/30/2025 0853 by Marcelino Velasquez  Outcome: Progressing  7/30/2025 0038 by Siva Rodriguez, RN  Outcome: Progressing     Problem: Skin/Tissue Integrity - Adult  Goal: Incisions, wounds, or drain sites healing without S/S of infection  7/30/2025 0853 by Marcelino Velasquez  Outcome: Progressing  7/30/2025 0038 by Siva Rodriguez, RN  Outcome: Progressing  Flowsheets  Taken 7/30/2025 0037  Incisions, Wounds, or Drain Sites Healing Without Sign and Symptoms of Infection: TWICE DAILY: Assess and document dressing/incision, wound bed, drain sites

## 2025-07-30 NOTE — PROGRESS NOTES
[facility] - Occupational Therapy   Phone: [phone]    Occupational Therapy  Unit:[facility] 4N MED SURG    [x] Initial Evaluation            [] Daily Treatment Note         [x] Discharge Summary      Patient: Tony Boyle   : 1955   MRN: [id number]   Date of Service:  2025    Staff Mobility Recommendation: supervision with RW, heel weight bearing LLE    AM-PAC Score:   Discharge Recommendations: Tony Boyle scored a  on the AM-[facility] ADL Inpatient form.  At this time, no further OT is recommended upon discharge due to patient at/near independent level, no further acute needs identified.  Recommend patient returns to prior setting with prior services.      DME Required For Discharge: No DME required - pt has all DME for d/c home.    Admitting Diagnosis:  Osteomyelitis (HCC)  Ordering Physician: Carlos Koch DPM   Current Admission Summary: Pt is a 70 yo M admitted with osteomyelitis s/p amputation of second and third toe, L foot 25. Now heel weight bearing LLE.     Past Medical History:  has a past medical history of Depression, Diabetes mellitus (HCC), GERD (gastroesophageal reflux disease), Hyperlipidemia, Hypertension, LBBB (left bundle branch block), and SVT (supraventricular tachycardia).  Past Surgical History:  has a past surgical history that includes Tonsillectomy; knee surgery (Right); Pacemaker insertion; Eye surgery (Right, 2023); Insertable Cardiac Monitor; Eye surgery (Left, 05/15/2023); Colonoscopy; Endoscopy, colon, diagnostic; Upper gastrointestinal endoscopy (N/A, 3/1/2024); Colonoscopy (N/A, 3/1/2024); Toe amputation (Left, 10/16/2024); Toe amputation (Left, 10/30/2024); and Toe amputation (Left, 2025).    Assessment  Activity Tolerance: Good  Impairments Requiring Therapeutic Intervention: none - eval with same day discharge  Prognosis: good      Clinical Assessment: Patient presenting at/near independent level for completion of required self

## 2025-07-30 NOTE — PROGRESS NOTES
Value Ref Range    POC Glucose 94 70 - 99 mg/dl    Performed on ACCU-CHEK    POCT Glucose    Collection Time: 07/29/25  7:37 PM   Result Value Ref Range    POC Glucose 258 (H) 70 - 99 mg/dl    Performed on ACCU-CHEK    CBC with Auto Differential    Collection Time: 07/30/25  4:36 AM   Result Value Ref Range    WBC 6.9 4.0 - 11.0 K/uL    RBC 4.31 4.20 - 5.90 M/uL    Hemoglobin 12.4 (L) 13.5 - 17.5 g/dL    Hematocrit 37.4 (L) 40.5 - 52.5 %    MCV 86.8 80.0 - 100.0 fL    MCH 28.7 26.0 - 34.0 pg    MCHC 33.1 31.0 - 36.0 g/dL    RDW 16.4 (H) 12.4 - 15.4 %    Platelets 240 135 - 450 K/uL    MPV 7.9 5.0 - 10.5 fL    Neutrophils % 66.1 %    Lymphocytes % 17.1 %    Monocytes % 14.0 %    Eosinophils % 1.8 %    Basophils % 1.0 %    Neutrophils Absolute 4.5 1.7 - 7.7 K/uL    Lymphocytes Absolute 1.2 1.0 - 5.1 K/uL    Monocytes Absolute 1.0 0.0 - 1.3 K/uL    Eosinophils Absolute 0.1 0.0 - 0.6 K/uL    Basophils Absolute 0.1 0.0 - 0.2 K/uL   Basic Metabolic Panel    Collection Time: 07/30/25  4:36 AM   Result Value Ref Range    Sodium 140 136 - 145 mmol/L    Potassium 3.9 3.5 - 5.1 mmol/L    Chloride 104 99 - 110 mmol/L    CO2 28 21 - 32 mmol/L    Anion Gap 8 3 - 16    Glucose 52 (L) 70 - 99 mg/dL    BUN 10 7 - 20 mg/dL    Creatinine 0.6 (L) 0.8 - 1.3 mg/dL    Est, Glom Filt Rate >90 >60    Calcium 8.5 8.3 - 10.6 mg/dL   POCT Glucose    Collection Time: 07/30/25  5:46 AM   Result Value Ref Range    POC Glucose 58 (L) 70 - 99 mg/dl    Performed on ACCU-CHEK    POCT Glucose    Collection Time: 07/30/25  6:18 AM   Result Value Ref Range    POC Glucose 94 70 - 99 mg/dl    Performed on ACCU-CHEK    POCT Glucose    Collection Time: 07/30/25 11:46 AM   Result Value Ref Range    POC Glucose 109 (H) 70 - 99 mg/dl    Performed on ACCU-CHEK         Imaging/Diagnostics    XR FOOT LEFT (MIN 3 VIEWS)  Result Date: 7/29/2025  EXAM: 3 VIEW(S) XRAY OF THE LEFT FOOT 07/29/2025 09:31:06 AM COMPARISON: Comparison study dated 10/30/2023. CLINICAL  HISTORY: Status post left 2nd and 3rd digit amputation. FINDINGS: BONES AND JOINTS: Interval second and third digit phalangeal amputation. The previously seen first digit amputation is again noted, but now the distal first metatarsal has cortical irregularity at its distal aspect, suspicious for developing osteomyelitis in this region. SOFT TISSUES: The soft tissues are unremarkable.     1. Interval second and third digit phalangeal amputation. 2. Distal first metatarsal cortical irregularity, suspicious for developing osteomyelitis.       Assessment and Plan:     Tony Boyle is a 69 y.o. male     Conditions under treatment:    # Osteomyelitis of foot  # Left foot cellulitis  # Diabetes mellitus  # Hypertension  # Peripheral neuropathy    Plan:    -Patient with osteomyelitis of the third toe, seen by podiatry, had amputation of 2nd and 3rd toe, awaiting to see if he had clear margins on bone pathology, may not need long-term antibiotics I have consulted infectious disease for further recommendations.    -Presuming cellulitis is better, dressing in place which I did not take down, podiatry following    -Blood sugars actually towards the lower side, insulin dose decreased, discussed with diabetic educator    -Continue medications for hypertension    Personally reviewed Lab Studies and Imaging     Electronically signed by Osman Lo MD on 7/30/2025 at 1:53 PM    This not was generated completely or in part using Dragon, speech recognition software, please excuse any inaccuracies or misstatements.

## 2025-07-30 NOTE — CONSULTS
Infectious Diseases Inpatient Consult Note      Reason for Consult: Complicated left diabetic foot infection with gangrene    Requesting Physician:  Dr. Lo     Primary Care Physician:  Hebert Bolivar MD    History Obtained From:  [facility] AND PATIENT    CHIEF COMPLAINT: Left foot infection with osteomyelitis    HISTORY OF PRESENT ILLNESS:  69 y.o. male with significant history for diabetes, neuropathy, hypertension, hyperlipidemia, previous foot infection requiring surgery and hallux amputation now admitted to hospital secondary to left third digit turning black with eschar formation.  Ongoing fever Tmax 101.1, creatinine 0.7 CRP 71.5 albumin 3.1 WBC normal hemoglobin 12.3 hemoglobin A1c 7.7, wound culture in process, x-ray left foot interval 2nd and 3rd digit phalangeal amputation distal first metatarsal cortical irregularity concern for developing osteomyelitis. He was taken to OR on  7/29/25 and underwent amputation of the Left 2nd and 3rd toe. OR cx in process we are consulted for IV abx.        Past Medical History:    Past Medical History:   Diagnosis Date    Depression     Diabetes mellitus (HCC)     GERD (gastroesophageal reflux disease)     Hyperlipidemia     Hypertension     LBBB (left bundle branch block)     SVT (supraventricular tachycardia)        Past Surgical History:    Past Surgical History:   Procedure Laterality Date    COLONOSCOPY      COLONOSCOPY N/A 3/1/2024    COLONOSCOPY POLYPECTOMY SNARE/COLD BIOPSY performed by Ambrocio Coffey MD at [facility] ENDOSCOPY    ENDOSCOPY, COLON, DIAGNOSTIC      EYE SURGERY Right 05/01/2023    PHACOEMULSIFICATION WITH CLAREON PANOPTIX TORIC INTRAOCULAR LENS IMPLANT - RIGHT EYE performed by Luiz Mancuso MD at [facility]    EYE SURGERY Left 05/15/2023    PHACOEMULSIFICATION WITH CLAREON PANOPTIX TORIC INTRAOCULAR LENS IMPLANT - LEFT EYE performed by Luiz Mancuso MD at [facility]    INSERTABLE CARDIAC MONITOR      out    KNEE SURGERY Right     meninscus  use of insulin ([facility]) E11.8, Z79.4    LBBB (left bundle branch block) I44.7    Cardiomyopathy ([facility]) I42.9    Diabetic ulcer of left great toe ([facility]) E11.621, L97.529    Infection requiring contact isolation precautions B99.9    Cellulitis of left foot L03.116    Multiple drug resistant organism (MDRO) culture positive Z16.24    Infection caused by Enterobacter cloacae A49.8    Type 2 diabetes mellitus with diabetic neuropathy, with long-term current use of insulin ([facility]) E11.40, Z79.4    Pacemaker Z95.0    Morbid obesity due to excess calories ([facility]) E66.01    Ulcer of toe due to diabetes mellitus ([facility]) E11.621, L97.509    Osteomyelitis of fifth toe of left foot ([facility]) M86.9    Cellulitis of fifth toe of left foot L03.032    Class 2 obesity due to excess calories with body mass index (BMI) of 35.0 to 35.9 in adult E66.812, E66.09, Z68.35    Gastroesophageal reflux disease without esophagitis K21.9    CRP elevated R79.82    Elevated sed rate R70.0    Type 2 diabetes mellitus with left diabetic foot infection ([facility]) E11.628, L08.9    Receiving intravenous antibiotic treatment as outpatient Z79.2    Toe osteomyelitis, left ([facility]) M86.9    Osteomyelitis ([facility]) M86.9    Acute osteomyelitis of left foot ([facility]) M86.172        ICD-10-CM    1. Osteomyelitis of ankle or foot, left, acute ([facility])  M86.172 Culture, Surgical     Culture, Surgical     Surgical Pathology     Surgical Pathology         Acute osteomyelitis of left foot ([facility]) [M86.172]  Osteomyelitis ([facility]) [M86.9]    Complicated left diabetic foot infection  Fever  Chills  CRP elevation  Left third digit gangrene changes  Diabetic with neuropathy  Diabetes hemoglobin A1c 7.7  AICD in place  Previous diabetic foot infection resulting in toe amputation  Left foot cellulitis  Left foot concern for osteomyelitis  Arterial duplex scan in January 2025 -no occlusion noted  S/p Left 2nd and 3rd toe amputation on 7/29/25  Bone path clear  OR cx in process     Given the complicated

## 2025-07-30 NOTE — DISCHARGE INSTRUCTIONS
[employer] Customer Service: [phone]    Call if your sensor falls off, or if you have the following tests (MRI or CT Scan). [employer] will validate your name, date of birth, address, and validate you have an active order. If you contact [employer] for sensor issues, please have the serial number available. The serial number can be found on the box that the sensor was taken from or also on the jonathan that you monitor your blood glucoses. Customer Support can help you locate this number if you need it regarding a replacement- this will allow replacement without issues. Replacements take 3-5 business days to receive replacement sensor

## 2025-07-30 NOTE — PROGRESS NOTES
Blood sugar dropped to 58 this am. Several cartons of apple juice given and blood sugar increased to 94. Left foot dressing C/D/I . Received Percocet overnight which was effective in relieving pain. Continues on Cefepime and Vancomycin . Was febrile at beginning of the shift but received Tylenol and has been afebrile since.

## 2025-07-30 NOTE — CARE COORDINATION
Chart review done, rounds completed. Likely another couple days. Here for CHF exacerbation, pending clearance.     Recent amputation. Pt/OT rec home.     Paco Euceda LMSW, [facility] Social Work Case Management   Phone: [phone]  Fax: [phone]

## 2025-07-31 PROBLEM — L03.119 CELLULITIS OF FOOT: Status: ACTIVE | Noted: 2025-07-31

## 2025-07-31 PROBLEM — I96 GANGRENE OF TOE OF LEFT FOOT (HCC): Status: ACTIVE | Noted: 2025-07-31

## 2025-07-31 PROBLEM — E11.42 DIABETIC POLYNEUROPATHY ASSOCIATED WITH TYPE 2 DIABETES MELLITUS (HCC): Status: ACTIVE | Noted: 2025-07-31

## 2025-07-31 PROBLEM — L08.9 DIABETIC FOOT INFECTION (HCC): Status: ACTIVE | Noted: 2025-07-31

## 2025-07-31 PROBLEM — E11.628 DIABETIC FOOT INFECTION (HCC): Status: ACTIVE | Noted: 2025-07-31

## 2025-07-31 PROBLEM — M86.172 OSTEOMYELITIS OF ANKLE OR FOOT, LEFT, ACUTE (HCC): Status: ACTIVE | Noted: 2025-07-31

## 2025-07-31 LAB
CRP SERPL-MCNC: 112 MG/L (ref 0–5.1)
ERYTHROCYTE [SEDIMENTATION RATE] IN BLOOD BY WESTERGREN METHOD: 95 MM/HR (ref 0–20)
GLUCOSE BLD-MCNC: 120 MG/DL (ref 70–99)
GLUCOSE BLD-MCNC: 125 MG/DL (ref 70–99)
GLUCOSE BLD-MCNC: 136 MG/DL (ref 70–99)
GLUCOSE BLD-MCNC: 151 MG/DL (ref 70–99)
GLUCOSE BLD-MCNC: 61 MG/DL (ref 70–99)
PERFORMED ON: ABNORMAL
VANCOMYCIN SERPL-MCNC: 15.4 UG/ML

## 2025-07-31 PROCEDURE — 80202 ASSAY OF VANCOMYCIN: CPT

## 2025-07-31 PROCEDURE — 6360000002 HC RX W HCPCS: Performed by: HOSPITALIST

## 2025-07-31 PROCEDURE — 2500000003 HC RX 250 WO HCPCS

## 2025-07-31 PROCEDURE — 6360000002 HC RX W HCPCS: Performed by: INTERNAL MEDICINE

## 2025-07-31 PROCEDURE — 6360000002 HC RX W HCPCS

## 2025-07-31 PROCEDURE — 2580000003 HC RX 258: Performed by: HOSPITALIST

## 2025-07-31 PROCEDURE — 86140 C-REACTIVE PROTEIN: CPT

## 2025-07-31 PROCEDURE — 1200000000 HC SEMI PRIVATE

## 2025-07-31 PROCEDURE — 36415 COLL VENOUS BLD VENIPUNCTURE: CPT

## 2025-07-31 PROCEDURE — 2580000003 HC RX 258

## 2025-07-31 PROCEDURE — 99233 SBSQ HOSP IP/OBS HIGH 50: CPT | Performed by: INTERNAL MEDICINE

## 2025-07-31 PROCEDURE — 85652 RBC SED RATE AUTOMATED: CPT

## 2025-07-31 PROCEDURE — 94760 N-INVAS EAR/PLS OXIMETRY 1: CPT

## 2025-07-31 PROCEDURE — 6370000000 HC RX 637 (ALT 250 FOR IP)

## 2025-07-31 PROCEDURE — G0545 PR INHERENT VISIT TO INPT: HCPCS | Performed by: INTERNAL MEDICINE

## 2025-07-31 RX ORDER — SODIUM CHLORIDE 0.9 % (FLUSH) 0.9 %
5-40 SYRINGE (ML) INJECTION EVERY 12 HOURS SCHEDULED
Status: DISCONTINUED | OUTPATIENT
Start: 2025-07-31 | End: 2025-08-01 | Stop reason: SDUPTHER

## 2025-07-31 RX ORDER — SODIUM CHLORIDE 0.9 % (FLUSH) 0.9 %
5-40 SYRINGE (ML) INJECTION PRN
Status: DISCONTINUED | OUTPATIENT
Start: 2025-07-31 | End: 2025-08-01 | Stop reason: SDUPTHER

## 2025-07-31 RX ORDER — INSULIN GLARGINE 100 [IU]/ML
30 INJECTION, SOLUTION SUBCUTANEOUS DAILY
Status: DISCONTINUED | OUTPATIENT
Start: 2025-08-01 | End: 2025-08-01 | Stop reason: HOSPADM

## 2025-07-31 RX ORDER — LIDOCAINE HYDROCHLORIDE 10 MG/ML
50 INJECTION, SOLUTION EPIDURAL; INFILTRATION; INTRACAUDAL; PERINEURAL ONCE
Status: DISCONTINUED | OUTPATIENT
Start: 2025-08-01 | End: 2025-08-01 | Stop reason: HOSPADM

## 2025-07-31 RX ORDER — SODIUM CHLORIDE 9 MG/ML
INJECTION, SOLUTION INTRAVENOUS PRN
Status: DISCONTINUED | OUTPATIENT
Start: 2025-07-31 | End: 2025-08-01 | Stop reason: HOSPADM

## 2025-07-31 RX ADMIN — METRONIDAZOLE 500 MG: 500 INJECTION, SOLUTION INTRAVENOUS at 12:12

## 2025-07-31 RX ADMIN — TRAZODONE HYDROCHLORIDE 200 MG: 100 TABLET ORAL at 22:14

## 2025-07-31 RX ADMIN — PROPRANOLOL HYDROCHLORIDE 40 MG: 40 TABLET ORAL at 08:33

## 2025-07-31 RX ADMIN — CEFEPIME 2000 MG: 2 INJECTION, POWDER, FOR SOLUTION INTRAVENOUS at 05:17

## 2025-07-31 RX ADMIN — METRONIDAZOLE 500 MG: 500 INJECTION, SOLUTION INTRAVENOUS at 22:19

## 2025-07-31 RX ADMIN — VANCOMYCIN HYDROCHLORIDE 1500 MG: 1.5 INJECTION, POWDER, LYOPHILIZED, FOR SOLUTION INTRAVENOUS at 12:12

## 2025-07-31 RX ADMIN — ENOXAPARIN SODIUM 40 MG: 100 INJECTION SUBCUTANEOUS at 08:33

## 2025-07-31 RX ADMIN — ROSUVASTATIN CALCIUM 20 MG: 20 TABLET, FILM COATED ORAL at 08:33

## 2025-07-31 RX ADMIN — CLONAZEPAM 1 MG: 1 TABLET ORAL at 22:15

## 2025-07-31 RX ADMIN — AMLODIPINE BESYLATE 5 MG: 5 TABLET ORAL at 08:33

## 2025-07-31 RX ADMIN — METRONIDAZOLE 500 MG: 500 INJECTION, SOLUTION INTRAVENOUS at 04:04

## 2025-07-31 RX ADMIN — LISINOPRIL 20 MG: 20 TABLET ORAL at 08:33

## 2025-07-31 RX ADMIN — VENLAFAXINE HYDROCHLORIDE 75 MG: 75 CAPSULE, EXTENDED RELEASE ORAL at 08:33

## 2025-07-31 RX ADMIN — CEFEPIME 2000 MG: 2 INJECTION, POWDER, FOR SOLUTION INTRAVENOUS at 23:56

## 2025-07-31 RX ADMIN — CEFEPIME 2000 MG: 2 INJECTION, POWDER, FOR SOLUTION INTRAVENOUS at 13:52

## 2025-07-31 RX ADMIN — PROPRANOLOL HYDROCHLORIDE 40 MG: 40 TABLET ORAL at 22:14

## 2025-07-31 RX ADMIN — VANCOMYCIN HYDROCHLORIDE 1500 MG: 1.5 INJECTION, POWDER, LYOPHILIZED, FOR SOLUTION INTRAVENOUS at 02:20

## 2025-07-31 RX ADMIN — Medication 10 ML: at 22:14

## 2025-07-31 ASSESSMENT — PAIN SCALES - GENERAL: PAINLEVEL_OUTOF10: 0

## 2025-07-31 NOTE — PROGRESS NOTES
Clinical Pharmacy Note  Vancomycin Consult    Tony Boyle is a 69 y.o. male ordered vancomycin for skin/soft tissue infection; consult received from Dr. Lo to manage therapy. Also receiving cefepime.    Allergies:  Patient has no known allergies.     Temp max:  Temp (24hrs), Av.2 °F (36.8 °C), Min:98.2 °F (36.8 °C), Max:98.2 °F (36.8 °C)      Recent Labs     25  0435 25  0436   WBC 7.5 6.9       Recent Labs     25  0435 25  043   BUN 10 10   CREATININE 0.6* 0.6*         Intake/Output Summary (Last 24 hours) at 2025 1326  Last data filed at 2025 1134  Gross per 24 hour   Intake 840 ml   Output 2250 ml   Net -1410 ml       Culture Results:  Foot: + Osteomyelitis   Gram Stain Result No WBCs or organisms seen  No Epithelial Cells seen   Anaerobic Culture No anaerobes isolated so far, Further report to follow P   Organism Staphylococcus coagulase-negative Abnormal    Culture Surgical Rare growth  No further workup   Organism Corynebacterium species Abnormal    Culture Surgical Rare growth  Further identified as Corynebacterium aurimucosum  No further workup   Organism Streptococcus anginosus Abnormal    Culture Surgical Rare growth  No further workup       Ht Readings from Last 1 Encounters:   25 1.829 m (6')        Wt Readings from Last 1 Encounters:   25 114.4 kg (252 lb 3.3 oz)         Estimated Creatinine Clearance: 147 mL/min (A) (based on SCr of 0.6 mg/dL (L)).    Assessment:  Day # 4 of vancomycin.  Current regimen: 1500 mg every 12 hours  Vancomycin level: 15.4  mg/L  Predicted AUC: 475 (24-48H) 471 (24,SS)    Plan:  Continue current regimen.      Thank you for the consult.   Luz Maria William RPH   2025 1:26 PM

## 2025-07-31 NOTE — CARE COORDINATION
Chart review done, rounds completed. Likely another couple days. Here for CHF exacerbation, pending clearance.      Recent amputation. Pt/OT rec home. However, per ID, likely [facility]AB[facility].      Paco Euceda LMSW, [facility] Social Work Case Management   Phone: [phone]  Fax: [phone]

## 2025-07-31 NOTE — PLAN OF CARE
Problem: Chronic Conditions and Co-morbidities  Goal: Patient's chronic conditions and co-morbidity symptoms are monitored and maintained or improved  Outcome: Progressing  Flowsheets (Taken 7/30/2025 2000)  Care Plan - Patient's Chronic Conditions and Co-Morbidity Symptoms are Monitored and Maintained or Improved:   Monitor and assess patient's chronic conditions and comorbid symptoms for stability, deterioration, or improvement   Collaborate with multidisciplinary team to address chronic and comorbid conditions and prevent exacerbation or deterioration   Update acute care plan with appropriate goals if chronic or comorbid symptoms are exacerbated and prevent overall improvement and discharge     Problem: Discharge Planning  Goal: Discharge to home or other facility with appropriate resources  Outcome: Progressing  Flowsheets (Taken 7/30/2025 2000)  Discharge to home or other facility with appropriate resources:   Identify barriers to discharge with patient and caregiver   Arrange for needed discharge resources and transportation as appropriate   Identify discharge learning needs (meds, wound care, etc)     Problem: Safety - Adult  Goal: Free from fall injury  Outcome: Progressing     Problem: Skin/Tissue Integrity - Adult  Goal: Incisions, wounds, or drain sites healing without S/S of infection  Outcome: Progressing  Flowsheets  Taken 7/31/2025 0328  Incisions, Wounds, or Drain Sites Healing Without Sign and Symptoms of Infection: TWICE DAILY: Assess and document dressing/incision, wound bed, drain sites and surrounding tissue  Taken 7/30/2025 2000  Incisions, Wounds, or Drain Sites Healing Without Sign and Symptoms of Infection: TWICE DAILY: Assess and document dressing/incision, wound bed, drain sites and surrounding tissue     Problem: Metabolic/Fluid and Electrolytes - Adult  Goal: Glucose maintained within prescribed range  Outcome: Progressing  Flowsheets (Taken 7/30/2025 2000)  Glucose maintained within

## 2025-07-31 NOTE — PROGRESS NOTES
Infectious Diseases Inpatient Progress Note        CHIEF COMPLAINT:   Left foot infection with osteomyelitis  Left third toe gangrene  Diabetic neuropathy  Recurrent diabetic foot infection  Status post left 2nd and 3rd toe amputations  AICD in place  CRP elevation      HISTORY OF PRESENT ILLNESS:  69 y.o. male with significant history for diabetes, neuropathy, hypertension, hyperlipidemia, previous foot infection requiring surgery and hallux amputation now admitted to hospital secondary to left third digit turning black with eschar formation.  Ongoing fever Tmax 101.1, creatinine 0.7 CRP 71.5 albumin 3.1 WBC normal hemoglobin 12.3 hemoglobin A1c 7.7, wound culture in process, x-ray left foot interval 2nd and 3rd digit phalangeal amputation distal first metatarsal cortical irregularity concern for developing osteomyelitis. He was taken to OR on  7/29/25 and underwent amputation of the Left 2nd and 3rd toe. OR cx in process we are consulted for IV abx.    Interval history: Ongoing left leg swelling left.  Left foot dressing present, , operative culture Corynebacterium and Streptococcus anginosus    Past Medical History:    Past Medical History:   Diagnosis Date    Depression     Diabetes mellitus (HCC)     GERD (gastroesophageal reflux disease)     Hyperlipidemia     Hypertension     LBBB (left bundle branch block)     SVT (supraventricular tachycardia)        Past Surgical History:    Past Surgical History:   Procedure Laterality Date    COLONOSCOPY      COLONOSCOPY N/A 3/1/2024    COLONOSCOPY POLYPECTOMY SNARE/COLD BIOPSY performed by Ambrocio Coffey MD at [facility] ENDOSCOPY    ENDOSCOPY, COLON, DIAGNOSTIC      EYE SURGERY Right 05/01/2023    PHACOEMULSIFICATION WITH CLAREON PANOPTIX TORIC INTRAOCULAR LENS IMPLANT - RIGHT EYE performed by Luiz Mancuso MD at [facility]    EYE SURGERY Left 05/15/2023    PHACOEMULSIFICATION WITH CLAREON PANOPTIX TORIC INTRAOCULAR LENS IMPLANT - LEFT EYE performed by Luiz  Hide Additional Notes?: No Detail Level: Zone Gastroesophageal reflux disease without esophagitis K21.9    CRP elevated R79.82    Elevated sed rate R70.0    Type 2 diabetes mellitus with left diabetic foot infection ([facility]) E11.628, L08.9    Receiving intravenous antibiotic treatment as outpatient Z79.2    Toe osteomyelitis, left ([facility]) M86.9    Osteomyelitis ([facility]) M86.9    Acute osteomyelitis of left foot ([facility]) M86.172    Diabetic polyneuropathy associated with type 2 diabetes mellitus ([facility]) E11.42    Gangrene of toe of left foot ([facility]) I96    BMI 34.0-34.9,adult Z68.34    Cellulitis of foot L03.119    Diabetic foot infection ([facility]) E11.628, L08.9    Osteomyelitis of ankle or foot, left, acute ([facility]) M86.172        ICD-10-CM    1. Osteomyelitis of ankle or foot, left, acute ([facility])  M86.172 Culture, Surgical     Culture, Surgical     Surgical Pathology     Surgical Pathology         Acute osteomyelitis of left foot ([facility]) [M86.172]  Osteomyelitis ([facility]) [M86.9]    Complicated left diabetic foot infection  Fever  Chills  CRP elevation  Left third digit gangrene changes  Diabetic with neuropathy  Diabetes hemoglobin A1c 7.7  AICD in place  Previous diabetic foot infection resulting in toe amputation  Left foot cellulitis  Left foot concern for osteomyelitis  Arterial duplex scan in January 2025 -no occlusion noted  S/p Left 2nd and 3rd toe amputation on 7/29/25  Bone path clear  OR cx in process     Given the complicated diabetic foot infection with left third toe osteomyelitis requiring amputation of the 2nd and 3rd toe will need IV antibiotic.  Outpatient by therapy discussed in detail will arrange PICC line once blood cultures are negative fevers resolved    Labs, Microbiology, Radiology and pertinent results from current hospitalization and care every where were reviewed by me as a part of the consultation.    PLAN :  Send blood cultures NGTD  Operative culture noted   Trend ESR 95   Trend   Continue IV vancomycin x 1500 mg Q 12 hr  Cont IV Cefepime x 2 gm Q 12  Detail Level: Detailed

## 2025-07-31 NOTE — PROGRESS NOTES
V2.0  [facility] Hospitalist Progress Note      Name:  Tony Boyle /Age/Sex: 1955  (69 y.o. male)   MRN & CSN:  [id number] & [id number] Encounter Date/Time: 2025 11:49 AM EDT    Location:  [id number] PCP: Hebert Bolivar MD       Hospital Day: 4    Subjective:   Chief Complaint: Follow-up toe osteomyelitis    Seen and evaluated at bedside, patient's blood sugar was low, reportedly patient takes his insulin in the morning at home, he will be giving it at nighttime, is alert no new issues    Review of Systems:    Review of Systems    10 point ROS negative except as stated above in \"subjective\" section    Objective:     Intake/Output Summary (Last 24 hours) at 2025 1149  Last data filed at 2025 1134  Gross per 24 hour   Intake 840 ml   Output 2250 ml   Net -1410 ml      Vitals:   Vitals:    25 1013   BP:    Pulse: 84   Resp: 16   Temp:    SpO2: 93%     Physical Exam:   General: Awake, alert and  NAD  Cardiovascular: S1S2 present, regular rate and rhythm, no murmurs  Respiratory: Clear to auscultation  Gastrointestinal: Soft, non tender, positive bowel sounds   Genitourinary: no suprapubic tenderness  Musculoskeletal: Dressing over foot, podiatry following    Medications:     Medications:    insulin glargine  35 Units SubCUTAneous Nightly    metroNIDAZOLE  500 mg IntraVENous Q8H    vancomycin  1,500 mg IntraVENous Q12H    sodium chloride flush  5-40 mL IntraVENous 2 times per day    enoxaparin  40 mg SubCUTAneous Daily    cefepime  2,000 mg IntraVENous Q8H    insulin lispro  0-8 Units SubCUTAneous 4x Daily AC & HS    vancomycin (VANCOCIN) intermittent dosing (placeholder)   Other RX Placeholder    amLODIPine  5 mg Oral Daily    clonazePAM  1 mg Oral Nightly    venlafaxine  75 mg Oral Daily with breakfast    lisinopril  20 mg Oral Daily    propranolol  40 mg Oral BID    rosuvastatin  20 mg Oral Daily    traZODone  200 mg Oral Nightly      Infusions:    sodium chloride

## 2025-07-31 NOTE — PLAN OF CARE
Problem: Chronic Conditions and Co-morbidities  Goal: Patient's chronic conditions and co-morbidity symptoms are monitored and maintained or improved  7/31/2025 1437 by Alba Junior RN  Outcome: Progressing  7/31/2025 0328 by Siva Rodriguez RN  Outcome: Progressing  Flowsheets (Taken 7/30/2025 2000)  Care Plan - Patient's Chronic Conditions and Co-Morbidity Symptoms are Monitored and Maintained or Improved:   Monitor and assess patient's chronic conditions and comorbid symptoms for stability, deterioration, or improvement   Collaborate with multidisciplinary team to address chronic and comorbid conditions and prevent exacerbation or deterioration   Update acute care plan with appropriate goals if chronic or comorbid symptoms are exacerbated and prevent overall improvement and discharge     Problem: Discharge Planning  Goal: Discharge to home or other facility with appropriate resources  7/31/2025 1437 by Alba Junior RN  Outcome: Progressing  7/31/2025 0328 by Siva Rodriguez RN  Outcome: Progressing  Flowsheets (Taken 7/30/2025 2000)  Discharge to home or other facility with appropriate resources:   Identify barriers to discharge with patient and caregiver   Arrange for needed discharge resources and transportation as appropriate   Identify discharge learning needs (meds, wound care, etc)     Problem: Safety - Adult  Goal: Free from fall injury  7/31/2025 1437 by Alba Junior RN  Outcome: Progressing  7/31/2025 0328 by Siva Rodriguez RN  Outcome: Progressing     Problem: Skin/Tissue Integrity - Adult  Goal: Incisions, wounds, or drain sites healing without S/S of infection  7/31/2025 1437 by Alba Junior RN  Outcome: Progressing  7/31/2025 0328 by Siva Rodriguez RN  Outcome: Progressing  Flowsheets  Taken 7/31/2025 0328  Incisions, Wounds, or Drain Sites Healing Without Sign and Symptoms of Infection: TWICE DAILY: Assess and document dressing/incision, wound bed, drain sites and surrounding  Universal Safety Interventions

## 2025-07-31 NOTE — PROGRESS NOTES
[facility]  Hypoglycemia Event and Prevention Plan      NAME: Tony Boyle  MEDICAL RECORD NUMBER:  [id number]  AGE: 69 y.o.   GENDER: male  : 1955  EPISODE DATE:  2025     Data     Recent Labs     25  1146 25  1634 25  2041 25  0720 25  0747 25  1131   POCGLU 109* 193* 180* 61* 120* 136*       HgBA1c:    Lab Results   Component Value Date/Time    LABA1C 7.7 2025 04:35 AM       BUN/Creatinine:    Lab Results   Component Value Date/Time    BUN 10 2025 04:36 AM    CREATININE 0.6 2025 04:36 AM     GFR Estimated Creatinine Clearance: 147 mL/min (A) (based on SCr of 0.6 mg/dL (L)).    Medications  Scheduled Medications:   [START ON 2025] insulin glargine  30 Units SubCUTAneous Daily    NONFORMULARY   IntraVENous Once    metroNIDAZOLE  500 mg IntraVENous Q8H    vancomycin  1,500 mg IntraVENous Q12H    sodium chloride flush  5-40 mL IntraVENous 2 times per day    enoxaparin  40 mg SubCUTAneous Daily    cefepime  2,000 mg IntraVENous Q8H    insulin lispro  0-8 Units SubCUTAneous 4x Daily AC & HS    vancomycin (VANCOCIN) intermittent dosing (placeholder)   Other RX Placeholder    amLODIPine  5 mg Oral Daily    clonazePAM  1 mg Oral Nightly    venlafaxine  75 mg Oral Daily with breakfast    lisinopril  20 mg Oral Daily    propranolol  40 mg Oral BID    rosuvastatin  20 mg Oral Daily    traZODone  200 mg Oral Nightly       Diet  Current diet/supplement order: ADULT DIET; Regular; 3 carb choices (45 gm/meal)     Recorded PO: PO Meals Eaten (%): 76 - 100% last meal in flowsheets      Action      [place]enix expressed strong preference for his basal insulin to be scheduled every morning like he does at home.      Recommend BG targets 100 - 180.    Lantus already reduced.    Consider transition plan to basal insulin every morning.  (NPH 13 units this evening and then start Lantus 30 units in the AM)      POCT added at 0200.    Physician Notified of  event: Yes  Osman Lo MD      Electronically signed by Verito Contreras RN on 7/31/2025 at 1:28 PM

## 2025-07-31 NOTE — DISCHARGE INSTR - COC
Continuity of Care Form    Patient Name: Tony Boyle   :  1955  MRN:  [id number]    Admit date:  2025  Discharge date:  ***    Code Status Order: Full Code   Advance Directives:    Date/Time Healthcare Directive Type of Healthcare Directive Copy in Chart Healthcare Agent Appointed Healthcare Agent's Name Healthcare Agent's Phone Number    25 0628 No, patient does not have an advance directive for healthcare treatment  --  --  --  --  --             Admitting Physician:  Osman Lo MD  PCP: Hebert Bolivar MD    Discharging Nurse: ***  Discharging Hospital Unit/Room#: R1U-3290/4272-01  Discharging Unit Phone Number: ***    Emergency Contact:   Extended Emergency Contact Information  Primary Emergency Contact: Jhon,Sylvie  Mobile Phone: [phone]  Relation: Child  Secondary Emergency Contact: [place] [address] [facility]  Home Phone: [phone]  Mobile Phone: [phone]  Relation: Child    Past Surgical History:  Past Surgical History:   Procedure Laterality Date    COLONOSCOPY      COLONOSCOPY N/A 3/1/2024    COLONOSCOPY POLYPECTOMY SNARE/COLD BIOPSY performed by Ambrocio Coffey MD at [facility] ENDOSCOPY    ENDOSCOPY, COLON, DIAGNOSTIC      EYE SURGERY Right 2023    PHACOEMULSIFICATION WITH CLAREON PANOPTIX TORIC INTRAOCULAR LENS IMPLANT - RIGHT EYE performed by Luiz Mancuso MD at [facility]    EYE SURGERY Left 05/15/2023    PHACOEMULSIFICATION WITH CLAREON PANOPTIX TORIC INTRAOCULAR LENS IMPLANT - LEFT EYE performed by Luiz Mancuso MD at [facility]    INSERTABLE CARDIAC MONITOR      out    KNEE SURGERY Right     meninscus repair    PACEMAKER INSERTION      had 8 seconds heart stop    TOE AMPUTATION Left 10/16/2024    PARTIAL HALLUX AMPUTATION LEFT FOOT performed by Jimbo Bruno DPM at [facility] OR    TOE AMPUTATION Left 10/30/2024    LEFT GREAT TOE AMPUTATION AT METATARSAL JOINT performed by Jimbo Bruno DPM at [facility] OR    TOE

## 2025-08-01 ENCOUNTER — TELEPHONE (OUTPATIENT)
Dept: INFECTIOUS DISEASES | Age: 70
End: 2025-08-01

## 2025-08-01 VITALS
BODY MASS INDEX: 33.83 KG/M2 | DIASTOLIC BLOOD PRESSURE: 86 MMHG | HEART RATE: 77 BPM | TEMPERATURE: 97.3 F | HEIGHT: 72 IN | WEIGHT: 249.78 LBS | RESPIRATION RATE: 18 BRPM | SYSTOLIC BLOOD PRESSURE: 137 MMHG | OXYGEN SATURATION: 95 %

## 2025-08-01 LAB
BACTERIA SPEC AEROBE CULT: ABNORMAL
BACTERIA SPEC ANAEROBE CULT: ABNORMAL
CREAT SERPL-MCNC: 0.6 MG/DL (ref 0.8–1.3)
GFR SERPLBLD CREATININE-BSD FMLA CKD-EPI: >90 ML/MIN/{1.73_M2}
GLUCOSE BLD-MCNC: 130 MG/DL (ref 70–99)
GLUCOSE BLD-MCNC: 171 MG/DL (ref 70–99)
GLUCOSE BLD-MCNC: 65 MG/DL (ref 70–99)
GRAM STN SPEC: ABNORMAL
ORGANISM: ABNORMAL
PERFORMED ON: ABNORMAL

## 2025-08-01 PROCEDURE — 6370000000 HC RX 637 (ALT 250 FOR IP)

## 2025-08-01 PROCEDURE — 36569 INSJ PICC 5 YR+ W/O IMAGING: CPT

## 2025-08-01 PROCEDURE — 2580000003 HC RX 258

## 2025-08-01 PROCEDURE — 2580000003 HC RX 258: Performed by: INTERNAL MEDICINE

## 2025-08-01 PROCEDURE — 76937 US GUIDE VASCULAR ACCESS: CPT

## 2025-08-01 PROCEDURE — 94760 N-INVAS EAR/PLS OXIMETRY 1: CPT

## 2025-08-01 PROCEDURE — 6360000002 HC RX W HCPCS: Performed by: HOSPITALIST

## 2025-08-01 PROCEDURE — 36415 COLL VENOUS BLD VENIPUNCTURE: CPT

## 2025-08-01 PROCEDURE — 6360000002 HC RX W HCPCS

## 2025-08-01 PROCEDURE — C1751 CATH, INF, PER/CENT/MIDLINE: HCPCS

## 2025-08-01 PROCEDURE — 82565 ASSAY OF CREATININE: CPT

## 2025-08-01 PROCEDURE — 6370000000 HC RX 637 (ALT 250 FOR IP): Performed by: HOSPITALIST

## 2025-08-01 PROCEDURE — 6360000002 HC RX W HCPCS: Performed by: INTERNAL MEDICINE

## 2025-08-01 PROCEDURE — 2580000003 HC RX 258: Performed by: HOSPITALIST

## 2025-08-01 PROCEDURE — 02HV33Z INSERTION OF INFUSION DEVICE INTO SUPERIOR VENA CAVA, PERCUTANEOUS APPROACH: ICD-10-PCS | Performed by: PODIATRIST

## 2025-08-01 RX ORDER — ENOXAPARIN SODIUM 100 MG/ML
30 INJECTION SUBCUTANEOUS 2 TIMES DAILY
Status: DISCONTINUED | OUTPATIENT
Start: 2025-08-02 | End: 2025-08-01 | Stop reason: HOSPADM

## 2025-08-01 RX ADMIN — INSULIN GLARGINE 30 UNITS: 100 INJECTION, SOLUTION SUBCUTANEOUS at 08:40

## 2025-08-01 RX ADMIN — VENLAFAXINE HYDROCHLORIDE 75 MG: 75 CAPSULE, EXTENDED RELEASE ORAL at 08:38

## 2025-08-01 RX ADMIN — ERTAPENEM SODIUM 1000 MG: 1 INJECTION INTRAMUSCULAR; INTRAVENOUS at 14:25

## 2025-08-01 RX ADMIN — AMLODIPINE BESYLATE 5 MG: 5 TABLET ORAL at 08:38

## 2025-08-01 RX ADMIN — ENOXAPARIN SODIUM 40 MG: 100 INJECTION SUBCUTANEOUS at 08:40

## 2025-08-01 RX ADMIN — VANCOMYCIN HYDROCHLORIDE 1500 MG: 1.5 INJECTION, POWDER, LYOPHILIZED, FOR SOLUTION INTRAVENOUS at 00:06

## 2025-08-01 RX ADMIN — ROSUVASTATIN CALCIUM 20 MG: 20 TABLET, FILM COATED ORAL at 08:38

## 2025-08-01 RX ADMIN — PROPRANOLOL HYDROCHLORIDE 40 MG: 40 TABLET ORAL at 08:38

## 2025-08-01 RX ADMIN — METRONIDAZOLE 500 MG: 500 INJECTION, SOLUTION INTRAVENOUS at 05:36

## 2025-08-01 RX ADMIN — METRONIDAZOLE 500 MG: 500 INJECTION, SOLUTION INTRAVENOUS at 12:29

## 2025-08-01 RX ADMIN — CEFEPIME 2000 MG: 2 INJECTION, POWDER, FOR SOLUTION INTRAVENOUS at 06:39

## 2025-08-01 RX ADMIN — VANCOMYCIN HYDROCHLORIDE 1500 MG: 1.5 INJECTION, POWDER, LYOPHILIZED, FOR SOLUTION INTRAVENOUS at 12:30

## 2025-08-01 RX ADMIN — LISINOPRIL 20 MG: 20 TABLET ORAL at 08:38

## 2025-08-01 NOTE — PROGRESS NOTES
Department of Podiatry Consult Note  Jimbo Bruno DPM Attending     Reason for Consult:  Osteomyelitis of LEFT 3rd digits  Requesting Physician:  Osman Lo MD    CHIEF COMPLAINT:  Dark eschar of gangrene to LEFT 3rd digit    HISTORY OF PRESENT ILLNESS:                The patient is a 69 y.o. male with significant past medical history of Diabetes with peripheral neuropathy and previous amputation status who is consulted for newer onset of wound of LEFT 3rd digit during his recovery of hallux amputation same foot. Patient had radiographs at last appointment with full intact cortices, but now presents today with exposed distal phalanx of his LEFT 3rd digit    Past Medical History:        Diagnosis Date    Depression     Diabetes mellitus (HCC)     GERD (gastroesophageal reflux disease)     Hyperlipidemia     Hypertension     LBBB (left bundle branch block)     SVT (supraventricular tachycardia)      Past Surgical History:        Procedure Laterality Date    COLONOSCOPY      COLONOSCOPY N/A 3/1/2024    COLONOSCOPY POLYPECTOMY SNARE/COLD BIOPSY performed by Ambrocio Coffey MD at [facility] ENDOSCOPY    ENDOSCOPY, COLON, DIAGNOSTIC      EYE SURGERY Right 05/01/2023    PHACOEMULSIFICATION WITH CLAREON PANOPTIX TORIC INTRAOCULAR LENS IMPLANT - RIGHT EYE performed by Luiz Mancuso MD at [facility]    EYE SURGERY Left 05/15/2023    PHACOEMULSIFICATION WITH CLAREON PANOPTIX TORIC INTRAOCULAR LENS IMPLANT - LEFT EYE performed by Luiz Mancuso MD at [facility]    INSERTABLE CARDIAC MONITOR      out    KNEE SURGERY Right     meninscus repair    PACEMAKER INSERTION      had 8 seconds heart stop    TOE AMPUTATION Left 10/16/2024    PARTIAL HALLUX AMPUTATION LEFT FOOT performed by Jimbo Bruno DPM at [facility] OR    TOE AMPUTATION Left 10/30/2024    LEFT GREAT TOE AMPUTATION AT METATARSAL JOINT performed by Jimbo Bruno DPM at [facility] OR    TOE AMPUTATION Left 7/29/2025    AMPUTATION LEFT THIRD AND SECOND TOE performed by Damion  Jimbo GONZÁLES DPM at [facility] OR    TONSILLECTOMY      UPPER GASTROINTESTINAL ENDOSCOPY N/A 3/1/2024    ESOPHAGOGASTRODUODENOSCOPY DILATATION performed by Ambrocio Coffey MD at [facility] ENDOSCOPY     Current Medications:    Current Facility-Administered Medications: [START ON 8/2/2025] enoxaparin Sodium (LOVENOX) injection 30 mg, 30 mg, SubCUTAneous, BID  ertapenem (INVanz) 1,000 mg in sodium chloride 0.9 % 50 mL IVPB (addEASE), 1,000 mg, IntraVENous, Once  insulin glargine (LANTUS) injection vial 30 Units, 30 Units, SubCUTAneous, Daily  0.9 % sodium chloride infusion, , IntraVENous, PRN  lidocaine PF 1 % injection 50 mg, 50 mg, IntraDERmal, Once  metroNIDAZOLE (FLAGYL) 500 mg in 0.9% NaCl 100 mL IVPB premix, 500 mg, IntraVENous, Q8H  vancomycin (VANCOCIN) 1,500 mg in sodium chloride 0.9 % 250 mL (addEASE) IVPB, 1,500 mg, IntraVENous, Q12H  sodium chloride flush 0.9 % injection 5-40 mL, 5-40 mL, IntraVENous, 2 times per day  sodium chloride flush 0.9 % injection 5-40 mL, 5-40 mL, IntraVENous, PRN  potassium chloride (KLOR-CON M) extended release tablet 40 mEq, 40 mEq, Oral, PRN **OR** potassium bicarb-citric acid (EFFER-K) effervescent tablet 40 mEq, 40 mEq, Oral, PRN **OR** potassium chloride 10 mEq/100 mL IVPB (Peripheral Line), 10 mEq, IntraVENous, PRN  magnesium sulfate 2000 mg in 50 mL IVPB premix, 2,000 mg, IntraVENous, PRN  ondansetron (ZOFRAN-ODT) disintegrating tablet 4 mg, 4 mg, Oral, Q8H PRN **OR** ondansetron (ZOFRAN) injection 4 mg, 4 mg, IntraVENous, Q6H PRN  polyethylene glycol (GLYCOLAX) packet 17 g, 17 g, Oral, Daily PRN  acetaminophen (TYLENOL) tablet 650 mg, 650 mg, Oral, Q6H PRN **OR** acetaminophen (TYLENOL) suppository 650 mg, 650 mg, Rectal, Q6H PRN  ceFEPIme (MAXIPIME) 2,000 mg in sodium chloride 0.9 % 100 mL IVPB (addEASE), 2,000 mg, IntraVENous, Q8H  oxyCODONE-acetaminophen (PERCOCET) 5-325 MG per tablet 1 tablet, 1 tablet, Oral, Q4H PRN  insulin lispro (HUMALOG,ADMELOG) injection vial 0-8 Units, 0-8 Units,

## 2025-08-01 NOTE — PLAN OF CARE
Problem: Chronic Conditions and Co-morbidities  Goal: Patient's chronic conditions and co-morbidity symptoms are monitored and maintained or improved  8/1/2025 0345 by Siva Rodriguez RN  Outcome: Progressing  Flowsheets (Taken 7/31/2025 1950)  Care Plan - Patient's Chronic Conditions and Co-Morbidity Symptoms are Monitored and Maintained or Improved:   Monitor and assess patient's chronic conditions and comorbid symptoms for stability, deterioration, or improvement   Collaborate with multidisciplinary team to address chronic and comorbid conditions and prevent exacerbation or deterioration   Update acute care plan with appropriate goals if chronic or comorbid symptoms are exacerbated and prevent overall improvement and discharge  7/31/2025 1437 by Alba Junior RN  Outcome: Progressing     Problem: Discharge Planning  Goal: Discharge to home or other facility with appropriate resources  8/1/2025 0345 by Siva Rodriguez RN  Outcome: Progressing  Flowsheets (Taken 7/31/2025 1950)  Discharge to home or other facility with appropriate resources:   Identify barriers to discharge with patient and caregiver   Arrange for needed discharge resources and transportation as appropriate   Identify discharge learning needs (meds, wound care, etc)  7/31/2025 1437 by Alba Junior RN  Outcome: Progressing     Problem: Safety - Adult  Goal: Free from fall injury  8/1/2025 0345 by Siva Rodriguez RN  Outcome: Progressing  7/31/2025 1437 by Alba Junior RN  Outcome: Progressing     Problem: Skin/Tissue Integrity - Adult  Goal: Incisions, wounds, or drain sites healing without S/S of infection  8/1/2025 0345 by Siva Rodriguez RN  Outcome: Progressing  Flowsheets  Taken 8/1/2025 0344  Incisions, Wounds, or Drain Sites Healing Without Sign and Symptoms of Infection: TWICE DAILY: Assess and document dressing/incision, wound bed, drain sites and surrounding tissue  Taken 7/31/2025 1950  Incisions, Wounds, or Drain Sites  Healing Without Sign and Symptoms of Infection: TWICE DAILY: Assess and document dressing/incision, wound bed, drain sites and surrounding tissue  7/31/2025 1437 by Alba Junior RN  Outcome: Progressing     Problem: Metabolic/Fluid and Electrolytes - Adult  Goal: Glucose maintained within prescribed range  8/1/2025 0345 by Siva Rodriguez RN  Outcome: Progressing  Flowsheets (Taken 7/31/2025 1950)  Glucose maintained within prescribed range:   Monitor blood glucose as ordered   Assess for signs and symptoms of hyperglycemia and hypoglycemia   Administer ordered medications to maintain glucose within target range   Assess barriers to adequate nutritional intake and initiate nutrition consult as needed   Instruct patient on self management of diabetes and initiate consult as needed  7/31/2025 1437 by Alba Junior RN  Outcome: Progressing     Problem: ABCDS Injury Assessment  Goal: Absence of physical injury  8/1/2025 0345 by Siva Rodriguez RN  Outcome: Progressing  7/31/2025 1437 by Alba Junior RN  Outcome: Progressing     Problem: Pain  Goal: Verbalizes/displays adequate comfort level or baseline comfort level  8/1/2025 0345 by Siva Rodriguez RN  Outcome: Progressing  Flowsheets (Taken 7/31/2025 1950)  Verbalizes/displays adequate comfort level or baseline comfort level:   Encourage patient to monitor pain and request assistance   Assess pain using appropriate pain scale   Administer analgesics based on type and severity of pain and evaluate response   Implement non-pharmacological measures as appropriate and evaluate response   Consider cultural and social influences on pain and pain management  7/31/2025 1437 by Alba Junior RN  Outcome: Progressing

## 2025-08-01 NOTE — DISCHARGE SUMMARY
V2.0  Discharge Summary    Name:  Tony Boyle /Age/Sex: 1955 (69 y.o. male)   Admit Date: 2025  Discharge Date: 25    MRN & CSN:  [id number] & [id number] Encounter Date and Time 25 12:31 PM EDT    Attending:  Osman Lo MD Discharging Provider: Osman Lo MD     Discharge Diagnosis:     # Osteomyelitis of foot  # Left foot cellulitis  # Diabetes mellitus  # Hypertension  # Peripheral neuropathy    Consultants:  IP CONSULT TO PHARMACY  IP CONSULT TO INFECTIOUS DISEASES  IP CONSULT TO VASCULAR ACCESS TEAM  IP CONSULT TO HOME CARE NEEDS    Brief HPI:    Per admitting H and P...\" Tony Boyle is a 69 y.o. male with a past medical history significant for diabetes, peripheral neuropathy, hypertension, has had amputation of his left great toe, that has healed well, that was undertaken late last year, several weeks ago it seems patient developed an infection of his third toe, the distal aspect but slowly got worse, seen by Dr. Bruno today in the office and he was concerned about osteomyelitis with deep wound probing to the bone, he was sent to the hospital for direct admission, the patient did not look toxic, no labs available, denies any fevers or chills,   \"      Brief hospital course:     Please refer to the admitting H and P for details surrounding the initial presentation.     Presented with evidence osteomyelitis of the third toe, was seen by podiatry, had amputation of 2nd and 3rd toe, bone biopsy results revealed clear margin, culture growing polymicrobial julio as noted below, seen by infectious disease, they are recommending outpatient antibiotic therapy, DAWSON has been done by infectious disease, PICC line has been placed, patient otherwise has worked with physical therapy and Occupational Therapy, ambulating without any problems, has been cleared by podiatry for going home, cellulitis of the foot is much better and for the most part resolved, plan is for him to be discharged

## 2025-08-01 NOTE — CARE COORDINATION
CASE MANAGEMENT DISCHARGE SUMMARY:    DISCHARGE DATE: 8/1/25    DISCHARGED TO HOME     Discharging to Facility/ Agency   Name:  [facility]    Address: [address] [place] [address]  Phone: [phone]  Fax: [phone]      [facility]  [address] [place] [address]  Phone: [phone]  Fax: [phone]     TRANSPORTATION: family Paco Euceda LMSW, [facility] Social Work Case Management   Phone: [phone]  Fax: [phone]   Electronically signed by DEQUAN Le on 8/1/2025 at 12:34 PM

## 2025-08-01 NOTE — PROGRESS NOTES
Arrived to place PICC after chart review. Pre-procedure and timeout done with TELLO Paniagua. Discussed limitations of placement and allergies. Consent confirmed. Procedure explained to pt, including risks and benefits. All questions answered. Pt verbalized understanding.      Vessels easily collapsible upon assessment. No difficulty accessing Basilic vein. Blood free flowing and non-pulsatile. Guidewire, introducer, and catheter all easily inserted. PICC placement verified using 3CG technology as evidenced by peaked P-waves with no initial deflection. Line has brisk blood return and flushes easily.     OK to use PICC. Please use new IV tubing when connecting to the newly placed central line.      Patient tolerated sterile procedure well. Bed left in low position with belongings and call light within reach. Educated on line care. Handoff to bedside RN.      Please call with any questions or concerns. The  will direct you to the PICC RN that is on call.      [phone]

## 2025-08-01 NOTE — TELEPHONE ENCOUNTER
Spoke with Gregor at [facility] and gave verbal OPAT orders:      IV Ertapenem x 1 gm q 24 hr x stop date x  8/20   CBC with diff, CMP, ESR, CRP weekly

## 2025-08-01 NOTE — PROGRESS NOTES
Clinical Pharmacy Note  Subcutaneous Anticoagulant Adjustment     Enoxaparin has been adjusted to enoxaparin 30 mg BID based on [facility] policy.    Recent Labs     07/30/25  0436 08/01/25  0529   CREATININE 0.6* 0.6*     Recent Labs     07/30/25  0436   HGB 12.4*   HCT 37.4*        Estimated Creatinine Clearance: 147 mL/min (A) (based on SCr of 0.6 mg/dL (L)).    Pharmacist Review of Appropriate Use and Automatic Dose Adjustment of Subcutaneous Anticoagulants (Adult)    The guidance below is to provide initial recommendations for dosing. If recommended dose does not align well with patient's current clinical picture, communications with the care team will occur to determine most appropriate medication and dose.    TABLE 1.  ENOXAPARIN ROUTINE PROPHYLAXIS DOSING (Medically ill, routine surgery)   Patient Weight (kg)     50.9 and below 51 - 100.9 101 - 150.9 151 - 174.9 175 or greater         Estimated CrCl  (ml/min) 30 or greater   30 mg SUBQ daily   40 mg SUBQ daily 30 mg SUBQ BID  40 mg SUBQ BID 60mg SUBQ BID      15-29 UFH 5000 units SUBQ BID   30 mg SUBQ daily 30 mg SUBQ daily 40 mg SUBQ daily   60 mg SUBQ daily      Less than 15 or Dialysis UFH 5000 units SUBQ BID   UFH 5000 units SUBQ TID UFH 7500 units SUBQ TID       TABLE 2.  ENOXAPARIN TREATMENT DOSING   (Based on 1mg/kg BID for DVT/PE/AFib)   Patient Weight (kg)     50.9 and below .9 151-189.9 190 or greater         Estimated CrCl  (ml/min) 30 or greater Recommend BS standardized UFH infusion, apixaban or rivaroxaban 1mg/kg SUBQ BID 1mg/kg SUBQ BID if anti-Xa levels are feasible per institution.  Alternatively,  recommend switch to BS standardized UFH infusion     Recommend switch to BS standardized UFH infusion.      15-29 Recommend [facility] standardized UFH infusion or apixaban 1mg/kg SUBQ daily Recommend switch to [facility] standardized UFH infusion     Less than 15 or Dialysis Recommend switch to [facility] standardized UFH infusion.     Francie Holbrook,

## 2025-08-01 NOTE — PLAN OF CARE
Problem: Chronic Conditions and Co-morbidities  Goal: Patient's chronic conditions and co-morbidity symptoms are monitored and maintained or improved  8/1/2025 1048 by Alba Junior RN  Outcome: Progressing  8/1/2025 0345 by Siva Rodriguez RN  Outcome: Progressing  Flowsheets (Taken 7/31/2025 1950)  Care Plan - Patient's Chronic Conditions and Co-Morbidity Symptoms are Monitored and Maintained or Improved:   Monitor and assess patient's chronic conditions and comorbid symptoms for stability, deterioration, or improvement   Collaborate with multidisciplinary team to address chronic and comorbid conditions and prevent exacerbation or deterioration   Update acute care plan with appropriate goals if chronic or comorbid symptoms are exacerbated and prevent overall improvement and discharge     Problem: Discharge Planning  Goal: Discharge to home or other facility with appropriate resources  8/1/2025 1048 by Alba Junior RN  Outcome: Progressing  8/1/2025 0345 by Siva Rodriguez RN  Outcome: Progressing  Flowsheets (Taken 7/31/2025 1950)  Discharge to home or other facility with appropriate resources:   Identify barriers to discharge with patient and caregiver   Arrange for needed discharge resources and transportation as appropriate   Identify discharge learning needs (meds, wound care, etc)     Problem: Safety - Adult  Goal: Free from fall injury  8/1/2025 1048 by Alba Junior RN  Outcome: Progressing  8/1/2025 0345 by Siva Rodriguez RN  Outcome: Progressing     Problem: Skin/Tissue Integrity - Adult  Goal: Incisions, wounds, or drain sites healing without S/S of infection  8/1/2025 1048 by Alba Junior RN  Outcome: Progressing  8/1/2025 0345 by Siva Rodriguez RN  Outcome: Progressing  Flowsheets  Taken 8/1/2025 0344  Incisions, Wounds, or Drain Sites Healing Without Sign and Symptoms of Infection: TWICE DAILY: Assess and document dressing/incision, wound bed, drain sites and surrounding tissue  Taken

## 2025-08-03 LAB
BACTERIA BLD CULT ORG #2: NORMAL
BACTERIA BLD CULT: NORMAL

## 2025-08-04 ENCOUNTER — TELEPHONE (OUTPATIENT)
Dept: INFECTIOUS DISEASES | Age: 70
End: 2025-08-04

## 2025-08-04 ENCOUNTER — HOSPITAL ENCOUNTER (OUTPATIENT)
Age: 70
Setting detail: SPECIMEN
Discharge: HOME | End: 2025-08-04
Payer: MEDICARE

## 2025-08-04 DIAGNOSIS — M86.9 TOE OSTEOMYELITIS, LEFT (HCC): Primary | ICD-10-CM

## 2025-08-04 DIAGNOSIS — E11.628 DIABETIC FOOT INFECTION (HCC): ICD-10-CM

## 2025-08-04 DIAGNOSIS — L08.9 DIABETIC FOOT INFECTION (HCC): ICD-10-CM

## 2025-08-04 LAB
ALBUMIN SERPL-MCNC: 3.5 G/DL (ref 3.4–5)
ALBUMIN/GLOB SERPL: 1.2 {RATIO} (ref 1.1–2.2)
ALP SERPL-CCNC: 90 U/L (ref 40–129)
ALT SERPL-CCNC: 24 U/L (ref 10–40)
ANION GAP SERPL CALCULATED.3IONS-SCNC: 10 MMOL/L (ref 3–16)
AST SERPL-CCNC: 33 U/L (ref 15–37)
BASOPHILS # BLD: 0 K/UL (ref 0–0.2)
BASOPHILS NFR BLD: 0.6 %
BILIRUB SERPL-MCNC: <0.2 MG/DL (ref 0–1)
BUN SERPL-MCNC: 17 MG/DL (ref 7–20)
CALCIUM SERPL-MCNC: 9.1 MG/DL (ref 8.3–10.6)
CHLORIDE SERPL-SCNC: 102 MMOL/L (ref 99–110)
CO2 SERPL-SCNC: 28 MMOL/L (ref 21–32)
CREAT SERPL-MCNC: 0.6 MG/DL (ref 0.8–1.3)
CRP SERPL-MCNC: 16.2 MG/L (ref 0–5.1)
DEPRECATED RDW RBC AUTO: 16.6 % (ref 12.4–15.4)
EOSINOPHIL # BLD: 0.1 K/UL (ref 0–0.6)
EOSINOPHIL NFR BLD: 1.6 %
ERYTHROCYTE [SEDIMENTATION RATE] IN BLOOD BY WESTERGREN METHOD: 61 MM/HR (ref 0–20)
GFR SERPLBLD CREATININE-BSD FMLA CKD-EPI: >90 ML/MIN/{1.73_M2}
GLUCOSE SERPL-MCNC: 282 MG/DL (ref 70–99)
HCT VFR BLD AUTO: 36.8 % (ref 40.5–52.5)
HGB BLD-MCNC: 12.1 G/DL (ref 13.5–17.5)
LYMPHOCYTES # BLD: 2.6 K/UL (ref 1–5.1)
LYMPHOCYTES NFR BLD: 36.2 %
MCH RBC QN AUTO: 28.4 PG (ref 26–34)
MCHC RBC AUTO-ENTMCNC: 33 G/DL (ref 31–36)
MCV RBC AUTO: 86.1 FL (ref 80–100)
MONOCYTES # BLD: 0.7 K/UL (ref 0–1.3)
MONOCYTES NFR BLD: 10.1 %
NEUTROPHILS # BLD: 3.7 K/UL (ref 1.7–7.7)
NEUTROPHILS NFR BLD: 51.5 %
PLATELET # BLD AUTO: 300 K/UL (ref 135–450)
PMV BLD AUTO: 7.6 FL (ref 5–10.5)
POTASSIUM SERPL-SCNC: 4.4 MMOL/L (ref 3.5–5.1)
PROT SERPL-MCNC: 6.5 G/DL (ref 6.4–8.2)
RBC # BLD AUTO: 4.27 M/UL (ref 4.2–5.9)
SODIUM SERPL-SCNC: 140 MMOL/L (ref 136–145)
WBC # BLD AUTO: 7.2 K/UL (ref 4–11)

## 2025-08-04 PROCEDURE — 36415 COLL VENOUS BLD VENIPUNCTURE: CPT

## 2025-08-04 PROCEDURE — 80053 COMPREHEN METABOLIC PANEL: CPT

## 2025-08-04 PROCEDURE — 86140 C-REACTIVE PROTEIN: CPT

## 2025-08-04 PROCEDURE — 85025 COMPLETE CBC W/AUTO DIFF WBC: CPT

## 2025-08-04 PROCEDURE — 85652 RBC SED RATE AUTOMATED: CPT

## 2025-08-04 RX ORDER — LINEZOLID 600 MG/1
600 TABLET, FILM COATED ORAL 2 TIMES DAILY
Qty: 32 TABLET | Refills: 0
Start: 2025-08-04 | End: 2025-08-20

## 2025-08-11 ENCOUNTER — TELEPHONE (OUTPATIENT)
Dept: INFECTIOUS DISEASES | Age: 70
End: 2025-08-11

## 2025-08-11 ENCOUNTER — HOSPITAL ENCOUNTER (OUTPATIENT)
Age: 70
Setting detail: SPECIMEN
Discharge: HOME | End: 2025-08-11
Payer: MEDICARE

## 2025-08-11 LAB
ALBUMIN SERPL-MCNC: 3.5 G/DL (ref 3.4–5)
ALBUMIN/GLOB SERPL: 1.2 {RATIO} (ref 1.1–2.2)
ALP SERPL-CCNC: 99 U/L (ref 40–129)
ALT SERPL-CCNC: 20 U/L (ref 10–40)
ANION GAP SERPL CALCULATED.3IONS-SCNC: 11 MMOL/L (ref 3–16)
AST SERPL-CCNC: 23 U/L (ref 15–37)
BASOPHILS # BLD: 0 K/UL (ref 0–0.2)
BASOPHILS NFR BLD: 0.5 %
BILIRUB SERPL-MCNC: <0.2 MG/DL (ref 0–1)
BUN SERPL-MCNC: 18 MG/DL (ref 7–20)
CALCIUM SERPL-MCNC: 8.7 MG/DL (ref 8.3–10.6)
CHLORIDE SERPL-SCNC: 103 MMOL/L (ref 99–110)
CO2 SERPL-SCNC: 27 MMOL/L (ref 21–32)
CREAT SERPL-MCNC: 0.7 MG/DL (ref 0.8–1.3)
CRP SERPL-MCNC: 3.2 MG/L (ref 0–5.1)
DEPRECATED RDW RBC AUTO: 17.1 % (ref 12.4–15.4)
EOSINOPHIL # BLD: 0.1 K/UL (ref 0–0.6)
EOSINOPHIL NFR BLD: 1.4 %
ERYTHROCYTE [SEDIMENTATION RATE] IN BLOOD BY WESTERGREN METHOD: 33 MM/HR (ref 0–20)
GFR SERPLBLD CREATININE-BSD FMLA CKD-EPI: >90 ML/MIN/{1.73_M2}
GLUCOSE SERPL-MCNC: 172 MG/DL (ref 70–99)
HCT VFR BLD AUTO: 39.4 % (ref 40.5–52.5)
HGB BLD-MCNC: 12.9 G/DL (ref 13.5–17.5)
LYMPHOCYTES # BLD: 2.5 K/UL (ref 1–5.1)
LYMPHOCYTES NFR BLD: 33.6 %
MCH RBC QN AUTO: 28.3 PG (ref 26–34)
MCHC RBC AUTO-ENTMCNC: 32.7 G/DL (ref 31–36)
MCV RBC AUTO: 86.5 FL (ref 80–100)
MONOCYTES # BLD: 0.5 K/UL (ref 0–1.3)
MONOCYTES NFR BLD: 7.3 %
NEUTROPHILS # BLD: 4.2 K/UL (ref 1.7–7.7)
NEUTROPHILS NFR BLD: 57.2 %
PLATELET # BLD AUTO: 256 K/UL (ref 135–450)
PMV BLD AUTO: 8 FL (ref 5–10.5)
POTASSIUM SERPL-SCNC: 4.4 MMOL/L (ref 3.5–5.1)
PROT SERPL-MCNC: 6.5 G/DL (ref 6.4–8.2)
RBC # BLD AUTO: 4.55 M/UL (ref 4.2–5.9)
SODIUM SERPL-SCNC: 141 MMOL/L (ref 136–145)
WBC # BLD AUTO: 7.3 K/UL (ref 4–11)

## 2025-08-11 PROCEDURE — 80053 COMPREHEN METABOLIC PANEL: CPT

## 2025-08-11 PROCEDURE — 85652 RBC SED RATE AUTOMATED: CPT

## 2025-08-11 PROCEDURE — 86140 C-REACTIVE PROTEIN: CPT

## 2025-08-11 PROCEDURE — 85025 COMPLETE CBC W/AUTO DIFF WBC: CPT

## 2025-08-11 PROCEDURE — 36415 COLL VENOUS BLD VENIPUNCTURE: CPT

## 2025-08-18 ENCOUNTER — HOSPITAL ENCOUNTER (OUTPATIENT)
Age: 70
Setting detail: SPECIMEN
Discharge: HOME | End: 2025-08-18
Payer: MEDICARE

## 2025-08-18 LAB
ALBUMIN SERPL-MCNC: 3.9 G/DL (ref 3.4–5)
ALBUMIN/GLOB SERPL: 1.3 {RATIO} (ref 1.1–2.2)
ALP SERPL-CCNC: 101 U/L (ref 40–129)
ALT SERPL-CCNC: 16 U/L (ref 10–40)
ANION GAP SERPL CALCULATED.3IONS-SCNC: 11 MMOL/L (ref 3–16)
AST SERPL-CCNC: 21 U/L (ref 15–37)
BASOPHILS # BLD: 0 K/UL (ref 0–0.2)
BASOPHILS NFR BLD: 0.6 %
BILIRUB SERPL-MCNC: 0.4 MG/DL (ref 0–1)
BUN SERPL-MCNC: 24 MG/DL (ref 7–20)
CALCIUM SERPL-MCNC: 9.4 MG/DL (ref 8.3–10.6)
CHLORIDE SERPL-SCNC: 102 MMOL/L (ref 99–110)
CO2 SERPL-SCNC: 27 MMOL/L (ref 21–32)
CREAT SERPL-MCNC: 0.8 MG/DL (ref 0.8–1.3)
CRP SERPL-MCNC: 11 MG/L (ref 0–5.1)
DEPRECATED RDW RBC AUTO: 17.2 % (ref 12.4–15.4)
EOSINOPHIL # BLD: 0.1 K/UL (ref 0–0.6)
EOSINOPHIL NFR BLD: 1.5 %
ERYTHROCYTE [SEDIMENTATION RATE] IN BLOOD BY WESTERGREN METHOD: 36 MM/HR (ref 0–20)
GFR SERPLBLD CREATININE-BSD FMLA CKD-EPI: >90 ML/MIN/{1.73_M2}
GLUCOSE SERPL-MCNC: 56 MG/DL (ref 70–99)
HCT VFR BLD AUTO: 39.1 % (ref 40.5–52.5)
HGB BLD-MCNC: 12.9 G/DL (ref 13.5–17.5)
LYMPHOCYTES # BLD: 3.2 K/UL (ref 1–5.1)
LYMPHOCYTES NFR BLD: 46.3 %
MCH RBC QN AUTO: 28.6 PG (ref 26–34)
MCHC RBC AUTO-ENTMCNC: 33.1 G/DL (ref 31–36)
MCV RBC AUTO: 86.4 FL (ref 80–100)
MONOCYTES # BLD: 0.8 K/UL (ref 0–1.3)
MONOCYTES NFR BLD: 11.3 %
NEUTROPHILS # BLD: 2.7 K/UL (ref 1.7–7.7)
NEUTROPHILS NFR BLD: 40.3 %
PLATELET # BLD AUTO: 148 K/UL (ref 135–450)
PMV BLD AUTO: 7.6 FL (ref 5–10.5)
POTASSIUM SERPL-SCNC: 4.6 MMOL/L (ref 3.5–5.1)
PROT SERPL-MCNC: 6.9 G/DL (ref 6.4–8.2)
RBC # BLD AUTO: 4.52 M/UL (ref 4.2–5.9)
SODIUM SERPL-SCNC: 140 MMOL/L (ref 136–145)
WBC # BLD AUTO: 6.8 K/UL (ref 4–11)

## 2025-08-18 PROCEDURE — 80053 COMPREHEN METABOLIC PANEL: CPT

## 2025-08-18 PROCEDURE — 86140 C-REACTIVE PROTEIN: CPT

## 2025-08-18 PROCEDURE — 85025 COMPLETE CBC W/AUTO DIFF WBC: CPT

## 2025-08-18 PROCEDURE — 85652 RBC SED RATE AUTOMATED: CPT

## 2025-08-18 PROCEDURE — 36415 COLL VENOUS BLD VENIPUNCTURE: CPT

## 2025-08-19 ENCOUNTER — TELEPHONE (OUTPATIENT)
Dept: INFECTIOUS DISEASES | Age: 70
End: 2025-08-19

## 2025-08-19 RX ORDER — LINEZOLID 600 MG/1
600 TABLET, FILM COATED ORAL 2 TIMES DAILY
Qty: 32 TABLET | Refills: 0 | OUTPATIENT
Start: 2025-08-19 | End: 2025-09-04

## (undated) DEVICE — SOLUTION PREP PAINT POV IOD FOR SKIN MUCOUS MEM

## (undated) DEVICE — BANDAGE,ELASTIC,SOFTWRAP,STERILE,4"X5YD: Brand: MEDLINE

## (undated) DEVICE — DRAPE,REIN 53X77,STERILE: Brand: MEDLINE

## (undated) DEVICE — STOCKINETTE,IMPERVIOUS,12X48,STERILE: Brand: MEDLINE

## (undated) DEVICE — PADDING,UNDERCAST,COTTON, 4"X4YD STERILE: Brand: MEDLINE

## (undated) DEVICE — SOLUTION IV IRRIG WATER 500ML POUR BRL ST 2F7113

## (undated) DEVICE — HANDPIECE SET WITH HIGH FLOW TIP AND SUCTION TUBE: Brand: INTERPULSE

## (undated) DEVICE — GLOVE,SURG,SENSICARE SLT,LF,PF,8: Brand: MEDLINE

## (undated) DEVICE — SOL IRR SOD CHL 0.9% TITAN XL CNTNR 3000ML

## (undated) DEVICE — Device: Brand: MEDEX

## (undated) DEVICE — MERCY HEALTH WEST TURNOVER: Brand: MEDLINE INDUSTRIES, INC.

## (undated) DEVICE — PREMIUM DRY TRAY LF: Brand: MEDLINE INDUSTRIES, INC.

## (undated) DEVICE — WIPE INSTR W73XL73CM VISITEC

## (undated) DEVICE — SURGICAL PROC PACK SHT WEST V4

## (undated) DEVICE — SOLUTION SCRB 4OZ 7.5% POVIDONE IOD ANTIMIC BTL

## (undated) DEVICE — GLOVE,SURG,SENSICARE SLT,LF,PF,7.5: Brand: MEDLINE

## (undated) DEVICE — GOWN,SIRUS,POLYRNF,BRTHSLV,XL,30/CS: Brand: MEDLINE

## (undated) DEVICE — GLOVE,SURG,TRIUMPH MICRO,LTX,PF,7.5: Brand: MEDLINE

## (undated) DEVICE — SOLUTION IRRIG 3000ML 0.9% SOD CHL USP UROMATIC PLAS CONT

## (undated) DEVICE — TRANSFER SET 3": Brand: MEDLINE INDUSTRIES, INC.

## (undated) DEVICE — SOLUTION IRRIG BSS ST 500ML

## (undated) DEVICE — SYRINGE MED 3ML CLR PLAS STD N CTRL LUERLOCK TIP DISP

## (undated) DEVICE — PRECISION THIN (9.0 X 0.38 X 31.0MM)

## (undated) DEVICE — THIN OFFSET (9.0 X 0.38 X 25.0MM)

## (undated) DEVICE — BANDAGE,GAUZE,CONFORMING,2"X75",STRL,LF: Brand: MEDLINE

## (undated) DEVICE — PODIATRY: Brand: MEDLINE INDUSTRIES, INC.

## (undated) DEVICE — SPONGE LAP W18XL18IN WHT COT 4 PLY FLD STRUNG RADPQ DISP ST 2 PER PACK

## (undated) DEVICE — CANNULA SAMP CO2 AD GRN 7FT CO2 AND 7FT O2 TBNG UNIV CONN

## (undated) DEVICE — BANDAGE COMPR W4INXL15FT BGE E SGL LAYERED CLP CLSR

## (undated) DEVICE — SYRINGE TB 1ML NDL 25GA L0.625IN PLAS SLIP TIP CONVENTIONAL

## (undated) DEVICE — MARKER,SKIN,WI/RULER AND LABELS: Brand: MEDLINE

## (undated) DEVICE — ELECTRODE PT RET AD L9FT HI MOIST COND ADH HYDRGEL CORDED

## (undated) DEVICE — SPONGE,LAP,18"X18",STD,XR,ST,5/PK,40PK/C: Brand: MEDLINE

## (undated) DEVICE — Device

## (undated) DEVICE — GLOVE SURG SZ 85 CRM LTX FREE POLYISOPRENE POLYMER BEAD ANTI

## (undated) DEVICE — CANNULA IRRIGATION OPHTHALMIC 19GA SUB TENDON ANESTHESIA

## (undated) DEVICE — BANDAGE GZ W2XL75IN ST RAYON POLY CNFRM STRTCH LTWT

## (undated) DEVICE — SYRINGE MED 30ML STD CLR PLAS LUERLOCK TIP N CTRL DISP

## (undated) DEVICE — FORCEPS BX L240CM WRK CHN 2.8MM STD CAP W/ NDL MIC MESH